# Patient Record
Sex: FEMALE | Race: BLACK OR AFRICAN AMERICAN | NOT HISPANIC OR LATINO | Employment: OTHER | URBAN - METROPOLITAN AREA
[De-identification: names, ages, dates, MRNs, and addresses within clinical notes are randomized per-mention and may not be internally consistent; named-entity substitution may affect disease eponyms.]

---

## 2017-04-26 ENCOUNTER — HOSPITAL ENCOUNTER (EMERGENCY)
Facility: HOSPITAL | Age: 67
Discharge: HOME | End: 2017-04-27
Attending: EMERGENCY MEDICINE
Payer: MEDICARE

## 2017-04-26 DIAGNOSIS — R41.82 ALTERED MENTAL STATUS, UNSPECIFIED ALTERED MENTAL STATUS TYPE: ICD-10-CM

## 2017-04-26 DIAGNOSIS — N39.0 URINARY TRACT INFECTION WITHOUT HEMATURIA, SITE UNSPECIFIED: Primary | ICD-10-CM

## 2017-04-26 LAB
ALBUMIN SERPL BCP-MCNC: 3.6 G/DL
ALP SERPL-CCNC: 36 U/L
ALT SERPL W/O P-5'-P-CCNC: 9 U/L
ANION GAP SERPL CALC-SCNC: 7 MMOL/L
AST SERPL-CCNC: 20 U/L
BACTERIA #/AREA URNS HPF: ABNORMAL /HPF
BASOPHILS # BLD AUTO: 0.01 K/UL
BASOPHILS NFR BLD: 0.3 %
BILIRUB SERPL-MCNC: 0.2 MG/DL
BILIRUB UR QL STRIP: NEGATIVE
BUN SERPL-MCNC: 19 MG/DL
CALCIUM SERPL-MCNC: 9.8 MG/DL
CHLORIDE SERPL-SCNC: 105 MMOL/L
CLARITY UR: CLEAR
CO2 SERPL-SCNC: 25 MMOL/L
COLOR UR: YELLOW
CREAT SERPL-MCNC: 1.2 MG/DL
DIFFERENTIAL METHOD: ABNORMAL
EOSINOPHIL # BLD AUTO: 0 K/UL
EOSINOPHIL NFR BLD: 0.9 %
ERYTHROCYTE [DISTWIDTH] IN BLOOD BY AUTOMATED COUNT: 14.5 %
EST. GFR  (AFRICAN AMERICAN): 55 ML/MIN/1.73 M^2
EST. GFR  (NON AFRICAN AMERICAN): 48 ML/MIN/1.73 M^2
GLUCOSE SERPL-MCNC: 112 MG/DL
GLUCOSE UR QL STRIP: NEGATIVE
HCT VFR BLD AUTO: 30.4 %
HGB BLD-MCNC: 9.7 G/DL
HGB UR QL STRIP: NEGATIVE
HYALINE CASTS #/AREA URNS LPF: 0 /LPF
KETONES UR QL STRIP: NEGATIVE
LEUKOCYTE ESTERASE UR QL STRIP: ABNORMAL
LYMPHOCYTES # BLD AUTO: 0.9 K/UL
LYMPHOCYTES NFR BLD: 26.1 %
MCH RBC QN AUTO: 26.5 PG
MCHC RBC AUTO-ENTMCNC: 31.9 %
MCV RBC AUTO: 83 FL
MICROSCOPIC COMMENT: ABNORMAL
MONOCYTES # BLD AUTO: 0.3 K/UL
MONOCYTES NFR BLD: 8.9 %
NEUTROPHILS # BLD AUTO: 2.2 K/UL
NEUTROPHILS NFR BLD: 63.8 %
NITRITE UR QL STRIP: NEGATIVE
PH UR STRIP: 6 [PH] (ref 5–8)
PLATELET # BLD AUTO: 248 K/UL
PMV BLD AUTO: 10.8 FL
POCT GLUCOSE: 132 MG/DL (ref 70–110)
POTASSIUM SERPL-SCNC: 4.1 MMOL/L
PROT SERPL-MCNC: 7.8 G/DL
PROT UR QL STRIP: ABNORMAL
RBC # BLD AUTO: 3.66 M/UL
RBC #/AREA URNS HPF: 5 /HPF (ref 0–4)
SODIUM SERPL-SCNC: 137 MMOL/L
SP GR UR STRIP: 1.02 (ref 1–1.03)
TROPONIN I SERPL DL<=0.01 NG/ML-MCNC: <0.006 NG/ML
URN SPEC COLLECT METH UR: ABNORMAL
UROBILINOGEN UR STRIP-ACNC: NEGATIVE EU/DL
WBC # BLD AUTO: 3.48 K/UL
WBC #/AREA URNS HPF: 50 /HPF (ref 0–5)

## 2017-04-26 PROCEDURE — 81000 URINALYSIS NONAUTO W/SCOPE: CPT

## 2017-04-26 PROCEDURE — 80053 COMPREHEN METABOLIC PANEL: CPT

## 2017-04-26 PROCEDURE — 85025 COMPLETE CBC W/AUTO DIFF WBC: CPT

## 2017-04-26 PROCEDURE — 84484 ASSAY OF TROPONIN QUANT: CPT

## 2017-04-26 PROCEDURE — 99284 EMERGENCY DEPT VISIT MOD MDM: CPT | Mod: 25

## 2017-04-26 PROCEDURE — 82962 GLUCOSE BLOOD TEST: CPT

## 2017-04-26 RX ORDER — CEPHALEXIN 500 MG/1
500 CAPSULE ORAL EVERY 8 HOURS
Qty: 15 CAPSULE | Refills: 0 | Status: SHIPPED | OUTPATIENT
Start: 2017-04-26 | End: 2017-05-01

## 2017-04-26 RX ORDER — TRAMADOL HYDROCHLORIDE 50 MG/1
50 TABLET ORAL EVERY 6 HOURS PRN
Status: ON HOLD | COMMUNITY
End: 2020-08-10 | Stop reason: SDUPTHER

## 2017-04-26 RX ORDER — CEPHALEXIN 250 MG/1
500 CAPSULE ORAL
Status: COMPLETED | OUTPATIENT
Start: 2017-04-27 | End: 2017-04-27

## 2017-04-26 NOTE — ED AVS SNAPSHOT
[facility]  [address] [place] [address]               Lucina Villalpando   2017  7:30 PM   ED    Description:  Female : 1950   Department:  [facility]           Your Care was Coordinated By:     Provider Role From To    Santiago Cardenas III, MD Attending Provider [phone] --      Reason for Visit     Altered Mental Status           Diagnoses this Visit        Comments    Urinary tract infection without hematuria, site unspecified    -  Primary     Altered mental status, unspecified altered mental status type           ED Disposition     ED Disposition Condition Comment    Discharge             To Do List           Follow-up Information     Follow up with Johan Harrison MD In 1 week.    Specialty:  Internal Medicine    Contact information:    [facility] [address] [phone]          Follow up with Your Neurologist.    Why:  If your symptoms continue       These Medications        Disp Refills Start End    cephALEXin (KEFLEX) 500 MG capsule 15 capsule 0 2017    Take 1 capsule (500 mg total) by mouth every 8 (eight) hours. - Oral    Pharmacy: [facility] [address] [place] [address] [place] AT [facility] Ph #: [phone]         [facility]     [facility] Care [facility] - 24 Assistance  Unless otherwise directed by your provider, please contact [facility], our nurse care line that is available for 24/ assistance.     Registered nurses in the [facility] provide: appointment scheduling, clinical advisement, health education, and other advisory services.  Call: [phone] (toll free)               Medications           Message regarding Medications     Verify the changes and/or additions to your medication regime listed below are the same as discussed with your clinician today.  If any of these changes or additions are  incorrect, please notify your healthcare provider.        START taking these NEW medications        Refills    cephALEXin (KEFLEX) 500 MG capsule 0    Sig: Take 1 capsule (500 mg total) by mouth every 8 (eight) hours.    Class: Print    Route: Oral      These medications were administered today        Dose Freq    cephALEXin capsule 500 mg 500 mg ED 1 Time    Starting on: 4/27/2017    Sig: Take 2 capsules (500 mg total) by mouth ED 1 Time.    Class: Normal    Route: Oral           Verify that the below list of medications is an accurate representation of the medications you are currently taking.  If none reported, the list may be blank. If incorrect, please contact your healthcare provider. Carry this list with you in case of emergency.           Current Medications     amitriptyline (ELAVIL) 50 MG tablet Take 50 mg by mouth every evening.    metformin (GLUCOPHAGE) 1000 MG tablet Take 1,000 mg by mouth 2 (two) times daily with meals.    naproxen (NAPROSYN) 500 MG tablet Take 500 mg by mouth 2 (two) times daily.    tramadol (ULTRAM) 50 mg tablet Take 50 mg by mouth every 6 (six) hours as needed for Pain.    cephALEXin (KEFLEX) 500 MG capsule Take 1 capsule (500 mg total) by mouth every 8 (eight) hours.    cephALEXin capsule 500 mg Starting on Apr 27, 2017. Take 2 capsules (500 mg total) by mouth ED 1 Time.    cyanocobalamin (VITAMIN B-12) 1000 MCG tablet Take 100 mcg by mouth once daily.    ondansetron (ZOFRAN-ODT) 8 MG TbDL Take 1 tablet (8 mg total) by mouth every 8 (eight) hours as needed (Nausea).    oxycodone (ROXICODONE) 5 MG immediate release tablet Take 1 tablet (5 mg total) by mouth every 6 (six) hours as needed for Pain.    potassium & sodium phosphates (PHOS-NAK) 280-160-250 mg PwPk Take 1 packet by mouth 4 (four) times daily with meals and nightly.    senna-docusate 8.6-50 mg (PERICOLACE) 8.6-50 mg per tablet Take 1 tablet by mouth 2 (two) times daily as needed for Constipation.           Clinical  "Reference Information           Your Vitals Were     BP Pulse Temp Resp Height Weight    176/83 (BP Location: Right arm, Patient Position: Sitting) 77 97.9 °F (36.6 °C) (Oral) 18 5' 9" (1.753 m) 51.7 kg (114 lb)    SpO2 BMI             96% 16.83 kg/m2         Allergies as of 4/26/2017     No Known Allergies      Immunizations Administered on Date of Encounter - 4/26/2017     None      ED Micro, Lab, POCT     Start Ordered       Status Ordering Provider    04/26/17 2030 04/26/17 2030  POCT glucose  Once      Final result     04/26/17 1937 04/26/17 1936  CBC auto differential  STAT      Final result     04/26/17 1937 04/26/17 1936  Comprehensive metabolic panel  STAT      Final result     04/26/17 1937 04/26/17 1936  Troponin I  STAT      Final result     04/26/17 1937 04/26/17 1936  Urinalysis  STAT      Final result     04/26/17 1937 04/26/17 1936  POCT glucose  Once      Acknowledged     04/26/17 1936 04/26/17 1936  Urinalysis Microscopic  Once      Final result       ED Imaging Orders     Start Ordered       Status Ordering Provider    04/26/17 1937 04/26/17 1936  X-Ray Chest 1 View  1 time imaging      Final result     04/26/17 1935 04/26/17 1934  CT Head Without Contrast  1 time imaging      Final result         Discharge Instructions       Return to the emergency department if you develop worsening headache, persistent vomiting, fever higher than 100.4°, vomiting, or for any worsening medical concerns.    [facility] Sign-Up     Activating your [facility] account is as easy as 1-2-3!     1) Visit [email or web address], select Sign Up Now, enter this activation code and your date of birth, then select Next.  [email or web address]  Expires: 6/10/2017 11:55 PM      2) Create a username and password to use when you visit [facility] in the future and select a security question in case you lose your password and select Next.    3) Enter your e-mail address and click Sign Up!    Additional Information  If you have questions, please " e-mail [email or web address] or call [phone] to talk to our [employer] staff. Remember, [facility] is NOT to be used for urgent needs. For medical emergencies, dial 911.         Smoking Cessation     If you would like to quit smoking:   You may be eligible for free services if you are a [place] resident and started smoking cigarettes before September 1, 1988.  Call the Smoking Cessation Trust ([facility]) toll free at (562) 742-2215 or ([phone] 073-8122.   Call 1-800-QUIT-NOW if you do not meet the above criteria.   Contact us via email: [email or web address]   View our website for more information: [email or web address]         [facility] complies with applicable Federal civil rights laws and does not discriminate on the basis of race, color, national origin, age, disability, or sex.        Language Assistance Services     ATTENTION: Language assistance services are available, free of charge. Please call [phone].      ATENCIÓN: Si habla español, tiene a sun disposición servicios gratuitos de asistencia lingüística. Llame al [phone].     CHÚ Ý: N?u b?n nói Ti?ng Vi?t, có các d?ch v? h? tr? ngôn ng? mi?n phí dành cho b?n. G?i s? [phone].

## 2017-04-27 VITALS
HEIGHT: 69 IN | RESPIRATION RATE: 18 BRPM | DIASTOLIC BLOOD PRESSURE: 74 MMHG | WEIGHT: 114 LBS | SYSTOLIC BLOOD PRESSURE: 138 MMHG | OXYGEN SATURATION: 100 % | HEART RATE: 68 BPM | TEMPERATURE: 98 F | BODY MASS INDEX: 16.88 KG/M2

## 2017-04-27 PROCEDURE — 25000003 PHARM REV CODE 250: Performed by: EMERGENCY MEDICINE

## 2017-04-27 PROCEDURE — 93005 ELECTROCARDIOGRAM TRACING: CPT

## 2017-04-27 RX ADMIN — CEPHALEXIN 500 MG: 250 CAPSULE ORAL at 12:04

## 2017-04-27 NOTE — ED PROVIDER NOTES
Encounter Date: 4/26/2017    SCRIBE #1 NOTE: I, Nitin Lopez, am scribing for, and in the presence of,  Santiago Cardenas MD. I have scribed the following portions of the note - Other sections scribed: ROS, HPI.       History     Chief Complaint   Patient presents with    Altered Mental Status     family reports confusion since yesterday and headache not relieved with her normal meds, fall two days ago and hit her head on the wall     Review of patient's allergies indicates:  No Known Allergies  HPI Comments: CC: AMS    HPI: This 66 y.o. F, who has a past medical history of Arthritis; Depression; Diabetes mellitus; Fibromyalgia; Hypertension; and Stroke, presents to the ED for evaluation of confusion, frontal headache and left knee soreness since falling forward off the toilet 2 nights ago and hitting her head on a wall. Episodes of confusion started this afternoon.  Patient's caregiver reports that she does seem to be staring more than normal.  Patient describes her confusion has been unable to complete thoughts.  She also describes that earlier today she was attempting to pay a bill over the phone but could not finish doing so.  No prior episodes of similar confusion. She denies dizziness, facial asymmetry, speech difficulty, numbness, nausea, vomiting, chest pain, shortness of breath, weakness, fatigue, back pain, neck pain, dysuria and fever. She notes she has chronic diarrhea; approximately 3 episodes a day.     The history is provided by the patient and a caregiver.     Past Medical History:   Diagnosis Date    Arthritis     Depression     Diabetes mellitus     Fibromyalgia     Hypertension     Stroke      Past Surgical History:   Procedure Laterality Date    BACK SURGERY      2 x for ruptured disc    KNEE SURGERY      right knee-meniscus     Family History   Problem Relation Age of Onset    Diabetes Mother     Diabetes Sister      Social History   Substance Use Topics    Smoking status: Current  Every Day Smoker     Packs/day: 1.00     Years: 46.00     Types: Cigarettes    Smokeless tobacco: Never Used    Alcohol use Yes      Comment: socially     Review of Systems   Constitutional: Negative for fever.   HENT: Negative for sore throat.    Eyes: Negative for visual disturbance.   Respiratory: Negative for shortness of breath.    Cardiovascular: Negative for chest pain.   Gastrointestinal: Positive for diarrhea (chronic). Negative for abdominal pain, nausea and vomiting.   Genitourinary: Negative for dysuria.   Musculoskeletal: Negative for back pain.        (+) Left knee soreness   Skin: Negative for rash.   Neurological: Positive for headaches (frontal). Negative for facial asymmetry, weakness and numbness.   Psychiatric/Behavioral: Positive for confusion.       Physical Exam   Initial Vitals   BP Pulse Resp Temp SpO2   04/26/17 1855 04/26/17 1855 04/26/17 1855 04/26/17 1855 04/26/17 1855   176/83 77 18 97.9 °F (36.6 °C) 96 %     Physical Exam    Nursing note and vitals reviewed.  Constitutional: She appears well-developed and well-nourished. She is not diaphoretic. No distress.   HENT:   Head: Normocephalic and atraumatic.   Nose: Nose normal.   Mouth/Throat: Oropharynx is clear and moist. No oropharyngeal exudate.   Eyes: Conjunctivae and EOM are normal. Pupils are equal, round, and reactive to light. No scleral icterus.   Neck: Normal range of motion. Neck supple. No thyromegaly present. No tracheal deviation present.   Cardiovascular: Normal rate, regular rhythm and normal heart sounds. Exam reveals no gallop and no friction rub.    No murmur heard.  Pulmonary/Chest: Breath sounds normal. No respiratory distress. She has no wheezes. She has no rhonchi. She has no rales.   Abdominal: Soft. Bowel sounds are normal. She exhibits no distension and no mass. There is no tenderness. There is no rebound and no guarding.   Musculoskeletal: Normal range of motion. She exhibits no edema or tenderness.    Lymphadenopathy:     She has no cervical adenopathy.   Neurological: She is alert and oriented to person, place, and time. She has normal strength. No cranial nerve deficit or sensory deficit.   Skin: Skin is warm and dry. No rash noted. No erythema. No pallor.   Psychiatric: She has a normal mood and affect. Her behavior is normal. Thought content normal.         ED Course   Procedures  Labs Reviewed   CBC W/ AUTO DIFFERENTIAL - Abnormal; Notable for the following:        Result Value    WBC 3.48 (*)     RBC 3.66 (*)     Hemoglobin 9.7 (*)     Hematocrit 30.4 (*)     MCH 26.5 (*)     MCHC 31.9 (*)     Lymph # 0.9 (*)     All other components within normal limits   POCT GLUCOSE - Abnormal; Notable for the following:     POCT Glucose 132 (*)     All other components within normal limits   COMPREHENSIVE METABOLIC PANEL   TROPONIN I   URINALYSIS   POCT GLUCOSE MONITORING CONTINUOUS             Medical Decision Making:   Initial Assessment:   66-year-old female presents complaining of confusion in the setting of a fall 2 days ago and headache.  She does report that in the past she had severe migraines, treated with amitriptyline, but that those headaches seem to have completely resolved until recently.  On exam she is well-appearing, moderately hypertensive, with a normal neurologic exam, normal mental status, answering all questions appropriately.  Differential Diagnosis:   Will screen for intracranial hemorrhage.  Will also screen for causes of altered mental status, including uremia, electrolyte abnormalities, glucose abnormalities, infection, dehydration.  Independently Interpreted Test(s):   I have ordered and independently interpreted X-rays - see summary below.       <> Summary of X-Ray Reading(s): CT head: No acute abnormalities, old infarct noted  I have ordered and independently interpreted EKG Reading(s) - see summary below       <> Summary of EKG Reading(s):  NSR, nl axis, nl intervals, no definite acute  ischemic changes  ED Management:  Patient's workup reveals urinary tract infection and no other significant abnormality.  Her exam is stable, still with a completely normal neurologic exam.  It is likely that her urinary tract infection as the cause of her subjective sense of confusion, which seems more appropriately characterized as concentration difficulty.  No evidence of sepsis or pyelonephritis.  We'll treat with Keflex and recommend follow-up with primary physician.            Scribe Attestation:   Scribe #1: I performed the above scribed service and the documentation accurately describes the services I performed. I attest to the accuracy of the note.    Attending Attestation:           Physician Attestation for Scribe:  Physician Attestation Statement for Scribe #1: I, Santiago Cardenas MD, reviewed documentation, as scribed by Nitin Lopez in my presence, and it is both accurate and complete.                 ED Course     Clinical Impression:   The primary encounter diagnosis was Urinary tract infection without hematuria, site unspecified. A diagnosis of Altered mental status, unspecified altered mental status type was also pertinent to this visit.          Santiago Cardenas III, MD  04/27/17 0209

## 2017-04-27 NOTE — ED TRIAGE NOTES
Pt's daughter reports pt fell a couple days ago and might have hit her head. Pt states that she has had a headache and has taken 2 tramadol. Pt's daughter states that the pt has been confused and unable to hold a conversation for the past day.

## 2017-04-27 NOTE — DISCHARGE INSTRUCTIONS
Return to the emergency department if you develop worsening headache, persistent vomiting, fever higher than 100.4°, vomiting, or for any worsening medical concerns.

## 2017-05-08 ENCOUNTER — HOSPITAL ENCOUNTER (EMERGENCY)
Facility: HOSPITAL | Age: 67
Discharge: HOME | End: 2017-05-08
Attending: EMERGENCY MEDICINE
Payer: MEDICARE

## 2017-05-08 VITALS
WEIGHT: 114 LBS | SYSTOLIC BLOOD PRESSURE: 154 MMHG | HEART RATE: 66 BPM | DIASTOLIC BLOOD PRESSURE: 75 MMHG | RESPIRATION RATE: 16 BRPM | OXYGEN SATURATION: 100 % | BODY MASS INDEX: 16.88 KG/M2 | TEMPERATURE: 98 F | HEIGHT: 69 IN

## 2017-05-08 DIAGNOSIS — N39.0 URINARY TRACT INFECTION WITHOUT HEMATURIA, SITE UNSPECIFIED: Primary | ICD-10-CM
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
## 2017-05-08 LAB
ALBUMIN SERPL BCP-MCNC: 3.4 G/DL
ALP SERPL-CCNC: 38 U/L
ALT SERPL W/O P-5'-P-CCNC: 7 U/L
ANION GAP SERPL CALC-SCNC: 8 MMOL/L
AST SERPL-CCNC: 15 U/L
BACTERIA #/AREA URNS HPF: ABNORMAL /HPF
BASOPHILS # BLD AUTO: 0.01 K/UL
BASOPHILS NFR BLD: 0.2 %
BILIRUB SERPL-MCNC: 0.3 MG/DL
BILIRUB UR QL STRIP: NEGATIVE
BUN SERPL-MCNC: 23 MG/DL
CALCIUM SERPL-MCNC: 9.5 MG/DL
CHLORIDE SERPL-SCNC: 107 MMOL/L
CLARITY UR: ABNORMAL
CO2 SERPL-SCNC: 26 MMOL/L
COLOR UR: YELLOW
CREAT SERPL-MCNC: 0.9 MG/DL
DIFFERENTIAL METHOD: ABNORMAL
EOSINOPHIL # BLD AUTO: 0 K/UL
EOSINOPHIL NFR BLD: 0.2 %
ERYTHROCYTE [DISTWIDTH] IN BLOOD BY AUTOMATED COUNT: 14.3 %
EST. GFR  (AFRICAN AMERICAN): >60 ML/MIN/1.73 M^2
EST. GFR  (NON AFRICAN AMERICAN): >60 ML/MIN/1.73 M^2
GLUCOSE SERPL-MCNC: 96 MG/DL
GLUCOSE UR QL STRIP: NEGATIVE
HCT VFR BLD AUTO: 29.5 %
HGB BLD-MCNC: 9.7 G/DL
HGB UR QL STRIP: ABNORMAL
HYALINE CASTS #/AREA URNS LPF: 0 /LPF
KETONES UR QL STRIP: NEGATIVE
LEUKOCYTE ESTERASE UR QL STRIP: ABNORMAL
LYMPHOCYTES # BLD AUTO: 0.8 K/UL
LYMPHOCYTES NFR BLD: 17 %
MCH RBC QN AUTO: 27.4 PG
MCHC RBC AUTO-ENTMCNC: 32.9 %
MCV RBC AUTO: 83 FL
MICROSCOPIC COMMENT: ABNORMAL
MONOCYTES # BLD AUTO: 0.2 K/UL
MONOCYTES NFR BLD: 5.1 %
NEUTROPHILS # BLD AUTO: 3.6 K/UL
NEUTROPHILS NFR BLD: 77.5 %
NITRITE UR QL STRIP: POSITIVE
PH UR STRIP: 6 [PH] (ref 5–8)
PLATELET # BLD AUTO: 239 K/UL
PMV BLD AUTO: 10.8 FL
POTASSIUM SERPL-SCNC: 3.7 MMOL/L
PROT SERPL-MCNC: 7.7 G/DL
PROT UR QL STRIP: ABNORMAL
RBC # BLD AUTO: 3.54 M/UL
RBC #/AREA URNS HPF: 0 /HPF (ref 0–4)
SODIUM SERPL-SCNC: 141 MMOL/L
SP GR UR STRIP: 1.01 (ref 1–1.03)
TROPONIN I SERPL DL<=0.01 NG/ML-MCNC: <0.006 NG/ML
URN SPEC COLLECT METH UR: ABNORMAL
UROBILINOGEN UR STRIP-ACNC: NEGATIVE EU/DL
WBC # BLD AUTO: 4.7 K/UL
WBC #/AREA URNS HPF: >100 /HPF (ref 0–5)
WBC CLUMPS URNS QL MICRO: ABNORMAL

## 2017-05-08 PROCEDURE — 81000 URINALYSIS NONAUTO W/SCOPE: CPT

## 2017-05-08 PROCEDURE — 96365 THER/PROPH/DIAG IV INF INIT: CPT

## 2017-05-08 PROCEDURE — 84484 ASSAY OF TROPONIN QUANT: CPT

## 2017-05-08 PROCEDURE — 85025 COMPLETE CBC W/AUTO DIFF WBC: CPT

## 2017-05-08 PROCEDURE — 93005 ELECTROCARDIOGRAM TRACING: CPT

## 2017-05-08 PROCEDURE — P9612 CATHETERIZE FOR URINE SPEC: HCPCS

## 2017-05-08 PROCEDURE — 63600175 PHARM REV CODE 636 W HCPCS: Performed by: EMERGENCY MEDICINE

## 2017-05-08 PROCEDURE — 25000003 PHARM REV CODE 250: Performed by: EMERGENCY MEDICINE

## 2017-05-08 PROCEDURE — 80053 COMPREHEN METABOLIC PANEL: CPT

## 2017-05-08 PROCEDURE — 99284 EMERGENCY DEPT VISIT MOD MDM: CPT | Mod: 25

## 2017-05-08 RX ORDER — ACETAMINOPHEN 325 MG/1
650 TABLET ORAL
Status: COMPLETED | OUTPATIENT
Start: 2017-05-08 | End: 2017-05-08

## 2017-05-08 RX ORDER — CIPROFLOXACIN 500 MG/1
500 TABLET ORAL 2 TIMES DAILY
Qty: 20 TABLET | Refills: 0 | Status: SHIPPED | OUTPATIENT
Start: 2017-05-08 | End: 2017-05-18

## 2017-05-08 RX ORDER — CIPROFLOXACIN 2 MG/ML
400 INJECTION, SOLUTION INTRAVENOUS
Status: COMPLETED | OUTPATIENT
Start: 2017-05-08 | End: 2017-05-08

## 2017-05-08 RX ADMIN — CIPROFLOXACIN 400 MG: 2 INJECTION, SOLUTION INTRAVENOUS at 01:05

## 2017-05-08 RX ADMIN — ACETAMINOPHEN 650 MG: 325 TABLET, FILM COATED ORAL at 11:05

## 2017-05-08 NOTE — ED AVS SNAPSHOT
[facility]  [address] [place] [address]               Lucina Villalpando   2017  9:07 AM   ED    Description:  Female : 1950   Department:  [facility]           Your Care was Coordinated By:     Provider Role From To    Ericka Garcia MD Attending Provider 17 0908 --      Reason for Visit     Fall           Diagnoses this Visit        Comments    Urinary tract infection without hematuria, site unspecified    -  Primary     Fall           ED Disposition     ED Disposition Condition Comment    Discharge             To Do List           Follow-up Information     Follow up with Johan Harrison MD.    Specialty:  Internal Medicine    Contact information:    [address] [facility] [id number] [phone]          Schedule an appointment as soon as possible for a visit to follow up.       These Medications        Disp Refills Start End    ciprofloxacin HCl (CIPRO) 500 MG tablet 20 tablet 0 2017    Take 1 tablet (500 mg total) by mouth 2 (two) times daily. - Oral    Pharmacy: [facility] [address] [place] [address] [place] [address] [place] AT [facility] Ph #: [phone]         [facility]     [facility] Care [facility] -  Assistance  Unless otherwise directed by your provider, please contact [facility], our nurse care line that is available for  assistance.     Registered nurses in the [facility] provide: appointment scheduling, clinical advisement, health education, and other advisory services.  Call: [phone] (toll free)               Medications           Message regarding Medications     Verify the changes and/or additions to your medication regime listed below are the same as discussed with your clinician today.  If any of these changes or additions are incorrect, please notify your healthcare provider.        START taking these NEW  medications        Refills    ciprofloxacin HCl (CIPRO) 500 MG tablet 0    Sig: Take 1 tablet (500 mg total) by mouth 2 (two) times daily.    Class: Normal    Route: Oral      These medications were administered today        Dose Freq    acetaminophen tablet 650 mg 650 mg ED 1 Time    Sig: Take 2 tablets (650 mg total) by mouth ED 1 Time.    Class: Normal    Route: Oral    ciprofloxacin (CIPRO)400mg/200ml D5W IVPB 400 mg 400 mg ED 1 Time    Sig: Inject 200 mLs (400 mg total) into the vein ED 1 Time.    Class: Normal    Route: Intravenous           Verify that the below list of medications is an accurate representation of the medications you are currently taking.  If none reported, the list may be blank. If incorrect, please contact your healthcare provider. Carry this list with you in case of emergency.           Current Medications     amitriptyline (ELAVIL) 50 MG tablet Take 50 mg by mouth every evening.    cyanocobalamin (VITAMIN B-12) 1000 MCG tablet Take 100 mcg by mouth once daily.    metformin (GLUCOPHAGE) 1000 MG tablet Take 1,000 mg by mouth 2 (two) times daily with meals.    naproxen (NAPROSYN) 500 MG tablet Take 500 mg by mouth 2 (two) times daily.    ondansetron (ZOFRAN-ODT) 8 MG TbDL Take 1 tablet (8 mg total) by mouth every 8 (eight) hours as needed (Nausea).    oxycodone (ROXICODONE) 5 MG immediate release tablet Take 1 tablet (5 mg total) by mouth every 6 (six) hours as needed for Pain.    potassium & sodium phosphates (PHOS-NAK) 280-160-250 mg PwPk Take 1 packet by mouth 4 (four) times daily with meals and nightly.    senna-docusate 8.6-50 mg (PERICOLACE) 8.6-50 mg per tablet Take 1 tablet by mouth 2 (two) times daily as needed for Constipation.    tramadol (ULTRAM) 50 mg tablet Take 50 mg by mouth every 6 (six) hours as needed for Pain.    ciprofloxacin HCl (CIPRO) 500 MG tablet Take 1 tablet (500 mg total) by mouth 2 (two) times daily.           Clinical Reference Information           Your Vitals  "Were     BP Pulse Temp Resp Height Weight    154/75 66 98.3 °F (36.8 °C) (Oral) 18 5' 9" (1.753 m) 51.7 kg (114 lb)    SpO2 BMI             100% 16.83 kg/m2         Allergies as of 5/8/2017     No Known Allergies      Immunizations Administered on Date of Encounter - 5/8/2017     None      ED Micro, Lab, POCT     Start Ordered       Status Ordering Provider    05/08/17 1405 05/08/17 1404  Urine culture **CANNOT BE ORDERED STAT**  Once      Acknowledged     05/08/17 1030 05/08/17 1029  Urinalysis  STAT      Final result     05/08/17 1029 05/08/17 1029  Urinalysis Microscopic  Once      Final result     05/08/17 0913 05/08/17 0913  CBC auto differential  STAT      Final result     05/08/17 0913 05/08/17 0913  Comprehensive metabolic panel  STAT      Final result     05/08/17 0913 05/08/17 0913  Troponin I  STAT      Final result       ED Imaging Orders     Start Ordered       Status Ordering Provider    05/08/17 1030 05/08/17 1029  X-Ray Hip 2 View Right  1 time imaging      Final result     05/08/17 1029 05/08/17 1029  X-Ray Knee 3 View Left  1 time imaging      Final result     05/08/17 0912 05/08/17 0912  CT Head Without Contrast  1 time imaging      Final result     05/08/17 0912 05/08/17 0912  CT Maxillofacial Without Contrast  1 time imaging      Final result         Discharge Instructions         Bladder Infection, Female (Adult)    Urine is normally doesn't have any bacteria in it. But bacteria can get into the urinary tract from the skin around the rectum. Or they can travel in the blood from elsewhere in the body. Once they are in your urinary tract, they can cause infection in the urethra (urethritis), the bladder (cystitis), or the kidneys (pyelonephritis).  The most common place for an infection is in the bladder. This is called a bladder infection. This is one of the most common infections in women. Most bladder infections are easily treated. They are not serious unless the infection spreads to the " "kidney.  The phrases "bladder infection," "UTI," and "cystitis" are often used to describe the same thing. But they are not always the same. Cystitis is an inflammation of the bladder. The most common cause of cystitis is an infection.  Symptoms  The infection causes inflammation in the urethra and bladder. This causes many of the symptoms. The most common symptoms of a bladder infection are:  · Pain or burning when urinating  · Having to urinate more often than usual  · Urgent need to urinate  · Only a small amount of urine comes out  · Blood in urine  · Abdominal discomfort. This is usually in the lower abdomen above the pubic bone.  · Cloudy urine  · Strong- or bad-smelling urine  · Unable to urinate (urinary retention)  · Unable to hold urine in (urinary incontinence)  · Fever  · Loss of appetite  · Confusion (in older adults)  Causes  Bladder infections are not contagious. You can't get one from someone else, from a toilet seat, or from sharing a bath.  The most common cause of bladder infections is bacteria from the bowels. The bacteria get onto the skin around the opening of the urethra. From there, they can get into the urine and travel up to the bladder, causing inflammation and infection. This usually happens because of:  · Wiping improperly after urinating. Always wipe from front to back.  · Bowel incontinence  · Pregnancy  · Procedures such as having a catheter inserted  · Older age  · Not emptying your bladder. This can allow bacteria a chance to grow in your urine.  · Dehydration  · Constipation  · Sex  · Use of a diaphragm for birth control   Treatment  Bladder infections are diagnosed by a urine test. They are treated with antibiotics and usually clear up quickly without complications. Treatment helps prevent a more serious kidney infection.  Medicines  Medicines can help in the treatment of a bladder infection:  · Take antibiotics until they are used up, even if you feel better. It is important to " finish them to make sure the infection has cleared.  · You can use acetaminophen or ibuprofen for pain, fever, or discomfort, unless another medicine was prescribed. If you have chronic liver or kidney disease, talk with your healthcare provider before using these medicines. Also talk with your provider if you've ever had a stomach ulcer or gastrointestinal bleeding, or are taking blood-thinner medicines.  · If you are given phenazopydridine to reduce burning with urination, it will cause your urine to become a bright orange color. This can stain clothing.  Care and prevention  These self-care steps can help prevent future infections:  · Drink plenty of fluids to prevent dehydration and flush out your bladder. Do this unless you must restrict fluids for other health reasons, or your doctor told you not to.  · Proper cleaning after going to the bathroom is important. Wipe from front to back after using the toilet to prevent the spread of bacteria.  · Urinate more often. Don't try to hold urine in for a long time.  · Wear loose-fitting clothes and cotton underwear. Avoid tight-fitting pants.  · Improve your diet and prevent constipation. Eat more fresh fruit and vegetables, and fiber, and less junk and fatty foods.  · Avoid sex until your symptoms are gone.  · Avoid caffeine, alcohol, and spicy foods. These can irritate your bladder.  · Urinate right after intercourse to flush out your bladder.  · If you use birth control pills and have frequent bladder infections, discuss it with your doctor.  Follow-up care  Call your healthcare provider if all symptoms are not gone after 3 days of treatment. This is especially important if you have repeat infections.  If a culture was done, you will be told if your treatment needs to be changed. If directed, you can call to find out the results.  If X-rays were done, you will be told if the results will affect your treatment.  Call 911  Call 911 if any of the following  occur:  · Trouble breathing  · Hard to wake up or confusion  · Fainting or loss of consciousness  · Rapid heart rate  When to seek medical advice  Call your healthcare provider right away if any of these occur:  · Fever of 100.4ºF (38.0ºC) or higher, or as directed by your healthcare provider  · Symptoms are not better by the third day of treatment  · Back or belly (abdominal) pain that gets worse  · Repeated vomiting, or unable to keep medicine down  · Weakness or dizziness  · Vaginal discharge  · Pain, redness, or swelling in the outer vaginal area (labia)  Date Last Reviewed: 10/1/2016  © 7247-5886 [employer]. [address] [place] [address], [place] [address]. All rights reserved. This information is not intended as a substitute for professional medical care. Always follow your healthcare professional's instructions.          Fall Due to Dizziness, Weakness, or Loss of Balance  The symptoms that led to your fall have been evaluated. Your doctor feels it is safe for you to return home.  Many things can cause you to become dizzy. Everyone means a little something different by the word dizzy. People may describe their symptoms using these words:  · It doesnt feel right in my head  · It feels like spinning in my head  · It seems like the room is spinning  · My balance feels off  · I feel lightheaded, like I am going to pass out  All these descriptions can have real causes.     Your balance mechanism is in your inner ear. Anything disturbing it can make you feel dizzy, whether it is from a cold, an injury, or many other things. Anything that causes your blood pressure to drop suddenly can make you feel lightheaded, or like you are going to faint. This is because at that moment there might not be enough blood flowing to your brain. Causes include:  · Medicines  · Dehydration  · Standing up or bending over too quickly  · Becoming overheated  · Taking a hot shower or bath  · Straining hard while lifting something  or using the toilet  · Strokes, heart attack, heart valve disease, very slow or very fast heart rate  · Low blood sugar  · Ear infection  · Hyperventilation  · Anemia  · Trauma  · Infection  · Panic attack  · Pregnancy  You may be at risk of repeat falls. Take precautions described below to prevent another fall.  Home care  · If you become lightheaded or dizzy, lie down immediately or sit and lean forward with your head down. It is better to do this than fall and seriously hurt or injure yourself.  · Rest today. When changing position, take a moment to be sure any dizziness goes away before standing and walking.  · If you have been prescribed a walker, be sure to use it whenever you walk, even if it is a short distance.  · If you were injured during the fall, follow the advice from your doctor regarding care of your injury.  · Be sure your doctor knows all the medicines, herbs, and supplements you take. Some medicines can cause dizziness.  Follow-up care  Unless given other advice, call your doctor on the next office day to advise of your fall and to schedule an appointment. You may require further treatment for the underlying condition that caused todays fall.  If X-ray or a CT scan were done, you will be notified of the results, especially if it affects treatment.  Call 911  Call 911 if any of these occur:  · Trouble breathing  · Confused or difficulty arousing  · Fainting or loss of consciousness  · Rapid or very slow heart rate  · Seizure  · Difficulty with speech or vision, weakness of an arm or leg  · Difficulty walking or talking, loss of balance  · Numbness or weakness in one side of your body, facial droop  When to seek medical advice  Call your healthcare provider right away if any of the following occur:  · Another fall  · Continued dizzy spells  · Severe headache  · Blood in vomit or stools (black or red color)  Date Last Reviewed: 11/5/2015  © 4333-6163 [employer]. [address] [place] [address]  Road, [place] [address]. All rights reserved. This information is not intended as a substitute for professional medical care. Always follow your healthcare professional's instructions.          [facility] Sign-Up     Activating your [facility] account is as easy as 1-2-3!     1) Visit [email or web address], select Sign Up Now, enter this activation code and your date of birth, then select Next.  [email or web address]  Expires: 6/10/2017 11:55 PM      2) Create a username and password to use when you visit [facility] in the future and select a security question in case you lose your password and select Next.    3) Enter your e-mail address and click Sign Up!    Additional Information  If you have questions, please e-mail [email or web address] or call [phone] to talk to our [facility] staff. Remember, [facility] is NOT to be used for urgent needs. For medical emergencies, dial 911.         Smoking Cessation     If you would like to quit smoking:   You may be eligible for free services if you are a [place] resident and started smoking cigarettes before September 1, 1988.  Call the Smoking Cessation Trust ([facility]) toll free at (312) 220-2005 or (637) 153-1330.   Call 1-800-QUIT-NOW if you do not meet the above criteria.   Contact us via email: [email or web address]   View our website for more information: [email or web address]         [facility] complies with applicable Federal civil rights laws and does not discriminate on the basis of race, color, national origin, age, disability, or sex.        Language Assistance Services     ATTENTION: Language assistance services are available, free of charge. Please call [phone].      ATENCIÓN: Si habla sandor, tiene a sun disposición servicios gratuitos de asistencia lingüística. Llame al [phone].     CHÚ Ý: N?u b?n nói Ti?ng Vi?t, có các d?ch v? h? tr? ngôn ng? mi?n phí dành cho b?n. G?i s? [phone].

## 2017-05-08 NOTE — ED TRIAGE NOTES
Pt arrived to ED this morning from home with c/o fall this AM in her driveway.  Pt usually uses a walker to ambulate but states she was not using her walker at the time of the fall.  Pt has a small abrasion to her left eye brow, and her left calf.  Pt states she is compliant with her home medications and took her tramadol last night.  Pt VS are stable and she is AAO x4 with no other complaints.

## 2017-05-08 NOTE — ED NOTES
Hourly rounding performed.  Family at bedside. Pt resting comfortably in bed, side rails up, call light within reach. Cardiac, BP, and pulse ox monitoring in place, VS stable.  Discharge instructions discussed

## 2017-05-08 NOTE — DISCHARGE INSTRUCTIONS
"  Bladder Infection, Female (Adult)    Urine is normally doesn't have any bacteria in it. But bacteria can get into the urinary tract from the skin around the rectum. Or they can travel in the blood from elsewhere in the body. Once they are in your urinary tract, they can cause infection in the urethra (urethritis), the bladder (cystitis), or the kidneys (pyelonephritis).  The most common place for an infection is in the bladder. This is called a bladder infection. This is one of the most common infections in women. Most bladder infections are easily treated. They are not serious unless the infection spreads to the kidney.  The phrases "bladder infection," "UTI," and "cystitis" are often used to describe the same thing. But they are not always the same. Cystitis is an inflammation of the bladder. The most common cause of cystitis is an infection.  Symptoms  The infection causes inflammation in the urethra and bladder. This causes many of the symptoms. The most common symptoms of a bladder infection are:  · Pain or burning when urinating  · Having to urinate more often than usual  · Urgent need to urinate  · Only a small amount of urine comes out  · Blood in urine  · Abdominal discomfort. This is usually in the lower abdomen above the pubic bone.  · Cloudy urine  · Strong- or bad-smelling urine  · Unable to urinate (urinary retention)  · Unable to hold urine in (urinary incontinence)  · Fever  · Loss of appetite  · Confusion (in older adults)  Causes  Bladder infections are not contagious. You can't get one from someone else, from a toilet seat, or from sharing a bath.  The most common cause of bladder infections is bacteria from the bowels. The bacteria get onto the skin around the opening of the urethra. From there, they can get into the urine and travel up to the bladder, causing inflammation and infection. This usually happens because of:  · Wiping improperly after urinating. Always wipe from front to " back.  · Bowel incontinence  · Pregnancy  · Procedures such as having a catheter inserted  · Older age  · Not emptying your bladder. This can allow bacteria a chance to grow in your urine.  · Dehydration  · Constipation  · Sex  · Use of a diaphragm for birth control   Treatment  Bladder infections are diagnosed by a urine test. They are treated with antibiotics and usually clear up quickly without complications. Treatment helps prevent a more serious kidney infection.  Medicines  Medicines can help in the treatment of a bladder infection:  · Take antibiotics until they are used up, even if you feel better. It is important to finish them to make sure the infection has cleared.  · You can use acetaminophen or ibuprofen for pain, fever, or discomfort, unless another medicine was prescribed. If you have chronic liver or kidney disease, talk with your healthcare provider before using these medicines. Also talk with your provider if you've ever had a stomach ulcer or gastrointestinal bleeding, or are taking blood-thinner medicines.  · If you are given phenazopydridine to reduce burning with urination, it will cause your urine to become a bright orange color. This can stain clothing.  Care and prevention  These self-care steps can help prevent future infections:  · Drink plenty of fluids to prevent dehydration and flush out your bladder. Do this unless you must restrict fluids for other health reasons, or your doctor told you not to.  · Proper cleaning after going to the bathroom is important. Wipe from front to back after using the toilet to prevent the spread of bacteria.  · Urinate more often. Don't try to hold urine in for a long time.  · Wear loose-fitting clothes and cotton underwear. Avoid tight-fitting pants.  · Improve your diet and prevent constipation. Eat more fresh fruit and vegetables, and fiber, and less junk and fatty foods.  · Avoid sex until your symptoms are gone.  · Avoid caffeine, alcohol, and spicy  foods. These can irritate your bladder.  · Urinate right after intercourse to flush out your bladder.  · If you use birth control pills and have frequent bladder infections, discuss it with your doctor.  Follow-up care  Call your healthcare provider if all symptoms are not gone after 3 days of treatment. This is especially important if you have repeat infections.  If a culture was done, you will be told if your treatment needs to be changed. If directed, you can call to find out the results.  If X-rays were done, you will be told if the results will affect your treatment.  Call 911  Call 911 if any of the following occur:  · Trouble breathing  · Hard to wake up or confusion  · Fainting or loss of consciousness  · Rapid heart rate  When to seek medical advice  Call your healthcare provider right away if any of these occur:  · Fever of 100.4ºF (38.0ºC) or higher, or as directed by your healthcare provider  · Symptoms are not better by the third day of treatment  · Back or belly (abdominal) pain that gets worse  · Repeated vomiting, or unable to keep medicine down  · Weakness or dizziness  · Vaginal discharge  · Pain, redness, or swelling in the outer vaginal area (labia)  Date Last Reviewed: 10/1/2016  © 8399-4098 [employer]. [address] [place] [address], [place] [id number]. All rights reserved. This information is not intended as a substitute for professional medical care. Always follow your healthcare professional's instructions.          Fall Due to Dizziness, Weakness, or Loss of Balance  The symptoms that led to your fall have been evaluated. Your doctor feels it is safe for you to return home.  Many things can cause you to become dizzy. Everyone means a little something different by the word dizzy. People may describe their symptoms using these words:  · It doesnt feel right in my head  · It feels like spinning in my head  · It seems like the room is spinning  · My balance feels off  · I feel  lightheaded, like I am going to pass out  All these descriptions can have real causes.     Your balance mechanism is in your inner ear. Anything disturbing it can make you feel dizzy, whether it is from a cold, an injury, or many other things. Anything that causes your blood pressure to drop suddenly can make you feel lightheaded, or like you are going to faint. This is because at that moment there might not be enough blood flowing to your brain. Causes include:  · Medicines  · Dehydration  · Standing up or bending over too quickly  · Becoming overheated  · Taking a hot shower or bath  · Straining hard while lifting something or using the toilet  · Strokes, heart attack, heart valve disease, very slow or very fast heart rate  · Low blood sugar  · Ear infection  · Hyperventilation  · Anemia  · Trauma  · Infection  · Panic attack  · Pregnancy  You may be at risk of repeat falls. Take precautions described below to prevent another fall.  Home care  · If you become lightheaded or dizzy, lie down immediately or sit and lean forward with your head down. It is better to do this than fall and seriously hurt or injure yourself.  · Rest today. When changing position, take a moment to be sure any dizziness goes away before standing and walking.  · If you have been prescribed a walker, be sure to use it whenever you walk, even if it is a short distance.  · If you were injured during the fall, follow the advice from your doctor regarding care of your injury.  · Be sure your doctor knows all the medicines, herbs, and supplements you take. Some medicines can cause dizziness.  Follow-up care  Unless given other advice, call your doctor on the next office day to advise of your fall and to schedule an appointment. You may require further treatment for the underlying condition that caused todays fall.  If X-ray or a CT scan were done, you will be notified of the results, especially if it affects treatment.  Call 911  Call 911 if any  of these occur:  · Trouble breathing  · Confused or difficulty arousing  · Fainting or loss of consciousness  · Rapid or very slow heart rate  · Seizure  · Difficulty with speech or vision, weakness of an arm or leg  · Difficulty walking or talking, loss of balance  · Numbness or weakness in one side of your body, facial droop  When to seek medical advice  Call your healthcare provider right away if any of the following occur:  · Another fall  · Continued dizzy spells  · Severe headache  · Blood in vomit or stools (black or red color)  Date Last Reviewed: 11/5/2015  © 3469-6465 [employer]. [address] [place] [address], [place] [address]. All rights reserved. This information is not intended as a substitute for professional medical care. Always follow your healthcare professional's instructions.

## 2017-05-08 NOTE — ED NOTES
Hourly rounding performed.  Family at bedside. Pt resting comfortably in bed, side rails up, call light within reach. Cardiac, BP, and pulse ox monitoring in place, VS stable.  Will continue to monitor.

## 2017-05-08 NOTE — ED PROVIDER NOTES
"Encounter Date: 5/8/2017    SCRIBE #1 NOTE: I, Nitin Lopez, am scribing for, and in the presence of,  Ericka Garcia MD. I have scribed the following portions of the note - Other sections scribed: ROS, HPI.       History     Chief Complaint   Patient presents with    Fall     pt states " I was putting the garbage out this morning and fell. I'm not sure what happened" abrasion noted to left eye brow     Review of patient's allergies indicates:  No Known Allergies  HPI Comments: CC: Fall    HPI: This 66 y.o. F, who has a past medical history of Arthritis; Depression; Diabetes mellitus; Fibromyalgia; Hypertension; and Stroke, presents to the ED for evaluation secondary to a mechanical fall this morning. The patient bent over to pickup the paper off the lawn when she fell forward hitting her head on the ground. She denies losing consciousness. She notes she usually ambulates with walker but did not use it this morning. She is c/o a headache, a small laceration above the left eye, right hip soreness, left knee soreness and an abrasion to lateral left knee. Per daughter she appears slightly altered and slow to respond. She has been evaluated multiple times here in the past for falls secondary to weakness. Each time it was found that she had a UTI. The patient currently lives alone with occasional assisted living but is strongly considering moving in with her daughter. She denies nausea, vomiting, chest pain, shortness of breath, back pain and neck pain.    The history is provided by the patient.     Past Medical History:   Diagnosis Date    Arthritis     Depression     Diabetes mellitus     Fibromyalgia     Hypertension     Stroke      Past Surgical History:   Procedure Laterality Date    BACK SURGERY      2 x for ruptured disc    KNEE SURGERY      right knee-meniscus     Family History   Problem Relation Age of Onset    Diabetes Mother     Diabetes Sister      Social History   Substance Use Topics    " Smoking status: Current Every Day Smoker     Packs/day: 1.00     Years: 46.00     Types: Cigarettes    Smokeless tobacco: Never Used    Alcohol use Yes      Comment: socially     Review of Systems   Constitutional: Negative for fever.   HENT: Negative for sore throat.    Eyes: Negative for visual disturbance.   Respiratory: Negative for shortness of breath.    Cardiovascular: Negative for chest pain.   Gastrointestinal: Negative for nausea and vomiting.   Genitourinary: Negative for dysuria.   Musculoskeletal: Negative for back pain and neck pain.        (+) Right hip and left knee soreness   Skin: Positive for wound (small laceration above the left eye; small abrasion to lateral left knee).   Neurological: Positive for headaches. Negative for syncope, weakness and numbness.   Psychiatric/Behavioral: Positive for confusion.       Physical Exam   Initial Vitals   BP Pulse Resp Temp SpO2   05/08/17 0905 05/08/17 0905 05/08/17 0905 05/08/17 0905 05/08/17 0905   135/70 80 18 98.3 °F (36.8 °C) 99 %     Physical Exam  Constitutional: Well-developed, Well-nourished, No acute distressed, Alert  HENT: Normocephalic, Moist mucous membranes, abrasion above eyebrow with associated swelling and tenderness  Eyes: Conjunctiva normal, PERRL, EOMI  Neck: Supple, ROM normal, no JVD, no cervical tenderness  Cardiac: RRR, no murmurs  Pulmonary/Chest wall: No respiratory distress, CTAB, no chest wall tenderness  Abdomen: Soft, nontender, nondistended, no rebound, no guarding  Musc: tenderness to L knee with abrasion, No obvious joint swelling, painful ROM to R hip  Lymph: No lower extremity edema  Neuro: oriented x 3, no focal neurologic deficit  Skin: Pink, warm, dry.  No rashes  Psych: Behavior normal, Mood and affect normal    Previous medical record and nursing documentation reviewed where available.            ED Course   Procedures  Labs Reviewed   CBC W/ AUTO DIFFERENTIAL - Abnormal; Notable for the following:        Result  Value    RBC 3.54 (*)     Hemoglobin 9.7 (*)     Hematocrit 29.5 (*)     Lymph # 0.8 (*)     Mono # 0.2 (*)     Gran% 77.5 (*)     Lymph% 17.0 (*)     All other components within normal limits   COMPREHENSIVE METABOLIC PANEL - Abnormal; Notable for the following:     Albumin 3.4 (*)     Alkaline Phosphatase 38 (*)     ALT 7 (*)     All other components within normal limits   TROPONIN I   URINALYSIS     EKG Readings: (Independently Interpreted)   Initial Reading: No STEMI. Rhythm: Normal Sinus Rhythm. Heart Rate: 74. Ectopy: No Ectopy. Conduction: Normal. ST Segments: Normal ST Segments. T Waves: Normal. Clinical Impression: Normal Sinus Rhythm          Medical Decision Making:   Independently Interpreted Test(s):   I have ordered and independently interpreted EKG Reading(s) - see prior notes  Clinical Tests:   Lab Tests: Ordered and Reviewed  Radiological Study: Ordered and Reviewed  Medical Tests: Ordered and Reviewed  ED Management:  66 year old female presents to the ED after mechanical fall.  Patient states that she just lost her balance and fell but family is concerned that this usually indicates medical illness such as UTI.  No need at this time to close wound but patient cleaned and bandaged, wound care counseling given.  No fracture noted on CT face.  No intracranial bleed noted on CT head.  Xrays of hip and knee negative for fracture or dislocation.  Tetanus is up to date.  No signs of ischemia on EKG or troponin.  Labs remarkable only for signs of UTI.  Patient will be empirically started on antibiotics for UTI.  Urine culture ordered.  Antibiotics chosen based upon last sensitivities if available.  Will discharge patient home.  Patient and family counseled on fall safety.  Will discharge home with instructions to return at any time should symptoms worsen.             Scribe Attestation:   Scribe #1: I performed the above scribed service and the documentation accurately describes the services I performed.  I attest to the accuracy of the note.    Attending Attestation:           Physician Attestation for Scribe:  Physician Attestation Statement for Scribe #1: I, Ericka Garcia MD, reviewed documentation, as scribed by Nitin Lopez in my presence, and it is both accurate and complete.                 ED Course     Clinical Impression:   Diagnoses of Fall and Fall were pertinent to this visit.          Ericka Garcia MD  06/07/17 0106

## 2017-05-08 NOTE — ED NOTES
Pt ambulated to bathroom but dropped urine sample in the toilet and is unable to produce a new one at this time

## 2017-09-02 ENCOUNTER — HOSPITAL ENCOUNTER (EMERGENCY)
Facility: HOSPITAL | Age: 67
Discharge: HOME | End: 2017-09-03
Attending: EMERGENCY MEDICINE
Payer: MEDICARE

## 2017-09-02 DIAGNOSIS — R10.9 ABDOMINAL CRAMPING: Primary | ICD-10-CM

## 2017-09-02 DIAGNOSIS — K92.1 BLACK STOOL: ICD-10-CM

## 2017-09-02 DIAGNOSIS — R19.7 DIARRHEA, UNSPECIFIED TYPE: ICD-10-CM

## 2017-09-02 LAB
ALBUMIN SERPL BCP-MCNC: 3.2 G/DL
ALP SERPL-CCNC: 61 U/L
ALT SERPL W/O P-5'-P-CCNC: 11 U/L
ANION GAP SERPL CALC-SCNC: 12 MMOL/L
AST SERPL-CCNC: 18 U/L
BACTERIA #/AREA URNS HPF: NORMAL /HPF
BASOPHILS # BLD AUTO: 0.02 K/UL
BASOPHILS NFR BLD: 0.4 %
BILIRUB SERPL-MCNC: 0.3 MG/DL
BILIRUB UR QL STRIP: NEGATIVE
BUN SERPL-MCNC: 19 MG/DL
CALCIUM SERPL-MCNC: 9.7 MG/DL
CHLORIDE SERPL-SCNC: 105 MMOL/L
CLARITY UR: ABNORMAL
CO2 SERPL-SCNC: 24 MMOL/L
COLOR UR: YELLOW
CREAT SERPL-MCNC: 1 MG/DL
CTP QC/QA: YES
DIFFERENTIAL METHOD: ABNORMAL
EOSINOPHIL # BLD AUTO: 0 K/UL
EOSINOPHIL NFR BLD: 0 %
ERYTHROCYTE [DISTWIDTH] IN BLOOD BY AUTOMATED COUNT: 15.5 %
EST. GFR  (AFRICAN AMERICAN): >60 ML/MIN/1.73 M^2
EST. GFR  (NON AFRICAN AMERICAN): 59 ML/MIN/1.73 M^2
FECAL OCCULT BLOOD, POC: NEGATIVE
GLUCOSE SERPL-MCNC: 124 MG/DL
GLUCOSE UR QL STRIP: NEGATIVE
HCT VFR BLD AUTO: 33.2 %
HGB BLD-MCNC: 10.7 G/DL
HGB UR QL STRIP: ABNORMAL
HYALINE CASTS #/AREA URNS LPF: 0 /LPF
KETONES UR QL STRIP: NEGATIVE
LEUKOCYTE ESTERASE UR QL STRIP: ABNORMAL
LIPASE SERPL-CCNC: 24 U/L
LYMPHOCYTES # BLD AUTO: 1.2 K/UL
LYMPHOCYTES NFR BLD: 25.1 %
MCH RBC QN AUTO: 25.1 PG
MCHC RBC AUTO-ENTMCNC: 32.2 G/DL
MCV RBC AUTO: 78 FL
MICROSCOPIC COMMENT: NORMAL
MONOCYTES # BLD AUTO: 0.4 K/UL
MONOCYTES NFR BLD: 7.5 %
NEUTROPHILS # BLD AUTO: 3.1 K/UL
NEUTROPHILS NFR BLD: 67 %
NITRITE UR QL STRIP: NEGATIVE
PH UR STRIP: 6 [PH] (ref 5–8)
PLATELET # BLD AUTO: 276 K/UL
PMV BLD AUTO: 10.6 FL
POTASSIUM SERPL-SCNC: 4 MMOL/L
PROT SERPL-MCNC: 8.4 G/DL
PROT UR QL STRIP: ABNORMAL
RBC # BLD AUTO: 4.26 M/UL
RBC #/AREA URNS HPF: 2 /HPF (ref 0–4)
SODIUM SERPL-SCNC: 141 MMOL/L
SP GR UR STRIP: 1.01 (ref 1–1.03)
SQUAMOUS #/AREA URNS HPF: 10 /HPF
TROPONIN I SERPL DL<=0.01 NG/ML-MCNC: <0.006 NG/ML
URN SPEC COLLECT METH UR: ABNORMAL
UROBILINOGEN UR STRIP-ACNC: NEGATIVE EU/DL
WBC # BLD AUTO: 4.66 K/UL
WBC #/AREA URNS HPF: 3 /HPF (ref 0–5)

## 2017-09-02 PROCEDURE — 85025 COMPLETE CBC W/AUTO DIFF WBC: CPT

## 2017-09-02 PROCEDURE — 25000003 PHARM REV CODE 250: Performed by: EMERGENCY MEDICINE

## 2017-09-02 PROCEDURE — 25500020 PHARM REV CODE 255: Performed by: EMERGENCY MEDICINE

## 2017-09-02 PROCEDURE — 81000 URINALYSIS NONAUTO W/SCOPE: CPT

## 2017-09-02 PROCEDURE — 96374 THER/PROPH/DIAG INJ IV PUSH: CPT

## 2017-09-02 PROCEDURE — 80053 COMPREHEN METABOLIC PANEL: CPT

## 2017-09-02 PROCEDURE — 84484 ASSAY OF TROPONIN QUANT: CPT

## 2017-09-02 PROCEDURE — 99284 EMERGENCY DEPT VISIT MOD MDM: CPT | Mod: 25

## 2017-09-02 PROCEDURE — 82272 OCCULT BLD FECES 1-3 TESTS: CPT

## 2017-09-02 PROCEDURE — 83690 ASSAY OF LIPASE: CPT

## 2017-09-02 PROCEDURE — 63600175 PHARM REV CODE 636 W HCPCS: Performed by: EMERGENCY MEDICINE

## 2017-09-02 PROCEDURE — 96361 HYDRATE IV INFUSION ADD-ON: CPT

## 2017-09-02 PROCEDURE — 87086 URINE CULTURE/COLONY COUNT: CPT

## 2017-09-02 RX ORDER — LOPERAMIDE HYDROCHLORIDE 2 MG/1
4 CAPSULE ORAL
Status: COMPLETED | OUTPATIENT
Start: 2017-09-02 | End: 2017-09-02

## 2017-09-02 RX ORDER — MORPHINE SULFATE 10 MG/ML
5 INJECTION INTRAMUSCULAR; INTRAVENOUS; SUBCUTANEOUS ONCE
Status: COMPLETED | OUTPATIENT
Start: 2017-09-02 | End: 2017-09-02

## 2017-09-02 RX ADMIN — LOPERAMIDE HYDROCHLORIDE 4 MG: 2 CAPSULE ORAL at 11:09

## 2017-09-02 RX ADMIN — SODIUM CHLORIDE 1000 ML: 0.9 INJECTION, SOLUTION INTRAVENOUS at 08:09

## 2017-09-02 RX ADMIN — IOHEXOL 15 ML: 300 INJECTION, SOLUTION INTRAVENOUS at 10:09

## 2017-09-02 RX ADMIN — IOHEXOL 75 ML: 350 INJECTION, SOLUTION INTRAVENOUS at 10:09

## 2017-09-02 RX ADMIN — MORPHINE SULFATE 5 MG: 10 INJECTION INTRAVENOUS at 09:09

## 2017-09-03 VITALS
BODY MASS INDEX: 16.88 KG/M2 | SYSTOLIC BLOOD PRESSURE: 133 MMHG | OXYGEN SATURATION: 100 % | HEIGHT: 69 IN | WEIGHT: 114 LBS | TEMPERATURE: 98 F | HEART RATE: 74 BPM | RESPIRATION RATE: 18 BRPM | DIASTOLIC BLOOD PRESSURE: 85 MMHG

## 2017-09-03 NOTE — DISCHARGE INSTRUCTIONS
"Return to the Emergency Department of any acute worsening of your symptoms or for any other concern.     You should return to the ED for fever/chills, shortness of breath, chest pain, weakness or "passing out".     Pt should take all medications as prescribed.    Pt should follow up with PCP as soon as possible.    The risks associated with not taking your medications as prescribed and not following up with your Primary Care doctor or sub specialist includes worsening of your condition, pain, disability, loss of function or livelihood, and death      REINIER Hassan M.D. 11:16 PM 9/2/2017      Our goal in the emergency department is to always give you outstanding care and exceptional service. You may receive a survey by mail or e-mail in the next week regarding your experience in our ED. We would greatly appreciate your completing and returning the survey. Your feedback provides us with a way to recognize our staff who give very good care and it helps us learn how to improve when your experience was below our aspiration of excellence.     "

## 2017-09-03 NOTE — ED PROVIDER NOTES
Encounter Date: 9/2/2017    SCRIBE #1 NOTE: I, Gilberto Curtis, am scribing for, and in the presence of,  Paul Hassan MD. I have scribed the following portions of the note - Other sections scribed: HPI, ROS.       History     Chief Complaint   Patient presents with    Abdominal Pain     Abdominal pain, dark liquid stools X 5 days. Denies vomiting.      CC: Abdominal Pain    HPI: This 67 y.o. Female with PMHx HTN, DM, stroke, arthritis, fibromyalgia, depression, and PSHx back surgery and knee surgery presents to the ED c/o diarrhea with black stool for the past x5 days. Pt says she usually takes immodium but her daughter gave her Pepto Bismol instead today, just prior to diarrhea onset. Pt reports associated abd pain and fatigue. Pt denies vomiting and hematemesis. Pt denies having black stool in the past and reports past colonoscopy with no problems. Pt denies taking ibuprofen or Goody's recently.          Review of patient's allergies indicates:  No Known Allergies  Past Medical History:   Diagnosis Date    Arthritis     Depression     Diabetes mellitus     Fibromyalgia     Hypertension     Stroke      Past Surgical History:   Procedure Laterality Date    BACK SURGERY      2 x for ruptured disc    KNEE SURGERY      right knee-meniscus     Family History   Problem Relation Age of Onset    Diabetes Mother     Diabetes Sister      Social History   Substance Use Topics    Smoking status: Current Every Day Smoker     Packs/day: 1.00     Years: 46.00     Types: Cigarettes    Smokeless tobacco: Never Used    Alcohol use Yes      Comment: socially     Review of Systems   Constitutional: Positive for fatigue. Negative for fever.   HENT: Negative for sore throat.    Respiratory: Negative for shortness of breath.    Cardiovascular: Negative for chest pain.   Gastrointestinal: Positive for abdominal pain and diarrhea (x5 days, black stool). Negative for nausea and vomiting (hematemesis).   Genitourinary:  Negative for dysuria.   Musculoskeletal: Negative for back pain.   Skin: Negative for rash.   Neurological: Negative for weakness.   Hematological: Does not bruise/bleed easily.       Physical Exam     Initial Vitals [09/02/17 1754]   BP Pulse Resp Temp SpO2   (!) 148/80 83 18 97.7 °F (36.5 °C) 99 %      MAP       102.67         Physical Exam    Vitals reviewed.  Constitutional: She appears well-developed and well-nourished.   HENT:   Head: Normocephalic and atraumatic.   Nose: Nose normal.   Mouth/Throat: No oropharyngeal exudate.   Eyes: EOM are normal. Pupils are equal, round, and reactive to light.   Neck: Normal range of motion. Neck supple. No JVD present.   Cardiovascular: Regular rhythm and normal heart sounds. Exam reveals no gallop and no friction rub.    No murmur heard.  Pulmonary/Chest: Breath sounds normal. No stridor. No respiratory distress. She has no wheezes. She has no rhonchi. She has no rales. She exhibits no tenderness.   Abdominal: Soft. Bowel sounds are normal. She exhibits no distension and no mass. There is no tenderness. There is no rebound and no guarding.   Genitourinary: Rectal exam shows guaiac negative stool. Guaiac negative stool. : Acceptable.  Genitourinary Comments: Scant black stool. NO blood   Musculoskeletal: Normal range of motion. She exhibits no edema or tenderness.   Neurological: She is alert and oriented to person, place, and time. She has normal strength. No sensory deficit.   Skin: Skin is warm and dry.   Psychiatric: She has a normal mood and affect. Thought content normal.         ED Course   Procedures  Labs Reviewed   COMPREHENSIVE METABOLIC PANEL - Abnormal; Notable for the following:        Result Value    Glucose 124 (*)     Albumin 3.2 (*)     eGFR if non  59 (*)     All other components within normal limits   CBC W/ AUTO DIFFERENTIAL - Abnormal; Notable for the following:     Hemoglobin 10.7 (*)     Hematocrit 33.2 (*)     MCV  78 (*)     MCH 25.1 (*)     RDW 15.5 (*)     All other components within normal limits   URINALYSIS - Abnormal; Notable for the following:     Appearance, UA Hazy (*)     Protein, UA 2+ (*)     Occult Blood UA 1+ (*)     Leukocytes, UA 1+ (*)     All other components within normal limits   CULTURE, URINE   LIPASE   TROPONIN I   URINALYSIS MICROSCOPIC   POCT OCCULT BLOOD STOOL             Medical Decision Making:   ED Management:  This is an emergent evaluation of the patient complaining of diarrhea and black stool and abdominal pain.  My differential diagnosis includes mesenteric ischemia, obstruction, appendicitis, pancreatitis, cholecystitis, peptic ulcer disease,diverticulitis, colitis, volvulus, hernia, UTI, gastritis and gastroenteritis.    I decided to obtain and review the old medical record.    The patient does clinically have gastroenteritis.  A CT scan was performed and was negative for mesenteric ischemia, obstruction, appendicitis, cholecystitis, diverticulitis, colitis, and hernia.  The patient's labs are otherwise normal.  Occult blood test of stool was negative. Black stool likely due to new use of pepto.       The patient reports that symptomatically, symptoms have improved.    The patient will be discharged home with the following medications:  Patient will follow up with primary care physician.    The results and physical exam findings were reviewed with the patient. Pt agrees with assessment, disposition and treatment plan and has no further questions or complaints at this time.      REINIER Hassan M.D. 11:44 PM 9/2/2017                Scribe Attestation:   Scribe #1: I performed the above scribed service and the documentation accurately describes the services I performed. I attest to the accuracy of the note.    Attending Attestation:           Physician Attestation for Scribe:  Physician Attestation Statement for Scribe #1: I, Paul Hassan MD, reviewed documentation, as scribed by Gilberto  Ever in my presence, and it is both accurate and complete.                 ED Course      Clinical Impression:   The primary encounter diagnosis was Abdominal cramping. Diagnoses of Diarrhea, unspecified type and Black stool- medication induced were also pertinent to this visit.                           Paul Hassan MD  09/02/17 1838

## 2017-09-03 NOTE — ED TRIAGE NOTES
Patient presented to the ED stating that she has been diarrhea and pain in her abdomen for about 5 days. Patient stated that her stool was black. Patient stating being nauseous and dizzy. NAD noted at this time. Patient AAOx3.

## 2017-09-04 LAB — BACTERIA UR CULT: NORMAL

## 2018-04-23 ENCOUNTER — HOSPITAL ENCOUNTER (EMERGENCY)
Facility: HOSPITAL | Age: 68
Discharge: HOME | End: 2018-04-23
Attending: EMERGENCY MEDICINE
Payer: MEDICARE

## 2018-04-23 VITALS
DIASTOLIC BLOOD PRESSURE: 85 MMHG | HEART RATE: 110 BPM | TEMPERATURE: 99 F | RESPIRATION RATE: 18 BRPM | OXYGEN SATURATION: 100 % | BODY MASS INDEX: 17.77 KG/M2 | SYSTOLIC BLOOD PRESSURE: 169 MMHG | HEIGHT: 69 IN | WEIGHT: 120 LBS

## 2018-04-23 DIAGNOSIS — S80.811A ABRASION OF RIGHT LOWER EXTREMITY, INITIAL ENCOUNTER: ICD-10-CM

## 2018-04-23 DIAGNOSIS — S80.812A ABRASION OF LEFT LOWER EXTREMITY, INITIAL ENCOUNTER: ICD-10-CM

## 2018-04-23 DIAGNOSIS — E16.2 HYPOGLYCEMIA: Primary | ICD-10-CM

## 2018-04-23 DIAGNOSIS — S70.211A: ICD-10-CM

## 2018-04-23 DIAGNOSIS — N30.01 ACUTE CYSTITIS WITH HEMATURIA: ICD-10-CM
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
## 2018-04-23 LAB
ALBUMIN SERPL BCP-MCNC: 2.9 G/DL
ALP SERPL-CCNC: 65 U/L
ALT SERPL W/O P-5'-P-CCNC: 23 U/L
ANION GAP SERPL CALC-SCNC: 8 MMOL/L
AST SERPL-CCNC: 59 U/L
BACTERIA #/AREA URNS HPF: ABNORMAL /HPF
BASOPHILS # BLD AUTO: 0.02 K/UL
BASOPHILS NFR BLD: 0.5 %
BILIRUB SERPL-MCNC: 0.5 MG/DL
BILIRUB UR QL STRIP: NEGATIVE
BUN SERPL-MCNC: 21 MG/DL
CALCIUM SERPL-MCNC: 9.7 MG/DL
CHLORIDE SERPL-SCNC: 105 MMOL/L
CLARITY UR: CLEAR
CO2 SERPL-SCNC: 21 MMOL/L
COLOR UR: YELLOW
CREAT SERPL-MCNC: 1.1 MG/DL
DIFFERENTIAL METHOD: ABNORMAL
EOSINOPHIL # BLD AUTO: 0 K/UL
EOSINOPHIL NFR BLD: 0 %
ERYTHROCYTE [DISTWIDTH] IN BLOOD BY AUTOMATED COUNT: 14.6 %
EST. GFR  (AFRICAN AMERICAN): >60 ML/MIN/1.73 M^2
EST. GFR  (NON AFRICAN AMERICAN): 52 ML/MIN/1.73 M^2
GLUCOSE SERPL-MCNC: 53 MG/DL
GLUCOSE UR QL STRIP: ABNORMAL
HCT VFR BLD AUTO: 31.5 %
HGB BLD-MCNC: 10.6 G/DL
HGB UR QL STRIP: ABNORMAL
HYALINE CASTS #/AREA URNS LPF: 0 /LPF
KETONES UR QL STRIP: NEGATIVE
LEUKOCYTE ESTERASE UR QL STRIP: ABNORMAL
LYMPHOCYTES # BLD AUTO: 0.4 K/UL
LYMPHOCYTES NFR BLD: 10.2 %
MCH RBC QN AUTO: 26.2 PG
MCHC RBC AUTO-ENTMCNC: 33.7 G/DL
MCV RBC AUTO: 78 FL
MICROSCOPIC COMMENT: ABNORMAL
MONOCYTES # BLD AUTO: 0.2 K/UL
MONOCYTES NFR BLD: 5.1 %
NEUTROPHILS # BLD AUTO: 3.6 K/UL
NEUTROPHILS NFR BLD: 84 %
NITRITE UR QL STRIP: NEGATIVE
PH UR STRIP: 5 [PH] (ref 5–8)
PLATELET # BLD AUTO: 313 K/UL
PMV BLD AUTO: 10.3 FL
POCT GLUCOSE: 90 MG/DL (ref 70–110)
POCT GLUCOSE: 99 MG/DL (ref 70–110)
POTASSIUM SERPL-SCNC: 4 MMOL/L
PROT SERPL-MCNC: 8 G/DL
PROT UR QL STRIP: ABNORMAL
RBC # BLD AUTO: 4.04 M/UL
RBC #/AREA URNS HPF: 2 /HPF (ref 0–4)
SODIUM SERPL-SCNC: 134 MMOL/L
SP GR UR STRIP: 1.01 (ref 1–1.03)
SQUAMOUS #/AREA URNS HPF: 3 /HPF
URN SPEC COLLECT METH UR: ABNORMAL
UROBILINOGEN UR STRIP-ACNC: NEGATIVE EU/DL
WBC # BLD AUTO: 4.3 K/UL
WBC #/AREA URNS HPF: 15 /HPF (ref 0–5)

## 2018-04-23 PROCEDURE — 85025 COMPLETE CBC W/AUTO DIFF WBC: CPT

## 2018-04-23 PROCEDURE — 87086 URINE CULTURE/COLONY COUNT: CPT

## 2018-04-23 PROCEDURE — 87088 URINE BACTERIA CULTURE: CPT

## 2018-04-23 PROCEDURE — 25000003 PHARM REV CODE 250: Performed by: EMERGENCY MEDICINE

## 2018-04-23 PROCEDURE — 80053 COMPREHEN METABOLIC PANEL: CPT

## 2018-04-23 PROCEDURE — 96374 THER/PROPH/DIAG INJ IV PUSH: CPT

## 2018-04-23 PROCEDURE — 87077 CULTURE AEROBIC IDENTIFY: CPT

## 2018-04-23 PROCEDURE — 63600175 PHARM REV CODE 636 W HCPCS: Performed by: EMERGENCY MEDICINE

## 2018-04-23 PROCEDURE — 96361 HYDRATE IV INFUSION ADD-ON: CPT

## 2018-04-23 PROCEDURE — 99285 EMERGENCY DEPT VISIT HI MDM: CPT | Mod: 25

## 2018-04-23 PROCEDURE — 87186 SC STD MICRODIL/AGAR DIL: CPT

## 2018-04-23 PROCEDURE — 81000 URINALYSIS NONAUTO W/SCOPE: CPT

## 2018-04-23 PROCEDURE — 82962 GLUCOSE BLOOD TEST: CPT

## 2018-04-23 RX ORDER — CEFTRIAXONE 1 G/1
1 INJECTION, POWDER, FOR SOLUTION INTRAMUSCULAR; INTRAVENOUS
Status: COMPLETED | OUTPATIENT
Start: 2018-04-23 | End: 2018-04-23

## 2018-04-23 RX ORDER — NITROFURANTOIN 25; 75 MG/1; MG/1
100 CAPSULE ORAL 2 TIMES DAILY
Qty: 14 CAPSULE | Refills: 0 | Status: SHIPPED | OUTPATIENT
Start: 2018-04-23 | End: 2018-04-30

## 2018-04-23 RX ADMIN — SODIUM CHLORIDE 500 ML: 0.9 INJECTION, SOLUTION INTRAVENOUS at 01:04

## 2018-04-23 RX ADMIN — SODIUM CHLORIDE 500 ML: 0.9 INJECTION, SOLUTION INTRAVENOUS at 05:04

## 2018-04-23 RX ADMIN — CEFTRIAXONE SODIUM 1 G: 1 INJECTION, POWDER, FOR SOLUTION INTRAMUSCULAR; INTRAVENOUS at 05:04

## 2018-04-23 NOTE — ED NOTES
Patient stated she needed to use the restroom. Provided bedpan but patient used undergarment first. Informed patient I needed a urine sample . She v/u. Will inform nurse when she needs to void.

## 2018-04-23 NOTE — ED NOTES
Patient HR on monitor showed 155 to 160. Assessed patient and she was eating with finger probe moving. On assessment HR went back to normal. Patient asymptomatic. No complaints at this time. Family at side. Continue to monitor.

## 2018-04-23 NOTE — ED TRIAGE NOTES
Patient stated that she hasn't felt well for several days. Diarrhea on yesterday but none today. Loss of appetite x2 days. When care provider showed up today for visit she found patient on floor and her blood sugar was 16. Patient brought to ED by EMS. No fever or chills noted.

## 2018-04-23 NOTE — ED PROVIDER NOTES
Encounter Date: 4/23/2018    SCRIBE #1 NOTE: I, Suzan De Jesus, am scribing for, and in the presence of,  Rhys Patel MD. I have scribed the following portions of the note - Other sections scribed: ROS and HPI.       History     Chief Complaint   Patient presents with    Hypoglycemia     pt found with AMS. Initial CBG on scene was 16, EMS gave amp of D50 CBG paulette to 55, another 1/2 amp of D50 was given by EMS and CBG on arrival was 87    Diarrhea     x 2 days     CC: Hypoglycemia; Diarrhea    HPI: This 67 y.o. female with a past medical history of Arthritis; Depression; Diabetes mellitus; Fibromyalgia; Hypertension; and Stroke, presents to the ED via EMS c/o for hypoglycemia. Initial CBG of 16 was noted at the scene. EMS gave the patient amp of D50 with improvement in CBG to 55, another 1/2 amp was given by EMS and patient had a CBG of 87 upon arrival to ED. Per daughter, the patient has watery non bloody diarrhea all day yesterday and had fallen twice due to weakness yesterday. Patient kept down Gatorade, but did not finish red beans and toast yesterday. She notes patient usually eats small portions frequently throughout the day. Patient was refusing to go with her daughter to her home. Daughter left portable toilet next to the patient's bed last night before she left. Daughter notes caregiver found the patient unresponsive  on a floor next to the bathroom and activated the EMS. Daughter notes patient started on daily insulin on 04/04/2018. Daughter gives her 15 units of insulin  everyday between 1192-3349. She also notes patient has hx of chronic UTI. Her last UTI was over 3 months ago. Denies any other complaints at this time.      The history is provided by the patient, the EMS personnel and a relative (daughter). No  was used.     Review of patient's allergies indicates:  No Known Allergies  Past Medical History:   Diagnosis Date    Arthritis     Depression     Diabetes mellitus      Fibromyalgia     Hypertension     Stroke      Past Surgical History:   Procedure Laterality Date    BACK SURGERY      2 x for ruptured disc    KNEE SURGERY      right knee-meniscus     Family History   Problem Relation Age of Onset    Diabetes Mother     Diabetes Sister      Social History   Substance Use Topics    Smoking status: Current Every Day Smoker     Packs/day: 1.00     Years: 46.00     Types: Cigarettes    Smokeless tobacco: Never Used    Alcohol use Yes      Comment: socially     Review of Systems   Constitutional: Negative for chills and fever.   HENT: Negative for ear pain and sore throat.    Eyes: Negative for pain.   Respiratory: Negative for cough and shortness of breath.    Cardiovascular: Negative for chest pain.   Gastrointestinal: Positive for diarrhea. Negative for abdominal pain, blood in stool, nausea and vomiting.   Genitourinary: Negative for dysuria.   Musculoskeletal: Negative for back pain.        (-) arm or leg problems   Skin: Negative for rash.   Neurological: Positive for weakness (generalized). Negative for headaches.   All other systems reviewed and are negative.      Physical Exam     Initial Vitals [04/23/18 1257]   BP Pulse Resp Temp SpO2   (!) 149/68 106 18 97.4 °F (36.3 °C) 100 %      MAP       95         Physical Exam    Nursing note and vitals reviewed.  Constitutional: She appears well-developed and well-nourished. She is not diaphoretic. No distress.   The patient is AAOx3. She appears frail. Mucus membranes are dry. Skin is dry. Very few scattered trace superficial abrasions to bilateral anterior shin.   HENT:   Head: Normocephalic and atraumatic.   Eyes: EOM are normal.   Neck: Neck supple.   Cardiovascular: Normal rate and regular rhythm.   Pulmonary/Chest: Breath sounds normal. No respiratory distress. She has no wheezes. She has no rhonchi. She has no rales. She exhibits no tenderness.   Abdominal: Soft. Normal appearance and bowel sounds are normal. She  exhibits no distension. There is no tenderness. There is no rebound and no guarding.   Musculoskeletal: Normal range of motion. She exhibits no edema.   Neurological: She is alert and oriented to person, place, and time.   Skin: Skin is warm and dry.   Psychiatric: She has a normal mood and affect.         ED Course   Procedures  Labs Reviewed   CBC W/ AUTO DIFFERENTIAL   COMPREHENSIVE METABOLIC PANEL   URINALYSIS   POCT GLUCOSE MONITORING CONTINUOUS             Medical Decision Making:   Clinical Tests:   Lab Tests: Ordered and Reviewed  ED Management:  Ms Villalpando has had mild diarrhea for 2 days and has been eating a little less. Family states she has fallen twice yesterday, and that happens b/c she doesn't want to use her walker. Fell again this morning and EMS called and noted low glucose.     Ms Villalpando has done well in ED, has eaten a full meal. Labs, CT, imaging noted and discussed w family member. Small abrasions to shins and one to R hip noted.   Mild suprapubic discomfort mentioned just prior to discharge, felt to be related to uti, discussed home care and worrisome signs that should prompt need to return to er should they occur.     Advised to hold insulin until f/u w pcp and check sugars daily. Pt has been on metformin for many years then stopped DM meds b/c not needed for ~6months then started on insulin 2 weeks ago. Having problems getting glucometer and strips covered by insurance, I will call in rx for starter kit.           Imaging Results          X-Ray Hip 2 View Right (Final result)  Result time 04/23/18 18:02:51    Final result by Quang Avina MD (04/23/18 18:02:51)                 Impression:      1. Marked degenerative changes of the hips limit evaluation, no definite acute displaced fracture or dislocation of the right hip.      Electronically signed by: Quang Avina MD  Date:    04/23/2018  Time:    18:02             Narrative:    EXAMINATION:  XR HIP 2 VIEW RIGHT    CLINICAL  HISTORY:  Unspecified fall, initial encounter    TECHNIQUE:  AP view of the pelvis and frog leg lateral view of the right hip were performed.    COMPARISON:  05/08/2017    FINDINGS:  Three views.    There is osteopenia.  Exposure and positioning limits evaluation.  The sacroiliac joints are grossly intact.  Severe degenerative changes are noted of the bilateral femoral acetabular joints with deformity of the bilateral femoral heads, the appearance is grossly similar to the previous examination.  Prominent bilateral femoral acetabular osteophytosis noted.  No convincing acute displaced fracture or dislocation of the pelvis or right hip.                               X-Ray Knee 1 or 2 View Right (Final result)  Result time 04/23/18 18:01:37    Final result by Quang Avina MD (04/23/18 18:01:37)                 Impression:      1. Significant degenerative change, limits evaluation for acute displaced fracture although no definite fracture seen.      Electronically signed by: Quang Avina MD  Date:    04/23/2018  Time:    18:01             Narrative:    EXAMINATION:  XR KNEE 1 OR 2 VIEW RIGHT    CLINICAL HISTORY:  Unspecified fall, initial encounter    TECHNIQUE:  AP and lateral views of the right knee were performed.    COMPARISON:  None    FINDINGS:  Two views.    There is tricompartmental degenerative change of the knee, with significant right compartment on narrowing.  Prominent osteophytes are noted throughout.  No convincing acute displaced fracture or dislocation of the knee.  No large knee joint effusion.                               CT Head Without Contrast (Final result)  Result time 04/23/18 17:42:12    Final result by Cosme Perkins MD (04/23/18 17:42:12)                 Impression:      No acute intracranial process.    Stable appearance of old infarct in the left PCA territory.    Stable parenchymal calcifications.      Electronically signed by: Cosme Perkins MD  Date:    04/23/2018  Time:    17:42              Narrative:    EXAMINATION:  CT HEAD WITHOUT CONTRAST    CLINICAL HISTORY:  frequent falls, diarrhea, ams, low glucose;    TECHNIQUE:  Low dose axial images were obtained through the head.  Coronal and sagittal reformations were also performed. Contrast was not administered.    COMPARISON:  CT scan of the head dated 05/08/2017    FINDINGS:  The subcutaneous tissues are within normal limits.  The bony calvarium is intact.  The paranasal sinuses and mastoid air cells are clear.  There are postoperative changes in the left orbit.  The right orbit is unremarkable.    The craniocervical junction is within normal limits.  There are stable calcifications in the basal ganglia and left thalamus.  There is also a stable area of calcification in the right inferior frontal lobe.  There is a 4 mm area of increased attenuation the extra-axial space in the left frontal region.  This most likely represent a prominent cortical vein as it was seen on the prior examination dated 09/16/2015.   There is stable encephalomalacia in the left occipital and parietal lobes.  The gray-white differentiation a is maintained.  There is no evidence of mass effect.                                     Scribe Attestation:   Scribe #1: I performed the above scribed service and the documentation accurately describes the services I performed. I attest to the accuracy of the note.    Attending Attestation:           Physician Attestation for Scribe:  Physician Attestation Statement for Scribe #1: I, Rhys Patel MD, reviewed documentation, as scribed by Suzan De Jesus in my presence, and it is both accurate and complete.                    Clinical Impression:   There were no encounter diagnoses.     1. Fall  2. Hypoglycemia  3. Right hip abrasion  4. R lower extremity abrasion  5. L lower extremity abrasion  6. Acute cystitis w hematuria                      Rhys Patel MD  04/24/18 0927

## 2018-04-24 NOTE — DISCHARGE INSTRUCTIONS
Rest. Drink plenty of fluids and eat regularly. Return for any new or acute problems or concerns.

## 2018-04-25 LAB — BACTERIA UR CULT: NORMAL

## 2019-10-24 ENCOUNTER — HOSPITAL ENCOUNTER (EMERGENCY)
Facility: HOSPITAL | Age: 69
Discharge: HOME | End: 2019-10-25
Attending: EMERGENCY MEDICINE
Payer: MEDICARE

## 2019-10-24 DIAGNOSIS — S09.90XA CLOSED HEAD INJURY, INITIAL ENCOUNTER: ICD-10-CM

## 2019-10-24 DIAGNOSIS — R51.9 NONINTRACTABLE EPISODIC HEADACHE, UNSPECIFIED HEADACHE TYPE: Primary | ICD-10-CM

## 2019-10-24 LAB — POCT GLUCOSE: 190 MG/DL (ref 70–110)

## 2019-10-24 PROCEDURE — 99284 EMERGENCY DEPT VISIT MOD MDM: CPT | Mod: 25

## 2019-10-24 PROCEDURE — 82962 GLUCOSE BLOOD TEST: CPT

## 2019-10-24 RX ORDER — ACETAMINOPHEN 500 MG
1000 TABLET ORAL
Status: COMPLETED | OUTPATIENT
Start: 2019-10-25 | End: 2019-10-25

## 2019-10-25 VITALS
TEMPERATURE: 98 F | WEIGHT: 135 LBS | HEIGHT: 71 IN | DIASTOLIC BLOOD PRESSURE: 93 MMHG | HEART RATE: 86 BPM | OXYGEN SATURATION: 99 % | RESPIRATION RATE: 16 BRPM | BODY MASS INDEX: 18.9 KG/M2 | SYSTOLIC BLOOD PRESSURE: 132 MMHG

## 2019-10-25 LAB
ALBUMIN SERPL BCP-MCNC: 3.8 G/DL (ref 3.5–5.2)
ALP SERPL-CCNC: 92 U/L (ref 55–135)
ALT SERPL W/O P-5'-P-CCNC: 10 U/L (ref 10–44)
ANION GAP SERPL CALC-SCNC: 7 MMOL/L (ref 8–16)
AST SERPL-CCNC: 17 U/L (ref 10–40)
BASOPHILS # BLD AUTO: 0.03 K/UL (ref 0–0.2)
BASOPHILS NFR BLD: 0.9 % (ref 0–1.9)
BILIRUB SERPL-MCNC: 0.4 MG/DL (ref 0.1–1)
BUN SERPL-MCNC: 22 MG/DL (ref 8–23)
CALCIUM SERPL-MCNC: 9.9 MG/DL (ref 8.7–10.5)
CHLORIDE SERPL-SCNC: 107 MMOL/L (ref 95–110)
CO2 SERPL-SCNC: 22 MMOL/L (ref 23–29)
CREAT SERPL-MCNC: 1.3 MG/DL (ref 0.5–1.4)
DIFFERENTIAL METHOD: ABNORMAL
EOSINOPHIL # BLD AUTO: 0.1 K/UL (ref 0–0.5)
EOSINOPHIL NFR BLD: 2.1 % (ref 0–8)
ERYTHROCYTE [DISTWIDTH] IN BLOOD BY AUTOMATED COUNT: 13.2 % (ref 11.5–14.5)
EST. GFR  (AFRICAN AMERICAN): 49 ML/MIN/1.73 M^2
EST. GFR  (NON AFRICAN AMERICAN): 42 ML/MIN/1.73 M^2
GLUCOSE SERPL-MCNC: 151 MG/DL (ref 70–110)
HCT VFR BLD AUTO: 33.8 % (ref 37–48.5)
HGB BLD-MCNC: 11 G/DL (ref 12–16)
IMM GRANULOCYTES # BLD AUTO: 0.02 K/UL (ref 0–0.04)
IMM GRANULOCYTES NFR BLD AUTO: 0.6 % (ref 0–0.5)
LYMPHOCYTES # BLD AUTO: 1.4 K/UL (ref 1–4.8)
LYMPHOCYTES NFR BLD: 41.7 % (ref 18–48)
MCH RBC QN AUTO: 27.8 PG (ref 27–31)
MCHC RBC AUTO-ENTMCNC: 32.5 G/DL (ref 32–36)
MCV RBC AUTO: 85 FL (ref 82–98)
MONOCYTES # BLD AUTO: 0.3 K/UL (ref 0.3–1)
MONOCYTES NFR BLD: 9.7 % (ref 4–15)
NEUTROPHILS # BLD AUTO: 1.5 K/UL (ref 1.8–7.7)
NEUTROPHILS NFR BLD: 45 % (ref 38–73)
NRBC BLD-RTO: 0 /100 WBC
PLATELET # BLD AUTO: 173 K/UL (ref 150–350)
PMV BLD AUTO: 10.2 FL (ref 9.2–12.9)
POTASSIUM SERPL-SCNC: 4.6 MMOL/L (ref 3.5–5.1)
PROT SERPL-MCNC: 8.7 G/DL (ref 6–8.4)
RBC # BLD AUTO: 3.96 M/UL (ref 4–5.4)
SODIUM SERPL-SCNC: 136 MMOL/L (ref 136–145)
WBC # BLD AUTO: 3.31 K/UL (ref 3.9–12.7)

## 2019-10-25 PROCEDURE — 85025 COMPLETE CBC W/AUTO DIFF WBC: CPT

## 2019-10-25 PROCEDURE — 25000003 PHARM REV CODE 250: Performed by: EMERGENCY MEDICINE

## 2019-10-25 PROCEDURE — 80053 COMPREHEN METABOLIC PANEL: CPT

## 2019-10-25 RX ADMIN — ACETAMINOPHEN 1000 MG: 500 TABLET ORAL at 12:10
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
## 2019-10-28 ENCOUNTER — HOSPITAL ENCOUNTER (INPATIENT)
Facility: HOSPITAL | Age: 69
LOS: 3 days | Discharge: HOME HEALTH | DRG: 065 | End: 2019-10-31
Attending: EMERGENCY MEDICINE | Admitting: HOSPITALIST
Payer: MEDICARE

## 2019-10-28 DIAGNOSIS — I63.9 CEREBROVASCULAR ACCIDENT (CVA), UNSPECIFIED MECHANISM: ICD-10-CM

## 2019-10-28 DIAGNOSIS — I63.9 STROKE: Primary | ICD-10-CM

## 2019-10-28 DIAGNOSIS — I63.9 ACUTE CEREBRAL INFARCTION: ICD-10-CM

## 2019-10-28 LAB
ALBUMIN SERPL BCP-MCNC: 4 G/DL (ref 3.5–5.2)
ALP SERPL-CCNC: 94 U/L (ref 55–135)
ALT SERPL W/O P-5'-P-CCNC: 13 U/L (ref 10–44)
ANION GAP SERPL CALC-SCNC: 9 MMOL/L (ref 8–16)
APTT BLDCRRT: 27.2 SEC (ref 21–32)
AST SERPL-CCNC: 21 U/L (ref 10–40)
BACTERIA #/AREA URNS HPF: ABNORMAL /HPF
BASOPHILS # BLD AUTO: 0.03 K/UL (ref 0–0.2)
BASOPHILS NFR BLD: 0.8 % (ref 0–1.9)
BILIRUB SERPL-MCNC: 0.4 MG/DL (ref 0.1–1)
BILIRUB UR QL STRIP: NEGATIVE
BUN SERPL-MCNC: 25 MG/DL (ref 8–23)
CALCIUM SERPL-MCNC: 10.5 MG/DL (ref 8.7–10.5)
CHLORIDE SERPL-SCNC: 109 MMOL/L (ref 95–110)
CHOLEST SERPL-MCNC: 244 MG/DL (ref 120–199)
CHOLEST/HDLC SERPL: 6.8 {RATIO} (ref 2–5)
CLARITY UR: CLEAR
CO2 SERPL-SCNC: 20 MMOL/L (ref 23–29)
COLOR UR: YELLOW
CREAT SERPL-MCNC: 1.3 MG/DL (ref 0.5–1.4)
DIFFERENTIAL METHOD: ABNORMAL
EOSINOPHIL # BLD AUTO: 0 K/UL (ref 0–0.5)
EOSINOPHIL NFR BLD: 0.5 % (ref 0–8)
ERYTHROCYTE [DISTWIDTH] IN BLOOD BY AUTOMATED COUNT: 13.2 % (ref 11.5–14.5)
EST. GFR  (AFRICAN AMERICAN): 49 ML/MIN/1.73 M^2
EST. GFR  (NON AFRICAN AMERICAN): 42 ML/MIN/1.73 M^2
GLUCOSE SERPL-MCNC: 210 MG/DL (ref 70–110)
GLUCOSE UR QL STRIP: NEGATIVE
HCT VFR BLD AUTO: 36.3 % (ref 37–48.5)
HDLC SERPL-MCNC: 36 MG/DL (ref 40–75)
HDLC SERPL: 14.8 % (ref 20–50)
HGB BLD-MCNC: 11.7 G/DL (ref 12–16)
HGB UR QL STRIP: NEGATIVE
HYALINE CASTS #/AREA URNS LPF: 0 /LPF
IMM GRANULOCYTES # BLD AUTO: 0.02 K/UL (ref 0–0.04)
IMM GRANULOCYTES NFR BLD AUTO: 0.5 % (ref 0–0.5)
INR PPP: 1 (ref 0.8–1.2)
KETONES UR QL STRIP: NEGATIVE
LDLC SERPL CALC-MCNC: 165.8 MG/DL (ref 63–159)
LEUKOCYTE ESTERASE UR QL STRIP: ABNORMAL
LYMPHOCYTES # BLD AUTO: 1.5 K/UL (ref 1–4.8)
LYMPHOCYTES NFR BLD: 40.7 % (ref 18–48)
MCH RBC QN AUTO: 28.1 PG (ref 27–31)
MCHC RBC AUTO-ENTMCNC: 32.2 G/DL (ref 32–36)
MCV RBC AUTO: 87 FL (ref 82–98)
MICROSCOPIC COMMENT: ABNORMAL
MONOCYTES # BLD AUTO: 0.4 K/UL (ref 0.3–1)
MONOCYTES NFR BLD: 9.9 % (ref 4–15)
NEUTROPHILS # BLD AUTO: 1.7 K/UL (ref 1.8–7.7)
NEUTROPHILS NFR BLD: 47.6 % (ref 38–73)
NITRITE UR QL STRIP: NEGATIVE
NONHDLC SERPL-MCNC: 208 MG/DL
NRBC BLD-RTO: 0 /100 WBC
PH UR STRIP: 5 [PH] (ref 5–8)
PLATELET # BLD AUTO: 199 K/UL (ref 150–350)
PMV BLD AUTO: 10.8 FL (ref 9.2–12.9)
POCT GLUCOSE: 203 MG/DL (ref 70–110)
POTASSIUM SERPL-SCNC: 5 MMOL/L (ref 3.5–5.1)
PROT SERPL-MCNC: 9.6 G/DL (ref 6–8.4)
PROT UR QL STRIP: ABNORMAL
PROTHROMBIN TIME: 10.7 SEC (ref 9–12.5)
RBC # BLD AUTO: 4.17 M/UL (ref 4–5.4)
RBC #/AREA URNS HPF: 3 /HPF (ref 0–4)
SODIUM SERPL-SCNC: 138 MMOL/L (ref 136–145)
SP GR UR STRIP: 1.02 (ref 1–1.03)
SQUAMOUS #/AREA URNS HPF: 5 /HPF
TRIGL SERPL-MCNC: 211 MG/DL (ref 30–150)
TROPONIN I SERPL DL<=0.01 NG/ML-MCNC: 0.01 NG/ML (ref 0–0.03)
TSH SERPL DL<=0.005 MIU/L-ACNC: 0.64 UIU/ML (ref 0.4–4)
URN SPEC COLLECT METH UR: ABNORMAL
UROBILINOGEN UR STRIP-ACNC: NEGATIVE EU/DL
WBC # BLD AUTO: 3.64 K/UL (ref 3.9–12.7)
WBC #/AREA URNS HPF: 10 /HPF (ref 0–5)

## 2019-10-28 PROCEDURE — 21400001 HC TELEMETRY ROOM

## 2019-10-28 PROCEDURE — 84484 ASSAY OF TROPONIN QUANT: CPT

## 2019-10-28 PROCEDURE — 93010 EKG 12-LEAD: ICD-10-PCS | Mod: ,,, | Performed by: INTERNAL MEDICINE

## 2019-10-28 PROCEDURE — 93005 ELECTROCARDIOGRAM TRACING: CPT

## 2019-10-28 PROCEDURE — 12000002 HC ACUTE/MED SURGE SEMI-PRIVATE ROOM

## 2019-10-28 PROCEDURE — 81000 URINALYSIS NONAUTO W/SCOPE: CPT

## 2019-10-28 PROCEDURE — 93010 ELECTROCARDIOGRAM REPORT: CPT | Mod: ,,, | Performed by: INTERNAL MEDICINE

## 2019-10-28 PROCEDURE — 80061 LIPID PANEL: CPT

## 2019-10-28 PROCEDURE — 80053 COMPREHEN METABOLIC PANEL: CPT

## 2019-10-28 PROCEDURE — 85610 PROTHROMBIN TIME: CPT

## 2019-10-28 PROCEDURE — 85025 COMPLETE CBC W/AUTO DIFF WBC: CPT

## 2019-10-28 PROCEDURE — 84481 FREE ASSAY (FT-3): CPT

## 2019-10-28 PROCEDURE — 99285 EMERGENCY DEPT VISIT HI MDM: CPT | Mod: 25

## 2019-10-28 PROCEDURE — 82962 GLUCOSE BLOOD TEST: CPT

## 2019-10-28 PROCEDURE — 84439 ASSAY OF FREE THYROXINE: CPT

## 2019-10-28 PROCEDURE — 84443 ASSAY THYROID STIM HORMONE: CPT

## 2019-10-28 PROCEDURE — 85730 THROMBOPLASTIN TIME PARTIAL: CPT

## 2019-10-28 RX ORDER — GLIPIZIDE 5 MG/1
5 TABLET ORAL
Status: ON HOLD | COMMUNITY
End: 2020-06-29 | Stop reason: HOSPADM

## 2019-10-28 RX ORDER — LISINOPRIL AND HYDROCHLOROTHIAZIDE 20; 25 MG/1; MG/1
1 TABLET ORAL DAILY
Status: ON HOLD | COMMUNITY
End: 2020-06-29 | Stop reason: HOSPADM

## 2019-10-28 NOTE — ED PROVIDER NOTES
"Encounter Date: 10/28/2019    SCRIBE #1 NOTE: I, Ave [place], am scribing for, and in the presence of, Alan Ramirez MD.       History     Chief Complaint   Patient presents with    Aphasia     " Her speech is slurred and she seems different, she has had strokes in the past".  Last normal 10/27/19 1230am.     69-year-old female with PMHx of Sjogren's disease, stroke, depression, DM, and fibromyalgia presents to ED with complaints of aphasia that began Saturday at 12:30AM status post fall with head trauma. Associated symptoms include slurred speech, trouble swallowing, and mild drooling. Daughter state's patient's behavior is abnormal compared to baseline. Patient was seen in ED on Sunday and was discharged with no remarkable findings. Patient denies Hx of CABG or stent placement. No other symptoms or findings noted.       The history is provided by the patient and a relative. No  was used.     Review of patient's allergies indicates:  No Known Allergies  Past Medical History:   Diagnosis Date    Arthritis     Depression     Diabetes mellitus     Fibromyalgia     Hypertension     Stroke      Past Surgical History:   Procedure Laterality Date    BACK SURGERY      2 x for ruptured disc    KNEE SURGERY      right knee-meniscus     Family History   Problem Relation Age of Onset    Diabetes Mother     Diabetes Sister      Social History     Tobacco Use    Smoking status: Current Every Day Smoker     Packs/day: 1.00     Years: 46.00     Pack years: 46.00     Types: Cigarettes    Smokeless tobacco: Never Used   Substance Use Topics    Alcohol use: Yes     Comment: socially    Drug use: Yes     Types: Marijuana     Comment: weekly     Review of Systems   Constitutional: Negative for chills and fever.   HENT: Positive for trouble swallowing. Negative for congestion.         (+) drooling   Eyes: Negative for discharge.   Respiratory: Negative for shortness of breath.  "   Cardiovascular: Negative for chest pain.   Gastrointestinal: Negative for abdominal pain.   Genitourinary: Negative for difficulty urinating.   Musculoskeletal: Negative for back pain.   Skin: Negative for wound.   Neurological: Positive for speech difficulty. Negative for headaches.   Psychiatric/Behavioral: Positive for behavioral problems. Negative for confusion.       Physical Exam     Initial Vitals   BP Pulse Resp Temp SpO2   10/28/19 1748 10/28/19 1748 10/28/19 1748 10/28/19 1748 10/28/19 1901   128/79 78 18 98.4 °F (36.9 °C) 100 %      MAP       --                Physical Exam    Nursing note and vitals reviewed.  Constitutional: She appears well-developed and well-nourished. She is not diaphoretic. No distress.   HENT:   Head: Normocephalic and atraumatic.   Nose: Nose normal.   Mouth/Throat: Oropharynx is clear and moist.   Eyes: Conjunctivae and EOM are normal. Pupils are equal, round, and reactive to light. Right eye exhibits no discharge. Left eye exhibits no discharge.   Neck: Normal range of motion. Neck supple. No thyromegaly present.   Cardiovascular: Normal rate, regular rhythm and normal heart sounds.   No murmur heard.  Pulmonary/Chest: Breath sounds normal. No stridor. No respiratory distress. She has no wheezes. She has no rhonchi. She has no rales. She exhibits no tenderness.   Abdominal: Soft. She exhibits no distension and no mass. There is no tenderness. There is no rebound and no guarding.   Musculoskeletal: Normal range of motion. She exhibits no edema or tenderness.   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    Neurological: She is alert and oriented to person, place, and time. She has normal strength. No cranial nerve deficit.   Skin: Skin is warm and dry. No rash noted. No erythema.   Psychiatric: She has a normal mood and affect. Her behavior is normal. Judgment and thought content normal.         ED Course   Procedures  Labs Reviewed   CBC W/ AUTO DIFFERENTIAL - Abnormal;  Notable for the following components:       Result Value    WBC 3.64 (*)     Hemoglobin 11.7 (*)     Hematocrit 36.3 (*)     Gran # (ANC) 1.7 (*)     All other components within normal limits   COMPREHENSIVE METABOLIC PANEL - Abnormal; Notable for the following components:    CO2 20 (*)     Glucose 210 (*)     BUN, Bld 25 (*)     Total Protein 9.6 (*)     eGFR if  49 (*)     eGFR if non  42 (*)     All other components within normal limits   LIPID PANEL - Abnormal; Notable for the following components:    Cholesterol 244 (*)     Triglycerides 211 (*)     HDL 36 (*)     LDL Cholesterol 165.8 (*)     Hdl/Cholesterol Ratio 14.8 (*)     Total Cholesterol/HDL Ratio 6.8 (*)     All other components within normal limits   URINALYSIS, REFLEX TO URINE CULTURE - Abnormal; Notable for the following components:    Protein, UA 2+ (*)     Leukocytes, UA Trace (*)     All other components within normal limits    Narrative:     Preferred Collection Type->Urine, Catheterized   URINALYSIS MICROSCOPIC - Abnormal; Notable for the following components:    WBC, UA 10 (*)     Bacteria Few (*)     All other components within normal limits    Narrative:     Preferred Collection Type->Urine, Catheterized   POCT GLUCOSE - Abnormal; Notable for the following components:    POCT Glucose 203 (*)     All other components within normal limits   PROTIME-INR   TSH   TROPONIN I   APTT   POCT GLUCOSE, HAND-HELD DEVICE     EKG Readings: (Independently Interpreted)   Initial Reading: No STEMI. Rhythm: Sinus Arrhythmia. Heart Rate: 72 bpm.       Imaging Results           MRI Brain Without Contrast (Final result)  Result time 10/28/19 22:05:59    Final result by Cosme Perkins MD (10/28/19 22:05:59)                 Impression:      Acute infarction in the right ruth ann-kyree.    Changes of chronic small vessel ischemic disease.    Old infarction in the left parietal and occipital lobes.    This report was flagged in Epic as  abnormal.      Electronically signed by: Cosme Perkins MD  Date:    10/28/2019  Time:    22:05             Narrative:    EXAMINATION:  MRI BRAIN WITHOUT CONTRAST    CLINICAL HISTORY:  Stroke;Pt's daughter reports slurred speech and drooling with CT raising concern for a possible pontine stroke;    TECHNIQUE:  Multiplanar multisequence MR imaging of the brain was performed without contrast.    COMPARISON:  CT scan of the head dated 10/28/2019.    FINDINGS:  The craniocervical junction is within normal limits.  The intracranial flow voids are within normal limits.  There is an acute infarction in the right ruth ann kyree.  There are extensive T2/FLAIR signal hyperintense signal within the periventricular and subcortical white matter.  There is an old infarction in the left parietal and occipital lobes.  There are no extra-axial fluid collections.  There is no evidence of intracranial hemorrhage.  There is stable appearance of susceptibility weighted artifact within the left posterior thalamus.  There is no evidence of mass effect.    The right orbit is unremarkable.  There are postoperative changes in the left orbit.  The paranasal sinuses and mastoid air cells are clear.                               X-Ray Chest AP Portable (Final result)  Result time 10/28/19 19:56:30    Final result by Shantell Brasher MD (10/28/19 19:56:30)                 Impression:      No acute cardiopulmonary process identified.      Electronically signed by: Shantell Brasher MD  Date:    10/28/2019  Time:    19:56             Narrative:    EXAMINATION:  XR CHEST AP PORTABLE    CLINICAL HISTORY:  Stroke;    TECHNIQUE:  Single frontal view of the chest was performed.    COMPARISON:  April 2017.    FINDINGS:  Cardiac silhouette is normal in size.  Lungs are symmetrically expanded.  Mild coarse interstitial lung changes are seen.  No evidence of focal consolidative process, pneumothorax, or significant effusion.  No acute osseous abnormality  identified.                               CT Head Without Contrast (Final result)  Result time 10/28/19 19:03:51    Final result by Cosme Perkins MD (10/28/19 19:03:51)                 Impression:      Age-indeterminate lacunar type infarctions involving the kyree.  MRI of the brain may be obtained for further evaluation.    Old infarction in the left parietal and occipital lobes.    Stable parenchymal calcifications.      Electronically signed by: Cosme Perkins MD  Date:    10/28/2019  Time:    19:03             Narrative:    EXAMINATION:  CT HEAD WITHOUT CONTRAST    CLINICAL HISTORY:  slurred speech;    TECHNIQUE:  Low dose axial images were obtained through the head.  Coronal and sagittal reformations were also performed. Contrast was not administered.    COMPARISON:  CT scan of the head dated 10/25/2019.    FINDINGS:  The subcutaneous tissues are unremarkable.  The bony calvarium is intact.  The paranasal sinuses are unremarkable.  The mastoid air cells are clear.  The right orbit is unremarkable.  There are postoperative changes in the left orbit.    The craniocervical junction is within normal limits.  The ventricles and sulci are prominent, consistent with cerebral volume loss.  There is unchanged appearance of calcification involving the posterolateral aspect of the left thalamus.  There also calcifications within the globus pallidus.  There is an old infarction in the left posterior parietal and occipital lobe.  There are two areas of age indeterminate lacunar type infarctions in the kyree.  The gray-white differentiation appears grossly intact.                                 Medical Decision Making:   Clinical Tests:   Lab Tests: Ordered and Reviewed  Radiological Study: Ordered and Reviewed  Medical Tests: Ordered and Reviewed            Scribe Attestation:   Scribe #1: I performed the above scribed service and the documentation accurately describes the services I performed. I attest to the accuracy of the  note.      Pt arrived alert, afebrile, non-toxic in appearance, in no acute respiratory distress with VSS.  CN's appeared intact on exam with F2N intact as well.  However, Pt's family endorsed the patient's speech was occasionally slurring and the patient had also been having more difficulty ambulating with her walker.  Thus, CT was performed followed by MRI confirming an acute infarct.  Labs returned unremarkable.  Pt discussed with Dr. Myrick and admitted for further evaluation and management.    Alan Ramirez MD                   Clinical Impression:       ICD-10-CM ICD-9-CM   1. Stroke I63.9 434.91   2. Cerebrovascular accident (CVA), unspecified mechanism I63.9 434.91         Disposition:   Disposition: Admitted  Condition: Stable                  I, Alan Ramirez, personally performed the services described in this documentation. All medical record entries made by the scribe were at my direction and in my presence.  I have reviewed the chart and agree that the record reflects my personal performance and is accurate and complete.         Alan Ramirez MD  10/29/19 0041

## 2019-10-28 NOTE — ED TRIAGE NOTES
Saturday daughter found her on the floor and she was unable to speak. Daughter called EMS but wstates she was back to baseline by the time they arrived. She refused to come to ED. Daughter states she doesn't feel that her speech is completely back to normal. States she has coughed while eating for a while but it's getting worse. Placed on monitor.

## 2019-10-28 NOTE — ED NOTES
Was able to draw purple & green top but no more blood. Notified lab of need to send  to obtain a blue top

## 2019-10-29 PROBLEM — Z72.0 TOBACCO ABUSE: Status: ACTIVE | Noted: 2019-10-29

## 2019-10-29 PROBLEM — I63.9 ACUTE CEREBRAL INFARCTION: Status: ACTIVE | Noted: 2019-10-29

## 2019-10-29 LAB
AORTIC ROOT ANNULUS: 2.73 CM
AORTIC VALVE CUSP SEPERATION: 2.07 CM
ASCENDING AORTA: 2.96 CM
AV INDEX (PROSTH): 0.71
AV MEAN GRADIENT: 3 MMHG
AV PEAK GRADIENT: 5 MMHG
AV VALVE AREA: 1.77 CM2
AV VELOCITY RATIO: 0.76
BSA FOR ECHO PROCEDURE: 1.75 M2
CV ECHO LV RWT: 0.92 CM
DOP CALC AO PEAK VEL: 1.07 M/S
DOP CALC AO VTI: 23.27 CM
DOP CALC LVOT AREA: 2.5 CM2
DOP CALC LVOT DIAMETER: 1.78 CM
DOP CALC LVOT PEAK VEL: 0.81 M/S
DOP CALC LVOT STROKE VOLUME: 41.29 CM3
DOP CALCLVOT PEAK VEL VTI: 16.6 CM
E WAVE DECELERATION TIME: 334.83 MSEC
E/A RATIO: 1.29
E/E' RATIO: 11.69 M/S
ECHO LV POSTERIOR WALL: 1.23 CM (ref 0.6–1.1)
ESTIMATED AVG GLUCOSE: 174 MG/DL (ref 68–131)
FOLATE SERPL-MCNC: 12.2 NG/ML (ref 4–24)
FRACTIONAL SHORTENING: 39 % (ref 28–44)
HBA1C MFR BLD HPLC: 7.7 % (ref 4–5.6)
INTERVENTRICULAR SEPTUM: 1.19 CM (ref 0.6–1.1)
IVRT: 0.17 MSEC
LA MAJOR: 4.88 CM
LA MINOR: 4.74 CM
LA WIDTH: 3.41 CM
LEFT ATRIUM SIZE: 2.85 CM
LEFT ATRIUM VOLUME INDEX: 22.2 ML/M2
LEFT ATRIUM VOLUME: 39.73 CM3
LEFT INTERNAL DIMENSION IN SYSTOLE: 1.64 CM (ref 2.1–4)
LEFT VENTRICLE DIASTOLIC VOLUME INDEX: 14.67 ML/M2
LEFT VENTRICLE DIASTOLIC VOLUME: 26.27 ML
LEFT VENTRICLE MASS INDEX: 53 G/M2
LEFT VENTRICLE SYSTOLIC VOLUME INDEX: 4.2 ML/M2
LEFT VENTRICLE SYSTOLIC VOLUME: 7.6 ML
LEFT VENTRICULAR INTERNAL DIMENSION IN DIASTOLE: 2.67 CM (ref 3.5–6)
LEFT VENTRICULAR MASS: 94.48 G
LV LATERAL E/E' RATIO: 10.86 M/S
LV SEPTAL E/E' RATIO: 12.67 M/S
MV PEAK A VEL: 0.59 M/S
MV PEAK E VEL: 0.76 M/S
PISA TR MAX VEL: 2.76 M/S
POCT GLUCOSE: 136 MG/DL (ref 70–110)
POCT GLUCOSE: 155 MG/DL (ref 70–110)
POCT GLUCOSE: 271 MG/DL (ref 70–110)
PREALB SERPL-MCNC: 24 MG/DL (ref 20–43)
PULM VEIN S/D RATIO: 1.55
PV PEAK D VEL: 0.38 M/S
PV PEAK S VEL: 0.59 M/S
PV PEAK VELOCITY: 1.01 CM/S
RA MAJOR: 5.21 CM
RA PRESSURE: 8 MMHG
RA WIDTH: 2.3 CM
RIGHT VENTRICULAR END-DIASTOLIC DIMENSION: 3.03 CM
RV TISSUE DOPPLER FREE WALL SYSTOLIC VELOCITY 1 (APICAL 4 CHAMBER VIEW): 10.56 CM/S
SINUS: 3.05 CM
STJ: 2.2 CM
T3FREE SERPL-MCNC: 2.4 PG/ML (ref 2.3–4.2)
T4 FREE SERPL-MCNC: 0.97 NG/DL (ref 0.71–1.51)
TDI LATERAL: 0.07 M/S
TDI SEPTAL: 0.06 M/S
TDI: 0.07 M/S
TR MAX PG: 30 MMHG
TRICUSPID ANNULAR PLANE SYSTOLIC EXCURSION: 1.81 CM
TV REST PULMONARY ARTERY PRESSURE: 38 MMHG
VIT B12 SERPL-MCNC: 362 PG/ML (ref 210–950)

## 2019-10-29 PROCEDURE — 92610 EVALUATE SWALLOWING FUNCTION: CPT

## 2019-10-29 PROCEDURE — 82607 VITAMIN B-12: CPT

## 2019-10-29 PROCEDURE — 63600175 PHARM REV CODE 636 W HCPCS: Performed by: HOSPITALIST

## 2019-10-29 PROCEDURE — 99222 1ST HOSP IP/OBS MODERATE 55: CPT | Mod: ,,, | Performed by: PSYCHIATRY & NEUROLOGY

## 2019-10-29 PROCEDURE — 94799 UNLISTED PULMONARY SVC/PX: CPT

## 2019-10-29 PROCEDURE — 82746 ASSAY OF FOLIC ACID SERUM: CPT

## 2019-10-29 PROCEDURE — 94761 N-INVAS EAR/PLS OXIMETRY MLT: CPT

## 2019-10-29 PROCEDURE — 97165 OT EVAL LOW COMPLEX 30 MIN: CPT

## 2019-10-29 PROCEDURE — 25000003 PHARM REV CODE 250: Performed by: HOSPITALIST

## 2019-10-29 PROCEDURE — 99900035 HC TECH TIME PER 15 MIN (STAT)

## 2019-10-29 PROCEDURE — 84425 ASSAY OF VITAMIN B-1: CPT

## 2019-10-29 PROCEDURE — 36415 COLL VENOUS BLD VENIPUNCTURE: CPT

## 2019-10-29 PROCEDURE — 97535 SELF CARE MNGMENT TRAINING: CPT

## 2019-10-29 PROCEDURE — 99222 PR INITIAL HOSPITAL CARE,LEVL II: ICD-10-PCS | Mod: ,,, | Performed by: PSYCHIATRY & NEUROLOGY

## 2019-10-29 PROCEDURE — 97161 PT EVAL LOW COMPLEX 20 MIN: CPT

## 2019-10-29 PROCEDURE — 21400001 HC TELEMETRY ROOM

## 2019-10-29 PROCEDURE — 84134 ASSAY OF PREALBUMIN: CPT

## 2019-10-29 PROCEDURE — 83036 HEMOGLOBIN GLYCOSYLATED A1C: CPT

## 2019-10-29 RX ORDER — TRAMADOL HYDROCHLORIDE 50 MG/1
50 TABLET ORAL EVERY 6 HOURS PRN
Status: DISCONTINUED | OUTPATIENT
Start: 2019-10-29 | End: 2019-10-31 | Stop reason: HOSPADM

## 2019-10-29 RX ORDER — AMOXICILLIN 250 MG
1 CAPSULE ORAL 2 TIMES DAILY PRN
Status: DISCONTINUED | OUTPATIENT
Start: 2019-10-29 | End: 2019-10-31 | Stop reason: HOSPADM

## 2019-10-29 RX ORDER — IBUPROFEN 200 MG
24 TABLET ORAL
Status: DISCONTINUED | OUTPATIENT
Start: 2019-10-29 | End: 2019-10-31 | Stop reason: HOSPADM

## 2019-10-29 RX ORDER — ASPIRIN 325 MG
325 TABLET, DELAYED RELEASE (ENTERIC COATED) ORAL DAILY
Status: DISCONTINUED | OUTPATIENT
Start: 2019-10-29 | End: 2019-10-31 | Stop reason: HOSPADM

## 2019-10-29 RX ORDER — GLUCAGON 1 MG
1 KIT INJECTION
Status: DISCONTINUED | OUTPATIENT
Start: 2019-10-29 | End: 2019-10-31 | Stop reason: HOSPADM

## 2019-10-29 RX ORDER — IBUPROFEN 200 MG
16 TABLET ORAL
Status: DISCONTINUED | OUTPATIENT
Start: 2019-10-29 | End: 2019-10-31 | Stop reason: HOSPADM

## 2019-10-29 RX ORDER — ATORVASTATIN CALCIUM 40 MG/1
40 TABLET, FILM COATED ORAL NIGHTLY
Status: DISCONTINUED | OUTPATIENT
Start: 2019-10-29 | End: 2019-10-31 | Stop reason: HOSPADM

## 2019-10-29 RX ORDER — QUETIAPINE FUMARATE 25 MG/1
25 TABLET, FILM COATED ORAL NIGHTLY PRN
Status: ON HOLD | COMMUNITY
End: 2020-06-29 | Stop reason: HOSPADM

## 2019-10-29 RX ORDER — SODIUM CHLORIDE 0.9 % (FLUSH) 0.9 %
10 SYRINGE (ML) INJECTION
Status: DISCONTINUED | OUTPATIENT
Start: 2019-10-29 | End: 2019-10-31 | Stop reason: HOSPADM

## 2019-10-29 RX ORDER — ENOXAPARIN SODIUM 100 MG/ML
40 INJECTION SUBCUTANEOUS EVERY 24 HOURS
Status: DISCONTINUED | OUTPATIENT
Start: 2019-10-29 | End: 2019-10-31 | Stop reason: HOSPADM

## 2019-10-29 RX ORDER — ACETAMINOPHEN 325 MG/1
650 TABLET ORAL EVERY 8 HOURS PRN
Status: DISCONTINUED | OUTPATIENT
Start: 2019-10-29 | End: 2019-10-31 | Stop reason: HOSPADM

## 2019-10-29 RX ORDER — INSULIN ASPART 100 [IU]/ML
0-5 INJECTION, SOLUTION INTRAVENOUS; SUBCUTANEOUS
Status: DISCONTINUED | OUTPATIENT
Start: 2019-10-29 | End: 2019-10-31 | Stop reason: HOSPADM

## 2019-10-29 RX ORDER — ONDANSETRON 2 MG/ML
8 INJECTION INTRAMUSCULAR; INTRAVENOUS EVERY 8 HOURS PRN
Status: DISCONTINUED | OUTPATIENT
Start: 2019-10-29 | End: 2019-10-31 | Stop reason: HOSPADM

## 2019-10-29 RX ORDER — QUETIAPINE FUMARATE 25 MG/1
25 TABLET, FILM COATED ORAL NIGHTLY PRN
Status: DISCONTINUED | OUTPATIENT
Start: 2019-10-29 | End: 2019-10-31 | Stop reason: HOSPADM

## 2019-10-29 RX ORDER — METOPROLOL TARTRATE 1 MG/ML
5 INJECTION, SOLUTION INTRAVENOUS EVERY 5 MIN PRN
Status: DISCONTINUED | OUTPATIENT
Start: 2019-10-29 | End: 2019-10-31 | Stop reason: HOSPADM

## 2019-10-29 RX ADMIN — ATORVASTATIN CALCIUM 40 MG: 40 TABLET, FILM COATED ORAL at 01:10

## 2019-10-29 RX ADMIN — ATORVASTATIN CALCIUM 40 MG: 40 TABLET, FILM COATED ORAL at 09:10

## 2019-10-29 RX ADMIN — ENOXAPARIN SODIUM 40 MG: 100 INJECTION SUBCUTANEOUS at 05:10

## 2019-10-29 RX ADMIN — QUETIAPINE FUMARATE 25 MG: 25 TABLET ORAL at 09:10

## 2019-10-29 RX ADMIN — ASPIRIN 325 MG: 325 TABLET, DELAYED RELEASE ORAL at 01:10

## 2019-10-29 NOTE — ASSESSMENT & PLAN NOTE
In the emergency department routine laboratory studies and head CT was performed.  There is questionable at evidence of an age-indeterminate infarction.  MRI was performed which did confirm an acute infarction of the right ruth ann-kyree.  Patient well outside the window for tPA.  Stroke protocol and management initiated.  Neurology consulted.PT,OT,ST is consulted.  On ASA,statin,  Will have MRA brain and carotid doppler.

## 2019-10-29 NOTE — PLAN OF CARE
Problem: Occupational Therapy Goal  Goal: Occupational Therapy Goal  Outcome: Met     Patient tolerated evaluation well, patient with no need for skilled OT services at this time. Patient has a PCA 7 days a week for 5 hours per day. GARRY Chavez, MS

## 2019-10-29 NOTE — HOSPITAL COURSE
Lucina Villalpando is a 69 y.o. female that (in part)  has a past medical history of Arthritis, Depression, Diabetes mellitus, Fibromyalgia, Hypertension, and Stroke.  has a past surgical history that includes Knee surgery and Back surgery. Presents to [facility] complaining of slurred speech associated with altered mental status.  Last seen normal at approximately 12:30 a.m. on 10/27.  From Saturday the patient daughter found her on the floor and she was unable to speak.  EMS was activated.  When ambulance arrived the patient was asymptomatic and she refused further evaluation in the emergency department.  However over  the weekend the base daughter about she was still experiencing slurred  speech and was having what sounds like aspiration while eating.she come in for further evaluation.  She was experiencing drooling as well.  Remainder of the history is otherwise was limited due to the current condition of the patient as well as her level of cooperation,  In the emergency department routine laboratory studies and head CT was performed.  There is questionable at evidence of an age-indeterminate infarction.  MRI was performed which did confirm an acute infarction of the right ruth ann-kyree.  Patient well outside the window for tPA.  Stroke protocol and management initiated.  Neurology consulted.PT,OT,ST is consulted.  Was started on on Plavix,ASA,statin,  MRA brain and carotid doppler are unremarkable,  initially was on permissive HTN,Started gradually on BP med's.her symptoms improved,was started on diet,ddi well with PT,OT,patient has samaria discharged home with HH,ASA,plavix,statin,instructed quit smoking,will follow up with PCP in next few days.

## 2019-10-29 NOTE — H&P
"[facility] Medicine  History & Physical    Patient Name: Lucina Villalpando  MRN: [id number]  Admission Date: 10/29/2019  Attending Physician: Nitin Myrick MD, MPH      PCP:     Johan Harrison MD    CC:     Chief Complaint   Patient presents with    Aphasia     " Her speech is slurred and she seems different, she has had strokes in the past".  Last normal 10/27/19 1230am.       HISTORY OF PRESENT ILLNESS:     Lucina Villalpando is a 69 y.o. female that (in part)  has a past medical history of Arthritis, Depression, Diabetes mellitus, Fibromyalgia, Hypertension, and Stroke.  has a past surgical history that includes Knee surgery and Back surgery. Presents to [facility] Emergency Department complaining of slurred speech associated with altered mental status.  Last Ativan normal at approximately 12:30 a.m. on 10/27.  From Saturday the patient daughter found her on the floor and she was unable to speak.  EMS was activated.  When ambulance arrived the patient was asymptomatic and she refused further evaluation in the emergency department.  However over rated the weekend the base daughter about she was still experiencing insert speech and was having what sounds like aspiration while eating.  She is cyst and she come in for further evaluation.  She was experiencing drooling as well.  Remainder of the history is otherwise limited due to the current condition of the patient as well as her level of cooperation at this time.    In the emergency department routine laboratory studies and head CT was performed.  There is questionable at evidence of an age-indeterminate infarction.  MRI was performed which did confirm an acute infarction of the right ruth ann-kyree.  Patient well outside the window for tPA.  Stroke protocol and management initiated.  Neurology consulted.    Hospital medicine has been asked to admit for further evaluation and treatment.       REVIEW OF SYSTEMS: "     Unreliable review of systems other than what is  available above in the HPI    PAST MEDICAL / SURGICAL HISTORY:     Past Medical History:   Diagnosis Date    Arthritis     Depression     Diabetes mellitus     Fibromyalgia     Hypertension     Stroke      Past Surgical History:   Procedure Laterality Date    BACK SURGERY      2 x for ruptured disc    KNEE SURGERY      right knee-meniscus         FAMILY HISTORY:     Family History   Problem Relation Age of Onset    Diabetes Mother     Diabetes Sister          SOCIAL HISTORY:     Social History     Socioeconomic History    Marital status: Single     Spouse name: Not on file    Number of children: Not on file    Years of education: Not on file    Highest education level: Not on file   Occupational History    Not on file   Social Needs    Financial resource strain: Not on file    Food insecurity:     Worry: Not on file     Inability: Not on file    Transportation needs:     Medical: Not on file     Non-medical: Not on file   Tobacco Use    Smoking status: Current Every Day Smoker     Packs/day: 1.00     Years: 46.00     Pack years: 46.00     Types: Cigarettes    Smokeless tobacco: Never Used   Substance and Sexual Activity    Alcohol use: Yes     Comment: socially    Drug use: Yes     Types: Marijuana     Comment: weekly    Sexual activity: Not on file   Lifestyle    Physical activity:     Days per week: Not on file     Minutes per session: Not on file    Stress: Not on file   Relationships    Social connections:     Talks on phone: Not on file     Gets together: Not on file     Attends Taoist service: Not on file     Active member of club or organization: Not on file     Attends meetings of clubs or organizations: Not on file     Relationship status: Not on file   Other Topics Concern    Not on file   Social History Narrative    Not on file         ALLERGIES:       Review of patient's allergies indicates:  No Known  Allergies      HEALTH SCREENING:     Influenza vaccine not up-to-date for this season.  Prevnar 13 pneumonia vaccine = no evidence of previous vaccination found in the medical record      HOME MEDICATIONS:     Prior to Admission medications    Medication Sig Start Date End Date Taking? Authorizing Provider   glipiZIDE (GLUCOTROL) 5 MG tablet Take 5 mg by mouth 2 (two) times daily before meals.   Yes Historical Provider, MD   lisinopril-hydrochlorothiazide (PRINZIDE,ZESTORETIC) 20-25 mg Tab Take 1 tablet by mouth once daily.   Yes Historical Provider, MD   amitriptyline (ELAVIL) 50 MG tablet Take 50 mg by mouth every evening.  10/28/19 Yes Historical Provider, MD   cyanocobalamin (VITAMIN B-12) 1000 MCG tablet Take 100 mcg by mouth once daily.    Historical Provider, MD   naproxen (NAPROSYN) 500 MG tablet Take 500 mg by mouth 2 (two) times daily.    Historical Provider, MD   ondansetron (ZOFRAN-ODT) 8 MG TbDL Take 1 tablet (8 mg total) by mouth every 8 (eight) hours as needed (Nausea). 10/6/15   Slim Harris MD   oxycodone (ROXICODONE) 5 MG immediate release tablet Take 1 tablet (5 mg total) by mouth every 6 (six) hours as needed for Pain. 10/6/15   Slim Harris MD   potassium & sodium phosphates (PHOS-NAK) 280-160-250 mg PwPk Take 1 packet by mouth 4 (four) times daily with meals and nightly. 9/24/15   ROXANNE Reis   senna-docusate 8.6-50 mg (PERICOLACE) 8.6-50 mg per tablet Take 1 tablet by mouth 2 (two) times daily as needed for Constipation. 9/18/15   Rae Riley MD   tramadol (ULTRAM) 50 mg tablet Take 50 mg by mouth every 6 (six) hours as needed for Pain.    Historical Provider, MD   metformin (GLUCOPHAGE) 1000 MG tablet Take 1,000 mg by mouth 2 (two) times daily with meals.  10/28/19  Historical Provider, MD          HOSPITAL MEDICATIONS:     Scheduled Meds:   Continuous Infusions:   PRN Meds:       PHYSICAL EXAM:     Wt Readings from Last 1 Encounters:   10/29/19  0017 61.7 kg (136 lb 0.4 oz)   10/28/19 1748 61.2 kg (135 lb)     Body mass index is 18.97 kg/m².  Vitals:    10/28/19 2232 10/28/19 2302 10/28/19 2330 10/29/19 0017   BP: (!) 191/94 (!) 147/67  (!) 169/75   BP Location:    Left arm   Patient Position:    Lying   Pulse: 110 83 81 74   Resp: (!) 22 20 (!) 22 18   Temp:    98.8 °F (37.1 °C)   TempSrc:    Oral   SpO2: 99% 100% 100% 99%   Weight:    61.7 kg (136 lb 0.4 oz)   Height:              -- General appearance: well developed. appears stated age   -- Head: normocephalic, atraumatic   -- Eyes: conjunctivae clear. Extraocular muscles intact  -- Nose: Nares normal. Septum midline.   -- Mouth/Throat: lips, mucosa, and tongue normal. no throat erythema.   -- Neck: supple, symmetrical, trachea midline, no JVD and thyroid not grossly enlarged, appears symmetric  -- Lungs: clear to auscultation bilaterally. normal respiratory effort. No use of accessory muscles.   -- Chest wall: no tenderness. equal bilateral chest rise   -- Heart: regular rate and regular rhythm. S1, S2 normal.  no click, rub or gallop   -- Abdomen: soft, non-tender, non-distended, non-tympanic; bowel sounds normal; no masses  -- Extremities: no cyanosis, clubbing or edema.   -- Pulses: 2+ and symmetric   -- Skin:  Lipoma on upper extremity.  Turgor normal. Color normal. Texture normal. No rashes .  -- Neurologic:  Slurred speech.  Globally decreased muscle strength and tone.  Mild wound.  No focal numbness or weakness. CNII-XII intact. Ruben coma scale: eyes open spontaneously-4, oriented & converses-5, obeys commands-6.      LABORATORY STUDIES:     Recent Results (from the past 36 hour(s))   POCT glucose    Collection Time: 10/28/19  5:52 PM   Result Value Ref Range    POCT Glucose 203 (H) 70 - 110 mg/dL   CBC W/ AUTO DIFFERENTIAL    Collection Time: 10/28/19  6:41 PM   Result Value Ref Range    WBC 3.64 (L) 3.90 - 12.70 K/uL    RBC 4.17 4.00 - 5.40 M/uL    Hemoglobin 11.7 (L) 12.0 - 16.0 g/dL     Hematocrit 36.3 (L) 37.0 - 48.5 %    Mean Corpuscular Volume 87 82 - 98 fL    Mean Corpuscular Hemoglobin 28.1 27.0 - 31.0 pg    Mean Corpuscular Hemoglobin Conc 32.2 32.0 - 36.0 g/dL    RDW 13.2 11.5 - 14.5 %    Platelets 199 150 - 350 K/uL    MPV 10.8 9.2 - 12.9 fL    Immature Granulocytes 0.5 0.0 - 0.5 %    Gran # (ANC) 1.7 (L) 1.8 - 7.7 K/uL    Immature Grans (Abs) 0.02 0.00 - 0.04 K/uL    Lymph # 1.5 1.0 - 4.8 K/uL    Mono # 0.4 0.3 - 1.0 K/uL    Eos # 0.0 0.0 - 0.5 K/uL    Baso # 0.03 0.00 - 0.20 K/uL    nRBC 0 0 /100 WBC    Gran% 47.6 38.0 - 73.0 %    Lymph% 40.7 18.0 - 48.0 %    Mono% 9.9 4.0 - 15.0 %    Eosinophil% 0.5 0.0 - 8.0 %    Basophil% 0.8 0.0 - 1.9 %    Differential Method Automated    Comprehensive metabolic panel    Collection Time: 10/28/19  6:41 PM   Result Value Ref Range    Sodium 138 136 - 145 mmol/L    Potassium 5.0 3.5 - 5.1 mmol/L    Chloride 109 95 - 110 mmol/L    CO2 20 (L) 23 - 29 mmol/L    Glucose 210 (H) 70 - 110 mg/dL    BUN, Bld 25 (H) 8 - 23 mg/dL    Creatinine 1.3 0.5 - 1.4 mg/dL    Calcium 10.5 8.7 - 10.5 mg/dL    Total Protein 9.6 (H) 6.0 - 8.4 g/dL    Albumin 4.0 3.5 - 5.2 g/dL    Total Bilirubin 0.4 0.1 - 1.0 mg/dL    Alkaline Phosphatase 94 55 - 135 U/L    AST 21 10 - 40 U/L    ALT 13 10 - 44 U/L    Anion Gap 9 8 - 16 mmol/L    eGFR if African American 49 (A) >60 mL/min/1.73 m^2    eGFR if non African American 42 (A) >60 mL/min/1.73 m^2   TSH    Collection Time: 10/28/19  6:41 PM   Result Value Ref Range    TSH 0.642 0.400 - 4.000 uIU/mL   LDL - Lipid Panel    Collection Time: 10/28/19  6:41 PM   Result Value Ref Range    Cholesterol 244 (H) 120 - 199 mg/dL    Triglycerides 211 (H) 30 - 150 mg/dL    HDL 36 (L) 40 - 75 mg/dL    LDL Cholesterol 165.8 (H) 63.0 - 159.0 mg/dL    Hdl/Cholesterol Ratio 14.8 (L) 20.0 - 50.0 %    Total Cholesterol/HDL Ratio 6.8 (H) 2.0 - 5.0    Non-HDL Cholesterol 208 mg/dL   Troponin I    Collection Time: 10/28/19  6:41 PM   Result Value Ref Range     Troponin I 0.009 0.000 - 0.026 ng/mL   Urinalysis, Reflex to Urine Culture Urine, Catheterized    Collection Time: 10/28/19  7:19 PM   Result Value Ref Range    Specimen UA Urine, Clean Catch     Color, UA Yellow Yellow, Straw, Michelle    Appearance, UA Clear Clear    pH, UA 5.0 5.0 - 8.0    Specific Gravity, UA 1.025 1.005 - 1.030    Protein, UA 2+ (A) Negative    Glucose, UA Negative Negative    Ketones, UA Negative Negative    Bilirubin (UA) Negative Negative    Occult Blood UA Negative Negative    Nitrite, UA Negative Negative    Urobilinogen, UA Negative <2.0 EU/dL    Leukocytes, UA Trace (A) Negative   Urinalysis Microscopic    Collection Time: 10/28/19  7:19 PM   Result Value Ref Range    RBC, UA 3 0 - 4 /hpf    WBC, UA 10 (H) 0 - 5 /hpf    Bacteria Few (A) None-Occ /hpf    Squam Epithel, UA 5 /hpf    Hyaline Casts, UA 0 0-1/lpf /lpf    Microscopic Comment SEE COMMENT    Protime-INR    Collection Time: 10/28/19  7:27 PM   Result Value Ref Range    Prothrombin Time 10.7 9.0 - 12.5 sec    INR 1.0 0.8 - 1.2   APTT    Collection Time: 10/28/19  7:27 PM   Result Value Ref Range    aPTT 27.2 21.0 - 32.0 sec       Lab Results   Component Value Date    INR 1.0 10/28/2019     Lab Results   Component Value Date    HGBA1C 5.9 09/17/2015     Recent Labs     10/28/19  1752   POCTGLUCOSE 203*           MICROBIOLOGY DATA:     Urine Culture, Routine   Date Value Ref Range Status   04/23/2018 ESCHERICHIA COLI  10,000 - 49,999 cfu/ml    Final   09/02/2017 No significant growth  Final   10/06/2015 ESCHERICHIA COLI  > 100,000 cfu/ml    Final   09/16/2015 ESCHERICHIA COLI  > 100,000 cfu/ml    Final       Microbiology x 7d:   Microbiology Results (last 7 days)     ** No results found for the last 168 hours. **            IMAGING:     Imaging Results           MRI Brain Without Contrast (Final result)  Result time 10/28/19 22:05:59    Final result by Cosme Perkins MD (10/28/19 22:05:59)                 Impression:      Acute  infarction in the right ruth ann-kyree.    Changes of chronic small vessel ischemic disease.    Old infarction in the left parietal and occipital lobes.    This report was flagged in Epic as abnormal.      Electronically signed by: Cosme Perkins MD  Date:    10/28/2019  Time:    22:05             Narrative:    EXAMINATION:  MRI BRAIN WITHOUT CONTRAST    CLINICAL HISTORY:  Stroke;Pt's daughter reports slurred speech and drooling with CT raising concern for a possible pontine stroke;    TECHNIQUE:  Multiplanar multisequence MR imaging of the brain was performed without contrast.    COMPARISON:  CT scan of the head dated 10/28/2019.    FINDINGS:  The craniocervical junction is within normal limits.  The intracranial flow voids are within normal limits.  There is an acute infarction in the right ruth ann kyree.  There are extensive T2/FLAIR signal hyperintense signal within the periventricular and subcortical white matter.  There is an old infarction in the left parietal and occipital lobes.  There are no extra-axial fluid collections.  There is no evidence of intracranial hemorrhage.  There is stable appearance of susceptibility weighted artifact within the left posterior thalamus.  There is no evidence of mass effect.    The right orbit is unremarkable.  There are postoperative changes in the left orbit.  The paranasal sinuses and mastoid air cells are clear.                               X-Ray Chest AP Portable (Final result)  Result time 10/28/19 19:56:30    Final result by Shantell Brasher MD (10/28/19 19:56:30)                 Impression:      No acute cardiopulmonary process identified.      Electronically signed by: Shantell Brasher MD  Date:    10/28/2019  Time:    19:56             Narrative:    EXAMINATION:  XR CHEST AP PORTABLE    CLINICAL HISTORY:  Stroke;    TECHNIQUE:  Single frontal view of the chest was performed.    COMPARISON:  April 2017.    FINDINGS:  Cardiac silhouette is normal in size.  Lungs are  symmetrically expanded.  Mild coarse interstitial lung changes are seen.  No evidence of focal consolidative process, pneumothorax, or significant effusion.  No acute osseous abnormality identified.                               CT Head Without Contrast (Final result)  Result time 10/28/19 19:03:51    Final result by Cosme Perkins MD (10/28/19 19:03:51)                 Impression:      Age-indeterminate lacunar type infarctions involving the kyree.  MRI of the brain may be obtained for further evaluation.    Old infarction in the left parietal and occipital lobes.    Stable parenchymal calcifications.      Electronically signed by: Cosme Perkins MD  Date:    10/28/2019  Time:    19:03             Narrative:    EXAMINATION:  CT HEAD WITHOUT CONTRAST    CLINICAL HISTORY:  slurred speech;    TECHNIQUE:  Low dose axial images were obtained through the head.  Coronal and sagittal reformations were also performed. Contrast was not administered.    COMPARISON:  CT scan of the head dated 10/25/2019.    FINDINGS:  The subcutaneous tissues are unremarkable.  The bony calvarium is intact.  The paranasal sinuses are unremarkable.  The mastoid air cells are clear.  The right orbit is unremarkable.  There are postoperative changes in the left orbit.    The craniocervical junction is within normal limits.  The ventricles and sulci are prominent, consistent with cerebral volume loss.  There is unchanged appearance of calcification involving the posterolateral aspect of the left thalamus.  There also calcifications within the globus pallidus.  There is an old infarction in the left posterior parietal and occipital lobe.  There are two areas of age indeterminate lacunar type infarctions in the kyree.  The gray-white differentiation appears grossly intact.                                  CONSULTS:     IP CONSULT TO NEUROLOGY       ASSESSMENT & PLAN:     Primary Diagnosis:  Acute cerebral infarction    Active Hospital Problems     Diagnosis  POA    *Acute cerebral infarction [I63.9]  Yes     Priority: 1 - High     1. Acute infarction in the right ruth ann-kyree.  Changes of chronic small vessel ischemic disease.  Old infarction in the left parietal and occipital lobes.      Tobacco abuse [Z72.0]  Yes    Type 2 diabetes mellitus with neurologic complication [E11.49]  Yes    H/O: CVA (cerebrovascular accident) [Z86.73]  Not Applicable    Essential hypertension [I10]  Yes      Resolved Hospital Problems   No resolved problems to display.         Acute Cerebral vascular accident of right hemipons   Right brain etiology as evidenced by physical exam and history  · Stroke risks include hypertension, tobacco abuse, diabetes, known CVD with previous stroke  · Symptoms of slurred speech and drooling; status post fall  · Initial Differential diagnosis included CVA, TIA, subarachnoid hemorrhage, complex migraine, seizure, encephalopathy, and/or tumor  · Initiate stroke protocol  · Trend NIH stroke scale  · STAT CT of head performed with equivocal acute findings  · MRI confirmed acute infarction of right hemipons  · Bilateral carotid ultrasound  · 2-D echo with bubble study  · Obtain fasting lipids  · Statin therapy: Atorvastatin- 80 mg oral daily  · TSH, FT3, FT4  · Thiamine (B1)  · RPR  · HgA1c  · B12  · Folate  · Hemoccult  · Give Lovenox, ASA  · Seizures precaution  · Ativan 1 mg PRN Seizures / call neurologist after first dose  · Hold ACEi, beta-blocker, etc while allowing permissive hypertension per stroke protocol  · Stroke education given and patient educated about both stroke prevention and symptoms to be mindful of. The patient was instructed to dial 911 for any signs or symptoms of stroke such as sudden weakness, numbness, dizziness, speech, gait, or visual changes  · Consult neurology for further evaluation and recommendations        Hypertension (Permissive due to CVA)  · Allow for permissive HTN in first 24-48 hours  · Thereafter goal while  inpatient is a systolic blood pressure less than 140mmHg  · BP in acceptable range at this time  · Continue current home regimen with hold parameters  · PRN antihypertensives available      Hyperlipidemia  · Lipid panel - repeat as fasting lipid panel  · Cardiac diet  · Continue statin    Tobacco abuse  · Chronic tobacco use  · Tobacco cessation counseling  · Nicotine patch offered; consider Wellbutrin or Chantix through PCP as an outpatient (will require closer monitoring)  · I discussed with the patient regarding the hazardous effects of smoking on increasing risk of heart attack and stroke, worsening lung functions, and increasing cancer risk.   Patient was urged to stop smoking now.  I also offered nicotine taper (such as nicotine patch and gum) to help ease the craving to smoke.    Diabetes Mellitus Type 2  · Blood glucose is slightly above acceptable range at this time; insulin given  · Maintain w/ subcutaneous insulin management order set  · Hold oral diabetic meds  · ADA 1800 kcal diet  · BG goal while in patient is <180mg/dL  · HgA1c = Pending          VTE Risk Mitigation (From admission, onward)         Ordered     IP VTE LOW RISK PATIENT  Once      10/29/19 0015     Place YULI hose  Until discontinued      10/29/19 0015     Place sequential compression device  Until discontinued      10/29/19 0015                  Adult PRN medications available   DVT prophylaxis given       DISPOSITION:     Will admit to the Hospital Medicine service for further evaluation and treatment.    Chart reviewed and updated where applicable.    High Risk Conditions:  Patient has an abrupt change in neurologic status: CVA      ===============================================================    Nitin Myrick MD, MPH  Department of Hospital Medicine   [facility]  [phone]  (7pm - 6am)          This note is dictated using [employer] voice recognition software.  There are word recognition mistakes that are  occasionally missed on review.  \

## 2019-10-29 NOTE — ED NOTES
Patient ate chicken and rice beans, noticed coughing on eating solid foods.  Instructed not to be NPO until further doctors order. Family aware.

## 2019-10-29 NOTE — PT/OT/SLP EVAL
Physical Therapy Evaluation    Patient Name:  Lucina Villalpando   MRN:  [id number]    Recommendations:     Discharge Recommendations:  home health PT(with family assistance)   Discharge Equipment Recommendations: none   Barriers to discharge: None    Assessment:     Lucina Villalpando is a 69 y.o. female admitted with a medical diagnosis of Acute cerebral infarction.  She presents with the following impairments/functional limitations:  weakness, impaired endurance, impaired functional mobilty, gait instability, impaired balance, decreased lower extremity function, decreased safety awareness.    Rehab Prognosis: Fair+; patient would benefit from acute skilled PT services to address these deficits and reach maximum level of function.    Recent Surgery: * No surgery found *      Plan:     During this hospitalization, patient to be seen 5 x/week to address the identified rehab impairments via gait training, therapeutic exercises, therapeutic activities and progress toward the following goals:    · Plan of Care Expires:  11/12/19    Subjective     Chief Complaint: weakness and lack of sleep  Patient/Family Comments/goals: Pt agreeable to therapy.  Pain/Comfort:  · Pain Rating 1: 0/10    Living Environment:  Pt lives alone on the 1st floor apartment.   Prior to admission, patients level of function was mod I with household ambulation using rollator.  Pt recently used scooter x 2 times with falls per daughter.  Per daughter, pt has a scooter with 3 wheels and should have a scooter with 4 wheels for better stability.  Pt does not drive a vehicle.  Equipment used at home: rollator, power chair, bedside commode, bath bench.  Upon discharge, patient will have assistance from PCA services 5 hr/day 7 day/wk.  Daughter provides assistance PRN, mostly on the weekend.      Objective:     Patient found seated EOB with ST [facility] and daughter present in the room with peripheral IV, telemetry  upon PT entry to room.    General  Precautions: Standard, fall   Orthopedic Precautions:N/A   Braces: N/A     Exams:  · Cognitive Exam:  Patient is oriented to Person, Place and Time.  Pt able to follow 2 step commands.  Per daughter, pt with no memory lost concerns however pt does take a little more time to process things.    · Gross Motor Coordination:  WFL  · Postural Exam:  Patient presented with the following abnormalities:    · -       Trunk flexion  · Sensation:    · -       Intact  light/touch BLE  · Skin Integrity/Edema:      · -       Skin integrity: Visible skin intact  · -       Edema: None noted BLE  · BLE ROM: WNL  · BLE Strength: 4/5    Functional Mobility:  Pt pleasant and cooperative with therapy, just tired from lack of sleep.    · Transfers:     · Sit to Stand:  contact guard assistance with 4 wheeled walker  · Bed to Chair: contact guard assistance with  4 wheeled walker  using  Step Transfer  · Gait: Pt ambulated 80 ft with CGA using rollator.  Pt with slight forward flexed posture, decreased step length, and decreased raj.    · Balance: Pt with fair dynamic standing balance.       Therapeutic Activities and Exercises:  Pt/daughter educated on being OOB>chair for meals and to ambulate to the bathroom with nursing assistance while in the hospital.  Pt re-oriented to call light and encouraged to call for assistance with OOB activities.  Pt/daughter verbalized good understanding.     AM-PAC 6 CLICK MOBILITY  Total Score:21     Patient left up in chair reclined with all lines intact, call button in reach, nurse Kaia notified and daughter present.    GOALS:   Multidisciplinary Problems     Physical Therapy Goals        Problem: Physical Therapy Goal    Goal Priority Disciplines Outcome Goal Variances Interventions   Physical Therapy Goal     PT, PT/OT Ongoing, Progressing     Description:  Goals to be met by: 19     Patient will increase functional independence with mobility by performin. Supine to sit with Modified  [place]  2. Rolling to Left and Right with Modified [place]  3. Sit to stand transfer with Modified [place]  4. Bed to chair transfer with Modified [place]   5. Gait 200 feet with Modified [place] using Rollator   6. Upper/Lower extremity exercise program 3 sets x10 reps per handout, with independence                      History:     Past Medical History:   Diagnosis Date    Arthritis     Depression     Diabetes mellitus     Fibromyalgia     Hypertension     Stroke        Past Surgical History:   Procedure Laterality Date    BACK SURGERY      2 x for ruptured disc    KNEE SURGERY      right knee-meniscus       Time Tracking:     PT Received On: 10/29/19  PT Start Time: 1427     PT Stop Time: 1449  PT Total Time (min): 22 min     Billable Minutes: Evaluation 22 min       Arlen Dozier, PT  10/29/2019

## 2019-10-29 NOTE — PLAN OF CARE
Problem: Physical Therapy Goal  Goal: Physical Therapy Goal  Description  Goals to be met by: 19     Patient will increase functional independence with mobility by performin. Supine to sit with Modified [place]  2. Rolling to Left and Right with Modified [place]  3. Sit to stand transfer with Modified [place]  4. Bed to chair transfer with Modified [place]   5. Gait 200 feet with Modified [place] using Rollator   6. Upper/Lower extremity exercise program 3 sets x10 reps per handout, with independence     Outcome: Ongoing, Progressing    Pt ambulated 80 ft with CGA using rollator.

## 2019-10-29 NOTE — NURSING
Patient going to scheduled cardiology procedure as ordered. Patient is Awake and alert without any discomfort. Via wheelchair

## 2019-10-29 NOTE — PROGRESS NOTES
[facility] Hospital Medicine  Progress Note    Patient Name: Lucina Villalpando  MRN: [id number]  Patient Class: IP- Inpatient   Admission Date: 10/28/2019  Length of Stay: 1 days  Attending Physician: Emily Page MD  Primary Care Provider: Johan Harrison MD        Subjective:     Principal Problem:Acute cerebral infarction        HPI:    Lucina Villalpando is a 69 y.o. female that (in part)  has a past medical history of Arthritis, Depression, Diabetes mellitus, Fibromyalgia, Hypertension, and Stroke.  has a past surgical history that includes Knee surgery and Back surgery. Presents to [facility] Emergency Department complaining of slurred speech associated with altered mental status.  Last Ativan normal at approximately 12:30 a.m. on 10/27.  From Saturday the patient daughter found her on the floor and she was unable to speak.  EMS was activated.  When ambulance arrived the patient was asymptomatic and she refused further evaluation in the emergency department.  However over rated the weekend the base daughter about she was still experiencing insert speech and was having what sounds like aspiration while eating.  She is cyst and she come in for further evaluation.  She was experiencing drooling as well.  Remainder of the history is otherwise limited due to the current condition of the patient as well as her level of cooperation at this time.    In the emergency department routine laboratory studies and head CT was performed.  There is questionable at evidence of an age-indeterminate infarction.  MRI was performed which did confirm an acute infarction of the right ruth ann-kyree.  Patient well outside the window for tPA.  Stroke protocol and management initiated.  Neurology consulted.    Hospital medicine has been asked to admit for further evaluation and treatment.     Overview/Hospital Course:    Lucina Villalpando is a 69 y.o. female that (in part)  has a past medical  history of Arthritis, Depression, Diabetes mellitus, Fibromyalgia, Hypertension, and Stroke.  has a past surgical history that includes Knee surgery and Back surgery. Presents to [facility] Emergency Department complaining of slurred speech associated with altered mental status.  Last Ativan normal at approximately 12:30 a.m. on 10/27.  From Saturday the patient daughter found her on the floor and she was unable to speak.  EMS was activated.  When ambulance arrived the patient was asymptomatic and she refused further evaluation in the emergency department.  However over rated the weekend the base daughter about she was still experiencing slurred  speech and was having what sounds like aspiration while eating.  She is cyst and she come in for further evaluation.  She was experiencing drooling as well.  Remainder of the history is otherwise limited due to the current condition of the patient as well as her level of cooperation at this time.  In the emergency department routine laboratory studies and head CT was performed.  There is questionable at evidence of an age-indeterminate infarction.  MRI was performed which did confirm an acute infarction of the right ruth ann-kyree.  Patient well outside the window for tPA.  Stroke protocol and management initiated.  Neurology consulted.PT,OT,ST is consulted.  On ASA,statin,  Will have MRA brain and carotid doppler.        Interval History: patient is alert.    Review of Systems   Constitutional: Positive for activity change, appetite change and fatigue.   HENT: Negative for congestion.    Eyes: Negative for discharge and itching.   Respiratory: Negative for apnea and chest tightness.    Cardiovascular: Negative for chest pain and leg swelling.   Gastrointestinal: Negative for abdominal distention.   Endocrine: Negative for heat intolerance.   Neurological: Positive for speech difficulty and weakness.     Objective:     Vital Signs (Most Recent):  Temp: 98  °F (36.7 °C) (10/29/19 0745)  Pulse: 64 (10/29/19 0745)  Resp: 18 (10/29/19 0745)  BP: (!) 151/72 (10/29/19 0745)  SpO2: 99 % (10/29/19 0745) Vital Signs (24h Range):  Temp:  [98 °F (36.7 °C)-99 °F (37.2 °C)] 98 °F (36.7 °C)  Pulse:  [] 64  Resp:  [16-24] 18  SpO2:  [98 %-100 %] 99 %  BP: (128-191)/(65-98) 151/72     Weight: 61.7 kg (136 lb 0.4 oz)  Body mass index is 18.97 kg/m².  No intake or output data in the 24 hours ending 10/29/19 1112   Physical Exam   Constitutional: She is oriented to person, place, and time. No distress.   HENT:   Head: Atraumatic.   Eyes: EOM are normal.   Neck: Neck supple.   Cardiovascular: Normal rate and regular rhythm.   Pulmonary/Chest: Effort normal and breath sounds normal.   Abdominal: Soft.   Neurological: She is oriented to person, place, and time.   Skin: Skin is warm and dry.       Significant Labs:   BMP:   Recent Labs   Lab 10/28/19  1841   *      K 5.0      CO2 20*   BUN 25*   CREATININE 1.3   CALCIUM 10.5     CBC:   Recent Labs   Lab 10/28/19  1841   WBC 3.64*   HGB 11.7*   HCT 36.3*        CMP:   Recent Labs   Lab 10/28/19  1841      K 5.0      CO2 20*   *   BUN 25*   CREATININE 1.3   CALCIUM 10.5   PROT 9.6*   ALBUMIN 4.0   BILITOT 0.4   ALKPHOS 94   AST 21   ALT 13   ANIONGAP 9   EGFRNONAA 42*       Significant Imaging:reviewed.      Assessment/Plan:      * Acute cerebral infarction  In the emergency department routine laboratory studies and head CT was performed.  There is questionable at evidence of an age-indeterminate infarction.  MRI was performed which did confirm an acute infarction of the right ruth ann-kyree.  Patient well outside the window for tPA.  Stroke protocol and management initiated.  Neurology consulted.PT,OT,ST is consulted.  On ASA,statin,  Will have MRA brain and carotid doppler.      Tobacco abuse  Consulted over 5 minutes need quit smoking.      Essential hypertension  On permissive HTN at this  time.      Type 2 diabetes mellitus with neurologic complication  On SSI.        VTE Risk Mitigation (From admission, onward)         Ordered     enoxaparin injection 40 mg  Daily      10/29/19 0839     IP VTE LOW RISK PATIENT  Once      10/29/19 0015     Place YULI hose  Until discontinued      10/29/19 0015     Place sequential compression device  Until discontinued      10/29/19 0015                      Emily Page MD  Department of Hospital Medicine   [facility]

## 2019-10-29 NOTE — CONSULTS
[facility]  Neurology  Consult Note    Patient Name: Lucina Villalpando  MRN: [id number]  Admission Date: 10/28/2019  Hospital Length of Stay: 1 days  Code Status: Full Code   Attending Provider: Emily Page MD   Consulting Provider: Cheng Jose MD  Primary Care Physician: Johan Harrison MD  Principal Problem:Acute cerebral infarction    Consults  Subjective:     Chief Complaint: Left side wekaness     HPI: 70 y/o female with medical Hx as listed below comes to ED after several days of slurred speech and LLE stiffness. Ms. Villalpando also tells me that she feels like she is choking on everything she eats. Denies headaches, visual disturbances, vertigo, right sided symptoms. Previous Hx of stroke according to daughter.    Past Medical History:   Diagnosis Date    Arthritis     Depression     Diabetes mellitus     Fibromyalgia     Hypertension     Stroke        Past Surgical History:   Procedure Laterality Date    BACK SURGERY      2 x for ruptured disc    KNEE SURGERY      right knee-meniscus       Review of patient's allergies indicates:  No Known Allergies    Current Neurological Medications:     No current facility-administered medications on file prior to encounter.      Current Outpatient Medications on File Prior to Encounter   Medication Sig    lisinopril-hydrochlorothiazide (PRINZIDE,ZESTORETIC) 20-25 mg Tab Take 1 tablet by mouth once daily.    QUEtiapine (SEROQUEL) 25 MG Tab Take 25 mg by mouth nightly as needed.    tramadol (ULTRAM) 50 mg tablet Take 50 mg by mouth every 6 (six) hours as needed for Pain.    cyanocobalamin (VITAMIN B-12) 1000 MCG tablet Take 100 mcg by mouth once daily.    glipiZIDE (GLUCOTROL) 5 MG tablet Take 5 mg by mouth 2 (two) times daily before meals.    naproxen (NAPROSYN) 500 MG tablet Take 500 mg by mouth 2 (two) times daily.    ondansetron (ZOFRAN-ODT) 8 MG TbDL Take 1 tablet (8 mg total) by mouth every 8 (eight) hours as needed (Nausea).     oxycodone (ROXICODONE) 5 MG immediate release tablet Take 1 tablet (5 mg total) by mouth every 6 (six) hours as needed for Pain.    potassium & sodium phosphates (PHOS-NAK) 280-160-250 mg PwPk Take 1 packet by mouth 4 (four) times daily with meals and nightly.    senna-docusate 8.6-50 mg (PERICOLACE) 8.6-50 mg per tablet Take 1 tablet by mouth 2 (two) times daily as needed for Constipation.      Family History     Problem Relation (Age of Onset)    Diabetes Mother, Sister        Tobacco Use    Smoking status: Current Every Day Smoker     Packs/day: 1.00     Years: 46.00     Pack years: 46.00     Types: Cigarettes    Smokeless tobacco: Never Used   Substance and Sexual Activity    Alcohol use: Yes     Comment: socially    Drug use: Yes     Types: Marijuana     Comment: weekly    Sexual activity: Not on file     Review of Systems   Constitutional: Negative for fever.   HENT: Negative for trouble swallowing.    Eyes: Negative for photophobia.   Respiratory: Negative for shortness of breath.    Cardiovascular: Negative for chest pain.   Gastrointestinal: Negative for abdominal pain.   Genitourinary: Negative for dysuria.   Musculoskeletal: Negative for back pain.   Neurological: Negative for headaches.     Objective:     Vital Signs (Most Recent):  Temp: 98 °F (36.7 °C) (10/29/19 0745)  Pulse: 64 (10/29/19 0745)  Resp: 18 (10/29/19 0745)  BP: (!) 151/72 (10/29/19 0745)  SpO2: 99 % (10/29/19 1338) Vital Signs (24h Range):  Temp:  [98 °F (36.7 °C)-99 °F (37.2 °C)] 98 °F (36.7 °C)  Pulse:  [] 64  Resp:  [16-24] 18  SpO2:  [98 %-100 %] 99 %  BP: (128-191)/(65-98) 151/72     Weight: 61.7 kg (136 lb 0.4 oz)  Body mass index is 18.97 kg/m².    Physical Exam   Constitutional: She is oriented to person, place, and time.   HENT:   Head: Normocephalic.   Eyes: Right eye exhibits no discharge. Left eye exhibits no discharge.   Neck: Normal range of motion.   Cardiovascular: Normal rate.   Pulmonary/Chest: Breath  sounds normal.   Abdominal: Soft.   Musculoskeletal: She exhibits no edema.   Neurological: She is oriented to person, place, and time. She has a normal Finger-Nose-Finger Test.   Psychiatric: Her speech is slurred.       NEUROLOGICAL EXAMINATION:     MENTAL STATUS   Oriented to person, place, and time.   Speech: slurred   Level of consciousness: alert    CRANIAL NERVES     CN III, IV, VI   Right pupil: Size: 2 mm. Shape: regular.   Left pupil: Size: 2 mm. Shape: regular.   Nystagmus: none   Ophthalmoparesis: none    CN V   Right facial sensation deficit: none  Left facial sensation deficit: none    CN VII   Right facial weakness: none  Left facial weakness: none    CN IX, X   Palate: symmetric    CN XI   Right trapezius strength: normal  Left trapezius strength: normal    CN XII   Tongue deviation: none    MOTOR EXAM        LLE drift  RIght 5/5     SENSORY EXAM   Right arm light touch: normal  Left arm light touch: normal  Right leg light touch: normal  Left leg light touch: normal    GAIT AND COORDINATION      Coordination   Finger to nose coordination: normal    NIHSS: 2      Significant Labs:   CBC:   Recent Labs   Lab 10/28/19  1841   WBC 3.64*   HGB 11.7*   HCT 36.3*        CMP:   Recent Labs   Lab 10/28/19  1841   *      K 5.0      CO2 20*   BUN 25*   CREATININE 1.3   CALCIUM 10.5   PROT 9.6*   ALBUMIN 4.0   BILITOT 0.4   ALKPHOS 94   AST 21   ALT 13   ANIONGAP 9   EGFRNONAA 42*       Significant Imaging:   MRI brain  Impression       Acute infarction in the right ruth ann-kyree.    Changes of chronic small vessel ischemic disease.    Old infarction in the left parietal and occipital lobes.    This report was flagged in Epic as abnormal.      Electronically signed by: Cosme Perkins MD  Date: 10/28/2019         Assessment and Plan:     68 y/o female with acute stroke    1. Stroke: acute right pontine stroke. Mild left sided motor deficits and dysarthria.   -ASA 81 mg and clopidogrel 75 mg  daily for 21 days.   -Permissive HTN for today. Treat if > 220/110.   -Smoking cessation   -PT/OT/ST evaluation.    Active Diagnoses:    Diagnosis Date Noted POA    PRINCIPAL PROBLEM:  Acute cerebral infarction [I63.9] 10/29/2019 Yes    Tobacco abuse [Z72.0] 10/29/2019 Yes    Type 2 diabetes mellitus with neurologic complication [E11.49] 09/16/2015 Yes    H/O: CVA (cerebrovascular accident) [Z86.73] 09/16/2015 Not Applicable    Essential hypertension [I10] 09/16/2015 Yes      Problems Resolved During this Admission:       VTE Risk Mitigation (From admission, onward)         Ordered     enoxaparin injection 40 mg  Daily      10/29/19 0839     IP VTE LOW RISK PATIENT  Once      10/29/19 0015     Place YULI hose  Until discontinued      10/29/19 0015     Place sequential compression device  Until discontinued      10/29/19 0015                Thank you for your consult. I will follow-up with patient. Please contact us if you have any additional questions.    Cheng Jose MD  Neurology  [facility]

## 2019-10-29 NOTE — PLAN OF CARE
10/29/19 1326   Discharge Assessment   Assessment Type Discharge Planning Assessment   Confirmed/corrected address and phone number on facesheet? Yes   Assessment information obtained from? Patient   Expected Length of Stay (days) 3   Communicated expected length of stay with patient/caregiver yes   Prior to hospitilization cognitive status: Alert/Oriented   Prior to hospitalization functional status: Assistive Equipment;Needs Assistance   Current cognitive status: Alert/Oriented   Current Functional Status: Assistive Equipment;Needs Assistance   Lives With alone   Able to Return to Prior Arrangements   (TBD)   Is patient able to care for self after discharge? No   Who are your caregiver(s) and their phone number(s)?  [facility]   Patient's perception of discharge disposition home or selfcare   Readmission Within the Last 30 Days no previous admission in last 30 days   Patient currently being followed by outpatient case management? No   Patient currently receives any other outside agency services? Yes   How many hours a day does the patient receive services? 5   Name and contact number of agency or person providing outside services [facility]   Is it the patient/care giver preference to resume care with the current outside agency? Yes   Equipment Currently Used at Home bedside commode;walker, rolling;shower chair;other (see comments)  (Scooter)   Do you have any problems affording any of your prescribed medications? No   Is the patient taking medications as prescribed? yes   Does the patient have transportation home? Yes   Transportation Anticipated family or friend will provide   Does the patient receive services at the [facility]? No   Discharge Plan A Home with family   Discharge Plan B Home Health   DME Needed Upon Discharge    (TBD)   Patient/Family in Agreement with Plan yes   To patient's room to discuss patient managing his care at home.      TN Role Explained.   "Patient identified by using 2 identifiers:  Name and date of birth    Patient stated that her caretaker WILL HELP AT HOME WITH her RECOVERY.       TN reviewed with patient contents of "[employer]".      TN name and contact info placed on the communication board    Preferred Pharmacy:  [id number] - [address] [place] [address] [place] [address]  Phone: [phone] Fax: [phone]      Preferred appointment time:evenings  "

## 2019-10-29 NOTE — PT/OT/SLP EVAL
"Occupational Therapy   Evaluation and Discharge Note    Name: Lucina Villalpando  MRN: [id number]  Admitting Diagnosis:  Acute cerebral infarction      Recommendations:     Discharge Recommendations: home(with family and PCA assistance)  Discharge Equipment Recommendations:  none  Barriers to discharge:  None    Assessment:     Lucina Villalpando is a 69 y.o. female with a medical diagnosis of Acute cerebral infarction. At this time, patient is functioning at their prior level of function and does not require further acute OT services.     Plan:     During this hospitalization, patient does not require further acute OT services.  Please re-consult if situation changes.    · Plan of Care Reviewed with: patient, daughter    Subjective     Chief Complaint: "I want to get back to bed."  Patient/Family Comments/goals: to get better and go home    Occupational Profile:  Living Environment: lives alone in a 2nd floor apartment with elevator access  Previous level of function: requires assistance with self care  Roles and Routines: has a PCA 7 days per week, 5 hours per day   Equipment Used at home:  rollator, power chair, bedside commode, bath bench  Assistance upon Discharge: from family and PCA    Pain/Comfort:  · Pain Rating 1: 0/10    Patients cultural, spiritual, Muslim conflicts given the current situation: no    Objective:     Communicated with: nurse prior to session.  Patient found up in chair with peripheral IV, telemetry upon OT entry to room.    General Precautions: Standard, fall   Orthopedic Precautions:N/A   Braces: N/A     Occupational Performance:    Bed Mobility:    · Patient completed Rolling/Turning to Right with stand by assistance  · Patient completed Scooting/Bridging with contact guard assistance  · Patient completed Sit to Supine with stand by assistance    Functional Mobility/Transfers:  · Patient completed Sit <> Stand Transfer with minimum assistance  with  no assistive device and hand-held " assist   · Patient completed Bed <> Chair Transfer using Stand Pivot technique with minimum assistance with no assistive device and hand-held assist  · Functional Mobility: able to transfer to bed from bedside chair with minimal assistance, hand-held, no assistive device    Activities of Daily Living:  · Feeding:  supervision    · Grooming: contact guard assistance    · Upper Body Dressing: minimum assistance    · Lower Body Dressing: moderate assistance      Cognitive/Visual Perceptual:  Cognitive/Psychosocial Skills:     -       Oriented to: Person and Situation   -       Follows Commands/attention:Follows one-step commands  -       Communication: clear/fluent  -       Memory: No Deficits noted  -       Safety awareness/insight to disability: intact   -       Mood/Affect/Coping skills/emotional control: Appropriate to situation    Physical Exam:  Balance:    -       sit balance good; standing balance fair  Postural examination/scapula alignment:    -       Rounded shoulders  Skin integrity: Visible skin intact  Upper Extremity Range of Motion:     -       Right Upper Extremity: WFL  -       Left Upper Extremity: WFL  Upper Extremity Strength:    -       Right Upper Extremity: WFL  -       Left Upper Extremity: WFL    AMPAC 6 Click ADL:  AMPAC Total Score: 18    Treatment & Education:  Evaluation   Education:    Patient left supine with all lines intact, call button in reach and nurse notified    GOALS:   Multidisciplinary Problems     Occupational Therapy Goals     Not on file          Multidisciplinary Problems (Resolved)        Problem: Occupational Therapy Goal    Goal Priority Disciplines Outcome Interventions   Occupational Therapy Goal   (Resolved)     OT, PT/OT Met                    History:     Past Medical History:   Diagnosis Date    Arthritis     Depression     Diabetes mellitus     Fibromyalgia     Hypertension     Stroke        Past Surgical History:   Procedure Laterality Date    BACK SURGERY       2 x for ruptured disc    KNEE SURGERY      right knee-meniscus       Time Tracking:     OT Date of Treatment: 10/29/19  OT Start Time: 1520  OT Stop Time: 1535  OT Total Time (min): 15 min    Billable Minutes:Evaluation 15 minutes    GARRY Chavez, MS  10/29/2019

## 2019-10-29 NOTE — PT/OT/SLP EVAL
Speech Language Pathology Evaluation  Bedside Swallow    Patient Name:  Lucina Villalpando   MRN:  [id number]  Admitting Diagnosis: Acute cerebral infarction    Recommendations:                  General Recommendations:  Clinical Swallow Evaluation  Diet recommendations:  Dental Soft, No Bread, No Rice, Thin(Cup only)   Aspiration Precautions: Alternating bites/sips, Chin tuck, HOB to 90 degrees, Meds whole 1 at a time and No straws   General Precautions: Standard,    Communication strategies:  none    History:     Past Medical History:   Diagnosis Date    Arthritis     Depression     Diabetes mellitus     Fibromyalgia     Hypertension     Stroke        Past Surgical History:   Procedure Laterality Date    BACK SURGERY      2 x for ruptured disc    KNEE SURGERY      right knee-meniscus       Social History: Patient lives alone. She receives needed support with ADLs from personal care attendant who provides daily assist.     Prior Intubation HX:  None    Modified Barium Swallow: None    Chest X-Rays: Last Chest x-ray 10-28-19     Prior diet: Reg diet/TL    Subjective   Pt in bed, she was alert and eager to participate in bedside swallow evaluation.     Pain/Comfort:  · Pain Rating 1: 0/10    Objective:       Oral Musculature Evaluation  · Oral Musculature: WFL  · Dentition: rarely or never uses dentures to eat  · Secretion Management: adequate  · Mucosal Quality: adequate  · Mandibular Strength and Mobility: WFL  · Oral Labial Strength and Mobility: WFL  · Lingual Strength and Mobility: impaired strength  · Velar Elevation: WFL  · Buccal Strength and Mobility: WFL  · Volitional Cough: Adequate for airway clearance  · Volitional Swallow: Delayed volitional swallow; adequate to clear oral cavity  · Voice Prior to PO Intake: Clear   · Oral Musculature Comments: WFL    Bedside Swallow Eval:   Consistencies Assessed:  · Thin liquids via cup   · Puree approx 4 (1/2 tsp of pudding)  · Soft solids approx 2-4 (pieces of  marla poon      Oral Phase:   · Prolonged mastication  · Slow oral transit time    Pharyngeal Phase:   · decreased hyolaryngeal excursion to palpation  · delayed swallow initation    Compensatory Strategies  · Chin tuck  · Volitional cough/throat clear followed by consecutive swallow    Treatment: Clinical Swallow Evaluation    Assessment:     Lucina Villalpando is a 69 y.o. female with a diagnosis Acute cerebral infarction. She presents with mod oral pharyngeal dysphagia secondary delayed A-P transport, decreased left sided lingual strength/ROM. Prolonged mastication, delayed swallow initiation and delayed laryngeal elevation/excursion noted as well. However, pt participated in PO trials and demonstrated Throat Clear TC  X2 and was instructed to perform consecutive swallow following TC and wet vocal quality. It was effective to clear. Pt performed independent use of Chin Tuck (CT) during assessment and stated use with meals. Pt swallow WFL for dental diet/TL via cup. ST will follow.            Goals:   Multidisciplinary Problems     SLP Goals        Problem: SLP Goal    Goal Priority Disciplines Outcome   SLP Goal     SLP    Description:  1. Pt will tolerate LRD (dental soft consistency/TL) with no overt s/s of aspiration.   2. Pt will demonstrate knowledge/use of safe swallow strategies with min assist with current diet.   3. Pt will perform OMEs to improve oral motor function with mod assist.                     Plan:     · Patient to be seen:  3 x/week   · Plan of Care expires:  11/08/19  · Plan of Care reviewed with:  patient, daughter   · SLP Follow-Up:  Yes       Discharge recommendations:  Discharge Facility/Level of Care Needs: (TBD)   Barriers to Discharge:  None    Time Tracking:     SLP Treatment Date:   10/29/19  Speech Start Time:  1420  Speech Stop Time:  1440     Speech Total Time (min):  20 min    Billable Minutes: Eval Swallow and Oral Function 20 min    Lorena Snow  CF-SLP  10/29/2019

## 2019-10-29 NOTE — HPI
Lucina Villalpando is a 69 y.o. female that (in part)  has a past medical history of Arthritis, Depression, Diabetes mellitus, Fibromyalgia, Hypertension, and Stroke.  has a past surgical history that includes Knee surgery and Back surgery. Presents to [facility] complaining of slurred speech associated with altered mental status.  Last Ativan normal at approximately 12:30 a.m. on 10/27.  From Saturday the patient daughter found her on the floor and she was unable to speak.  EMS was activated.  When ambulance arrived the patient was asymptomatic and she refused further evaluation in the emergency department.  However over rated the weekend the base daughter about she was still experiencing insert speech and was having what sounds like aspiration while eating.  She is cyst and she come in for further evaluation.  She was experiencing drooling as well.  Remainder of the history is otherwise limited due to the current condition of the patient as well as her level of cooperation at this time.    In the emergency department routine laboratory studies and head CT was performed.  There is questionable at evidence of an age-indeterminate infarction.  MRI was performed which did confirm an acute infarction of the right ruth ann-kyree.  Patient well outside the window for tPA.  Stroke protocol and management initiated.  Neurology consulted.    Hospital medicine has been asked to admit for further evaluation and treatment.

## 2019-10-29 NOTE — PT/OT/SLP PROGRESS
Occupational Therapy      Patient Name:  Lucina Villalpando   MRN:  [id number]    Patient not seen today secondary to Unavailable (Comment)(Patient off the floor a testing x2 944am 1154am). Will follow-up this afternoon.    GARRY Chavez, MS  10/29/2019

## 2019-10-30 LAB
ALBUMIN SERPL BCP-MCNC: 3.6 G/DL (ref 3.5–5.2)
ALP SERPL-CCNC: 90 U/L (ref 55–135)
ALT SERPL W/O P-5'-P-CCNC: 9 U/L (ref 10–44)
ANION GAP SERPL CALC-SCNC: 6 MMOL/L (ref 8–16)
AST SERPL-CCNC: 16 U/L (ref 10–40)
BASOPHILS # BLD AUTO: 0.03 K/UL (ref 0–0.2)
BASOPHILS NFR BLD: 1 % (ref 0–1.9)
BILIRUB SERPL-MCNC: 0.3 MG/DL (ref 0.1–1)
BUN SERPL-MCNC: 26 MG/DL (ref 8–23)
CALCIUM SERPL-MCNC: 9.9 MG/DL (ref 8.7–10.5)
CHLORIDE SERPL-SCNC: 112 MMOL/L (ref 95–110)
CO2 SERPL-SCNC: 22 MMOL/L (ref 23–29)
CREAT SERPL-MCNC: 1.3 MG/DL (ref 0.5–1.4)
DIFFERENTIAL METHOD: ABNORMAL
EOSINOPHIL # BLD AUTO: 0 K/UL (ref 0–0.5)
EOSINOPHIL NFR BLD: 0.7 % (ref 0–8)
ERYTHROCYTE [DISTWIDTH] IN BLOOD BY AUTOMATED COUNT: 13.1 % (ref 11.5–14.5)
EST. GFR  (AFRICAN AMERICAN): 49 ML/MIN/1.73 M^2
EST. GFR  (NON AFRICAN AMERICAN): 42 ML/MIN/1.73 M^2
GLUCOSE SERPL-MCNC: 127 MG/DL (ref 70–110)
HCT VFR BLD AUTO: 34.2 % (ref 37–48.5)
HGB BLD-MCNC: 11.2 G/DL (ref 12–16)
IMM GRANULOCYTES # BLD AUTO: 0.01 K/UL (ref 0–0.04)
IMM GRANULOCYTES NFR BLD AUTO: 0.3 % (ref 0–0.5)
LYMPHOCYTES # BLD AUTO: 1.6 K/UL (ref 1–4.8)
LYMPHOCYTES NFR BLD: 51.7 % (ref 18–48)
MAGNESIUM SERPL-MCNC: 1.9 MG/DL (ref 1.6–2.6)
MCH RBC QN AUTO: 28.1 PG (ref 27–31)
MCHC RBC AUTO-ENTMCNC: 32.7 G/DL (ref 32–36)
MCV RBC AUTO: 86 FL (ref 82–98)
MONOCYTES # BLD AUTO: 0.3 K/UL (ref 0.3–1)
MONOCYTES NFR BLD: 10.3 % (ref 4–15)
NEUTROPHILS # BLD AUTO: 1.1 K/UL (ref 1.8–7.7)
NEUTROPHILS NFR BLD: 36 % (ref 38–73)
NRBC BLD-RTO: 0 /100 WBC
PHOSPHATE SERPL-MCNC: 3 MG/DL (ref 2.7–4.5)
PLATELET # BLD AUTO: 208 K/UL (ref 150–350)
PMV BLD AUTO: 12 FL (ref 9.2–12.9)
POCT GLUCOSE: 104 MG/DL (ref 70–110)
POCT GLUCOSE: 183 MG/DL (ref 70–110)
POCT GLUCOSE: 217 MG/DL (ref 70–110)
POCT GLUCOSE: 243 MG/DL (ref 70–110)
POTASSIUM SERPL-SCNC: 4.7 MMOL/L (ref 3.5–5.1)
PROT SERPL-MCNC: 8.7 G/DL (ref 6–8.4)
RBC # BLD AUTO: 3.99 M/UL (ref 4–5.4)
SODIUM SERPL-SCNC: 140 MMOL/L (ref 136–145)
WBC # BLD AUTO: 3.02 K/UL (ref 3.9–12.7)

## 2019-10-30 PROCEDURE — 83735 ASSAY OF MAGNESIUM: CPT

## 2019-10-30 PROCEDURE — 92523 SPEECH SOUND LANG COMPREHEN: CPT

## 2019-10-30 PROCEDURE — 92526 ORAL FUNCTION THERAPY: CPT

## 2019-10-30 PROCEDURE — 21400001 HC TELEMETRY ROOM

## 2019-10-30 PROCEDURE — 63600175 PHARM REV CODE 636 W HCPCS: Performed by: HOSPITALIST

## 2019-10-30 PROCEDURE — 25000003 PHARM REV CODE 250: Performed by: HOSPITALIST

## 2019-10-30 PROCEDURE — 85025 COMPLETE CBC W/AUTO DIFF WBC: CPT

## 2019-10-30 PROCEDURE — 94761 N-INVAS EAR/PLS OXIMETRY MLT: CPT

## 2019-10-30 PROCEDURE — 84100 ASSAY OF PHOSPHORUS: CPT

## 2019-10-30 PROCEDURE — 97110 THERAPEUTIC EXERCISES: CPT

## 2019-10-30 PROCEDURE — 99232 PR SUBSEQUENT HOSPITAL CARE,LEVL II: ICD-10-PCS | Mod: ,,, | Performed by: PSYCHIATRY & NEUROLOGY

## 2019-10-30 PROCEDURE — 97116 GAIT TRAINING THERAPY: CPT

## 2019-10-30 PROCEDURE — 99232 SBSQ HOSP IP/OBS MODERATE 35: CPT | Mod: ,,, | Performed by: PSYCHIATRY & NEUROLOGY

## 2019-10-30 PROCEDURE — 36415 COLL VENOUS BLD VENIPUNCTURE: CPT

## 2019-10-30 PROCEDURE — 94799 UNLISTED PULMONARY SVC/PX: CPT

## 2019-10-30 PROCEDURE — 80053 COMPREHEN METABOLIC PANEL: CPT

## 2019-10-30 RX ORDER — HYDROCHLOROTHIAZIDE 12.5 MG/1
12.5 TABLET ORAL DAILY
Status: DISCONTINUED | OUTPATIENT
Start: 2019-10-30 | End: 2019-10-31 | Stop reason: HOSPADM

## 2019-10-30 RX ADMIN — ATORVASTATIN CALCIUM 40 MG: 40 TABLET, FILM COATED ORAL at 08:10

## 2019-10-30 RX ADMIN — ASPIRIN 325 MG: 325 TABLET, DELAYED RELEASE ORAL at 08:10

## 2019-10-30 RX ADMIN — HYDROCHLOROTHIAZIDE 12.5 MG: 12.5 TABLET ORAL at 09:10

## 2019-10-30 RX ADMIN — ENOXAPARIN SODIUM 40 MG: 100 INJECTION SUBCUTANEOUS at 06:10

## 2019-10-30 RX ADMIN — INSULIN ASPART 1 UNITS: 100 INJECTION, SOLUTION INTRAVENOUS; SUBCUTANEOUS at 08:10

## 2019-10-30 RX ADMIN — QUETIAPINE FUMARATE 25 MG: 25 TABLET ORAL at 08:10

## 2019-10-30 NOTE — SUBJECTIVE & OBJECTIVE
Interval History: patient is alert.    Review of Systems   Constitutional: Positive for activity change, appetite change and fatigue.   HENT: Negative for congestion.    Eyes: Negative for discharge and itching.   Respiratory: Negative for apnea and chest tightness.    Cardiovascular: Negative for chest pain and leg swelling.   Gastrointestinal: Negative for abdominal distention.   Endocrine: Negative for heat intolerance.   Neurological: Positive for speech difficulty and weakness.     Objective:     Vital Signs (Most Recent):  Temp: 97.5 °F (36.4 °C) (10/30/19 0745)  Pulse: 67 (10/30/19 0745)  Resp: 18 (10/30/19 0745)  BP: (!) 141/68 (10/30/19 0745)  SpO2: 100 % (10/30/19 0745) Vital Signs (24h Range):  Temp:  [97.4 °F (36.3 °C)-98.5 °F (36.9 °C)] 97.5 °F (36.4 °C)  Pulse:  [67-98] 67  Resp:  [17-19] 18  SpO2:  [99 %-100 %] 100 %  BP: (115-149)/(61-75) 141/68     Weight: 58.3 kg (128 lb 8.5 oz)  Body mass index is 17.93 kg/m².    Intake/Output Summary (Last 24 hours) at 10/30/2019 0937  Last data filed at 10/29/2019 1714  Gross per 24 hour   Intake 240 ml   Output --   Net 240 ml      Physical Exam   Constitutional: She is oriented to person, place, and time. No distress.   HENT:   Head: Atraumatic.   Eyes: EOM are normal.   Neck: Neck supple.   Cardiovascular: Normal rate and regular rhythm.   Pulmonary/Chest: Effort normal and breath sounds normal.   Abdominal: Soft.   Neurological: She is oriented to person, place, and time.   Skin: Skin is warm and dry.       Significant Labs:   BMP:   Recent Labs   Lab 10/30/19  0451   *      K 4.7   *   CO2 22*   BUN 26*   CREATININE 1.3   CALCIUM 9.9   MG 1.9     CBC:   Recent Labs   Lab 10/28/19  1841 10/30/19  0451   WBC 3.64* 3.02*   HGB 11.7* 11.2*   HCT 36.3* 34.2*    208     CMP:   Recent Labs   Lab 10/28/19  1841 10/30/19  0451    140   K 5.0 4.7    112*   CO2 20* 22*   * 127*   BUN 25* 26*   CREATININE 1.3 1.3   CALCIUM  10.5 9.9   PROT 9.6* 8.7*   ALBUMIN 4.0 3.6   BILITOT 0.4 0.3   ALKPHOS 94 90   AST 21 16   ALT 13 9*   ANIONGAP 9 6*   EGFRNONAA 42* 42*       Significant Imaging:reviewed.

## 2019-10-30 NOTE — NURSING
Report given to oncoming nurse Hoang RN, Patient is awake and alert. Bed is in lowest position, wheels locked, call light is in reach. 12 hour chart check completed. Daughters at the bedside

## 2019-10-30 NOTE — PLAN OF CARE
Problem: Physical Therapy Goal  Goal: Physical Therapy Goal  Description  Goals to be met by: 19     Patient will increase functional independence with mobility by performin. Supine to sit with Modified [place]  2. Rolling to Left and Right with Modified [place]  3. Sit to stand transfer with Modified [place]  4. Bed to chair transfer with Modified [place]   5. Gait 200 feet with Modified [place] using Rollator   6. Upper/Lower extremity exercise program 3 sets x10 reps per handout, with independence     Outcome: Ongoing, Progressing    Pt ambulated ~100 ft x 2 trials with CGA using rollator.

## 2019-10-30 NOTE — PLAN OF CARE
Problem: SLP Goal  Goal: SLP Goal  Description  1. Pt will tolerate LRD (dental soft consistency/TL) with no overt s/s of aspiration x3.  2. Pt will demonstrate knowledge/use of safe swallow strategies with min assist with current diet.   3. Pt will perform OMEs to improve oral motor function with mod assist.    4. Pt will answer problem solving and safety awareness questions with 85% acc no cues.   5. Pt will answer simple, functional money and time management questions with 85% acc min cues.   6. Pt will complete thought organization activities with 85% acc min cues.   7. Pt will recall functional information after a 5+ minute delay given no cues.   8. Pt will participate in further assessment of motor speech with updated goals to follow.      Outcome: Ongoing, Progressing   [facility] Evaluation completed. Pt presents with mild cognitive-linguistic impairments and Dysarthria. Pt not at baseline for speech, language, and cognition per family. ST will continue to follow.   JUAN Morelos., CCC-SLP  10/30/2019

## 2019-10-30 NOTE — PT/OT/SLP PROGRESS
Physical Therapy Treatment    Patient Name:  Lucina Villalpando   MRN:  [id number]    Recommendations:     Discharge Recommendations:  home health PT(with family assistance)   Discharge Equipment Recommendations: none   Barriers to discharge: None    Assessment:     Lucina Villalpando is a 69 y.o. female admitted with a medical diagnosis of Acute cerebral infarction.  She presents with the following impairments/functional limitations:  weakness, impaired endurance, impaired functional mobilty, gait instability, impaired balance, decreased lower extremity function, decreased safety awareness.    Rehab Prognosis: Fair+; patient would benefit from acute skilled PT services to address these deficits and reach maximum level of function.    Recent Surgery: * No surgery found *      Plan:     During this hospitalization, patient to be seen 5 x/week to address the identified rehab impairments via gait training, therapeutic exercises, therapeutic activities and progress toward the following goals:    · Plan of Care Expires:  11/12/19    Subjective     Chief Complaint: N/A  Patient/Family Comments/goals: Pt agreeable to therapy.    Pain/Comfort:  · Pain Rating 1: 0/10      Objective:     Communicated with nurse Nelson prior to session.  Patient found HOB elevated with peripheral IV, telemetry upon PT entry to room.     General Precautions: Standard, fall   Orthopedic Precautions:N/A   Braces: N/A     Functional Mobility:  · Bed Mobility:     · Scooting: stand by assistance and contact guard assistance  · Supine to Sit: stand by assistance and contact guard assistance with HOB elevated   · Transfers:     · Sit to Stand:  minimum assistance with rollator x 2 trials  · Bed to Chair: contact guard assistance with  4 wheeled walker  using  Step Transfer  · Gait: Pt ambulated ~100 ft x 2 trials with CGA using rollator.  Pt with slight forward flexed posture, decreased step length, and decreased raj.  Pt with decreased endurance,  required seated rest break on rollator in the hallway.  Pt stated that she stays in her bed at home most of the day, only ambulating short distances to the bathroom at home.    · Balance: Pt with fair dynamic standing balance.       AM-PAC 6 CLICK MOBILITY  Turning over in bed (including adjusting bedclothes, sheets and blankets)?: 4  Sitting down on and standing up from a chair with arms (e.g., wheelchair, bedside commode, etc.): 3  Moving from lying on back to sitting on the side of the bed?: 4  Moving to and from a bed to a chair (including a wheelchair)?: 3  Need to walk in hospital room?: 3  Climbing 3-5 steps with a railing?: 3  Basic Mobility Total Score: 20       Therapeutic Activities and Exercises:  BLE seated/supine therex 10 reps: hip flex, LAQ, pillow squeezes, AP, and hip abd/add    Pt issued HEP for seated/supine LE therex, encouraged to continue therex at home.  Pt verbalized understanding.      Patient left up in chair reclined with all lines intact, call button in reach and nurse Trinidad notified for nurse Nelson.      GOALS:   Multidisciplinary Problems     Physical Therapy Goals        Problem: Physical Therapy Goal    Goal Priority Disciplines Outcome Goal Variances Interventions   Physical Therapy Goal     PT, PT/OT Ongoing, Progressing     Description:  Goals to be met by: 19     Patient will increase functional independence with mobility by performin. Supine to sit with Modified [place]  2. Rolling to Left and Right with Modified [place]  3. Sit to stand transfer with Modified [place]  4. Bed to chair transfer with Modified [place]   5. Gait 200 feet with Modified [place] using Rollator   6. Upper/Lower extremity exercise program 3 sets x10 reps per handout, with independence                      Time Tracking:     PT Received On: 10/30/19  PT Start Time: 1439     PT Stop Time: 1503  PT Total Time (min): 24 min     Billable Minutes: Gait Training 14 min  and Therapeutic Exercise 10 min                   Arlen Dozier, PT  10/30/2019

## 2019-10-30 NOTE — PT/OT/SLP EVAL
Speech Language Pathology Evaluation  Cognitive Communication    Patient Name:  Lucina Villalpando   MRN:  [id number]   [id number]    Admitting Diagnosis: Acute cerebral infarction    Recommendations:     Recommendations:                General Recommendations:  Dysphagia therapy, Speech/language therapy and Cognitive-linguistic therapy  Diet recommendations:  Dental Soft, No Rice, No Bread, Chopped meat, Thin(NO STRAWS)   Aspiration Precautions:   · 1 small bite/sip at a time,   · Eliminate distractions-NO TALKING DURING MEALS,   · HOB to 90 degrees,   · Meds whole 1 at a time,  ·  Monitor for s/s of aspiration,   · No straws    General Precautions: Standard, aspiration, fall  Communication strategies:  none    History:     Past Medical History:   Diagnosis Date    Arthritis     Depression     Diabetes mellitus     Fibromyalgia     Hypertension     Stroke        Past Surgical History:   Procedure Laterality Date    BACK SURGERY      2 x for ruptured disc    KNEE SURGERY      right knee-meniscus       Social History: Patient lives in an assisted living facility with a caregiver 7 days a week for 4-5 hours a day. She has three daughters (one lives in [place]).     Chest X-Rays: No acute cardiopulmonary process identified.      Subjective     Patient awake with lunch tray. Daughter present in room, however, daughter leaving upon SLP entry. Prior to exit, daughter reports patient presenting with decreased safety awareness and increased confusion.   Patient goals: to continue current living environment      Objective:     COGNITIVE STATUS:  Behavioral Observations: cooperative  Memory:  · Immediate: WFL  · Short Term: mildly impaired  · Long Term: WFL  Orientation: oriented x4  Attention: impaired sustained attn.  Problem Solving: impaired safety awareness and problem solving skills  Pragmatics: wnl  Simple Money/Time Management: impaired money and time management    LANGUAGE:   Receptive Language:  Simple y/n  Questions: wnl  Complex y/n Questions: 100% acc  Identification: 100% acc  1 Step Directions: 100% acc  2 Step Directions: tba  Complex Directions: tba    Expressive Language:   Naming:   · Divergent: named 11 animals in 60 seconds; impaired thought organizational skills  · Convergent: wfl  · Confrontational: wfl  Automatic Speech: wfl  Sentence Completion: wfl  Responsive Speech: wfl  Repetition: wfl    Motor Speech: Dysarhtria noted; slurred speech; imprecise consonants    Voice: inconsistently strained/hoarse likely 2/2 coughing    Augmentative Alternative Communication: no    Treatment: Patient assessed with remainder of lunch tray. Patient did present with coughing/choking episode x1 after large consecutive sips of thin liquids. When cued to take single,small sips, patient did not present with any overt s/s of aspiration. SLP provided education on SLP recommendations, importance of small/single sips, SLP role, s/s and risks of aspiration, safe swallow precautions, and POC. Patient verbalized understanding of all discussed and is in agreement with POC.     Assessment:   Lucina Villalpando is a 69 y.o. female with an SLP diagnosis of Dysphagia, Dysarthria and Cognitive-Linguistic Impairment.  ST will continue to follow.     Goals:   Multidisciplinary Problems     SLP Goals        Problem: SLP Goal    Goal Priority Disciplines Outcome   SLP Goal     SLP Ongoing, Progressing   Description:  1. Pt will tolerate LRD (dental soft consistency/TL) with no overt s/s of aspiration x3.  2. Pt will demonstrate knowledge/use of safe swallow strategies with min assist with current diet.   3. Pt will perform OMEs to improve oral motor function with mod assist.    4. Pt will answer problem solving and safety awareness questions with 85% acc no cues.   5. Pt will answer simple, functional money and time management questions with 85% acc min cues.   6. Pt will complete thought organization activities with 85% acc min cues.   7. Pt  will recall functional information after a 5+ minute delay given no cues.   8. Pt will participate in further assessment of motor speech with updated goals to follow.                       Plan:   · Patient to be seen:  3 x/week   · Plan of Care expires:  11/08/19  · Plan of Care reviewed with:  patient   · SLP Follow-Up:  Yes       Discharge recommendations:  Discharge Facility/Level of Care Needs: (Ongoing ST s/p discharge)   Barriers to Discharge:  None    Time Tracking:   SLP Treatment Date:   10/30/19  Speech Start Time:  1225  Speech Stop Time:  1251     Speech Total Time (min):  26 min    Billable Minutes: Treatment Swallowing Dysfunction 10 and Eval Swallow and Oral Function 16    TEVIN Bustillo, CCC-SLP  10/30/2019

## 2019-10-30 NOTE — ASSESSMENT & PLAN NOTE
In the emergency department routine laboratory studies and head CT was performed.  There is questionable at evidence of an age-indeterminate infarction.  MRI was performed which did confirm an acute infarction of the right ruth ann-kyree.  Patient well outside the window for tPA.  Stroke protocol and management initiated.  Neurology consulted.PT,OT,ST is consulted.  On ASA,statin,  MRA brain and carotid doppler are unremarkable,

## 2019-10-30 NOTE — PROGRESS NOTES
[facility]  Neurology  Progress Note    Patient Name: Lucina Villalpando  MRN: [id number]  Admission Date: 10/28/2019  Hospital Length of Stay: 2 days  Code Status: Full Code   Attending Provider: Emily Page MD  Primary Care Physician: Johan Harrison MD   Principal Problem:Acute cerebral infarction    Subjective:     Interval History: 68 y/o female with medical Hx as listed below comes to ED after several days of slurred speech and LLE stiffness. Ms. Villalpando also tells me that she feels like she is choking on everything she eats. Denies headaches, visual disturbances, vertigo, right sided symptoms. Previous Hx of stroke according to daughter.    -10/30/19: Pt with no new complaints.    Current Neurological Medications:     Current Facility-Administered Medications   Medication Dose Route Frequency Provider Last Rate Last Dose    acetaminophen tablet 650 mg  650 mg Oral Q8H PRN Nitin Myrick MD        aspirin EC tablet 325 mg  325 mg Oral Daily Emily Page MD   325 mg at 10/30/19 0832    atorvastatin tablet 40 mg  40 mg Oral QHS Emily Page MD   40 mg at 10/29/19 2133    dextrose 50% injection 12.5 g  12.5 g Intravenous PRN Nitin Myrick MD        dextrose 50% injection 25 g  25 g Intravenous PRN Nitin Myrick MD        enoxaparin injection 40 mg  40 mg Subcutaneous Daily Emily Page MD   40 mg at 10/29/19 1712    glucagon (human recombinant) injection 1 mg  1 mg Intramuscular PRN Nitin Myrick MD        glucose chewable tablet 16 g  16 g Oral PRN Nitin Myrick MD        glucose chewable tablet 24 g  24 g Oral PRN Nitin Myrick MD        hydroCHLOROthiazide tablet 12.5 mg  12.5 mg Oral Daily Emily Page MD   12.5 mg at 10/30/19 0958    influenza (HIGH-DOSE PF) vaccine 0.5 mL  0.5 mL Intramuscular vaccine x 1 dose Nitin Myrick MD        insulin aspart U-100 pen 0-5 Units  0-5 Units Subcutaneous  QID (AC + HS) PRN Nitin Myrick MD        metoprolol injection 5 mg  5 mg Intravenous Q5 Min PRN Nitin Myrick MD        ondansetron injection 8 mg  8 mg Intravenous Q8H PRN Nitin Myrick MD        pneumoc 13-brandy conj-dip cr(PF) (PREVNAR 13 (PF)) 0.5 mL  0.5 mL Intramuscular Prior to discharge Nitin Myrick MD        QUEtiapine tablet 25 mg  25 mg Oral Nightly PRN Nitin Myrick MD   25 mg at 10/29/19 2133    senna-docusate 8.6-50 mg per tablet 1 tablet  1 tablet Oral BID PRN Nitin Myrick MD        sodium chloride 0.9% flush 10 mL  10 mL Intravenous PRN Nitin Myrick MD        traMADol tablet 50 mg  50 mg Oral Q6H PRN Nitin Myrick MD           Review of Systems   Constitutional: Negative for fever.   HENT: Negative for trouble swallowing.    Eyes: Negative for photophobia.   Respiratory: Negative for shortness of breath.    Cardiovascular: Negative for chest pain.   Gastrointestinal: Negative for abdominal pain.   Genitourinary: Negative for dysuria.   Musculoskeletal: Negative for back pain.   Neurological: Negative for headaches.     Objective:     Vital Signs (Most Recent):  Temp: 97.5 °F (36.4 °C) (10/30/19 0745)  Pulse: 67 (10/30/19 0745)  Resp: 18 (10/30/19 0745)  BP: (!) 141/68 (10/30/19 0745)  SpO2: 100 % (10/30/19 0745) Vital Signs (24h Range):  Temp:  [97.4 °F (36.3 °C)-98.5 °F (36.9 °C)] 97.5 °F (36.4 °C)  Pulse:  [67-98] 67  Resp:  [17-19] 18  SpO2:  [99 %-100 %] 100 %  BP: (115-149)/(61-75) 141/68     Weight: 58.3 kg (128 lb 8.5 oz)  Body mass index is 17.93 kg/m².    Physical Exam  Constitutional: She is oriented to person, place, and time.   HENT:   Head: Normocephalic.   Eyes: Right eye exhibits no discharge. Left eye exhibits no discharge.   Neck: Normal range of motion.   Cardiovascular: Normal rate.   Pulmonary/Chest: Breath sounds normal.   Abdominal: Soft.   Musculoskeletal: She exhibits no edema.   Neurological: She is oriented to person,  place, and time. She has a normal Finger-Nose-Finger Test.   Psychiatric: Her speech is slurred.         NEUROLOGICAL EXAMINATION:      MENTAL STATUS   Oriented to person, place, and time.   Speech: slurred   Level of consciousness: alert     CRANIAL NERVES      CN III, IV, VI   Right pupil: Size: 2 mm. Shape: regular.   Left pupil: Size: 2 mm. Shape: regular.   Nystagmus: none   Ophthalmoparesis: none     CN V   Right facial sensation deficit: none  Left facial sensation deficit: none     CN VII   Right facial weakness: none  Left facial weakness: none     CN IX, X   Palate: symmetric     CN XI   Right trapezius strength: normal  Left trapezius strength: normal     CN XII   Tongue deviation: none     MOTOR EXAM        LLE drift  RIght 5/5      SENSORY EXAM   Right arm light touch: normal  Left arm light touch: normal  Right leg light touch: normal  Left leg light touch: normal     GAIT AND COORDINATION      Coordination   Finger to nose coordination: normal     NIHSS: 2       Significant Labs:   CBC:   Recent Labs   Lab 10/28/19  1841 10/30/19  0451   WBC 3.64* 3.02*   HGB 11.7* 11.2*   HCT 36.3* 34.2*    208     CMP:   Recent Labs   Lab 10/28/19  1841 10/30/19  0451   * 127*    140   K 5.0 4.7    112*   CO2 20* 22*   BUN 25* 26*   CREATININE 1.3 1.3   CALCIUM 10.5 9.9   MG  --  1.9   PROT 9.6* 8.7*   ALBUMIN 4.0 3.6   BILITOT 0.4 0.3   ALKPHOS 94 90   AST 21 16   ALT 13 9*   ANIONGAP 9 6*   EGFRNONAA 42* 42*         Assessment and Plan:     68 y/o female with acute stroke     1. Stroke: acute right pontine stroke. Mild left sided motor deficits and dysarthria. No signs intra/extracrnial stenotic disease.           -ASA 81 mg and clopidogrel 75 mg daily for 21 days.           -OK to treat BP.           -Smoking cessation           -No need for inpatient rehab as of today.    Active Diagnoses:    Diagnosis Date Noted POA    PRINCIPAL PROBLEM:  Acute cerebral infarction [I63.9] 10/29/2019  Yes    Tobacco abuse [Z72.0] 10/29/2019 Yes    Type 2 diabetes mellitus with neurologic complication [E11.49] 09/16/2015 Yes    H/O: CVA (cerebrovascular accident) [Z86.73] 09/16/2015 Not Applicable    Essential hypertension [I10] 09/16/2015 Yes      Problems Resolved During this Admission:       VTE Risk Mitigation (From admission, onward)         Ordered     enoxaparin injection 40 mg  Daily      10/29/19 0839     IP VTE LOW RISK PATIENT  Once      10/29/19 0015     Place YULI hose  Until discontinued      10/29/19 0015     Place sequential compression device  Until discontinued      10/29/19 0015                Cheng Jose MD  Neurology  [facility]

## 2019-10-30 NOTE — PLAN OF CARE
Problem: Diabetes Comorbidity  Goal: Blood Glucose Level Within Desired Range  Outcome: Ongoing, Progressing

## 2019-10-31 VITALS
HEART RATE: 78 BPM | BODY MASS INDEX: 17.99 KG/M2 | TEMPERATURE: 98 F | SYSTOLIC BLOOD PRESSURE: 151 MMHG | DIASTOLIC BLOOD PRESSURE: 64 MMHG | WEIGHT: 128.5 LBS | HEIGHT: 71 IN | RESPIRATION RATE: 17 BRPM | OXYGEN SATURATION: 99 %

## 2019-10-31 LAB
AMPHET+METHAMPHET UR QL: NEGATIVE
BARBITURATES UR QL SCN>200 NG/ML: NEGATIVE
BENZODIAZ UR QL SCN>200 NG/ML: NEGATIVE
BZE UR QL SCN: NEGATIVE
CANNABINOIDS UR QL SCN: NORMAL
CREAT UR-MCNC: 72 MG/DL (ref 15–325)
ETHANOL UR-MCNC: <10 MG/DL
METHADONE UR QL SCN>300 NG/ML: NEGATIVE
OPIATES UR QL SCN: NEGATIVE
PCP UR QL SCN>25 NG/ML: NEGATIVE
POCT GLUCOSE: 131 MG/DL (ref 70–110)
POCT GLUCOSE: 173 MG/DL (ref 70–110)
POCT GLUCOSE: 173 MG/DL (ref 70–110)
POCT GLUCOSE: 98 MG/DL (ref 70–110)
TOXICOLOGY INFORMATION: NORMAL

## 2019-10-31 PROCEDURE — 90662 IIV NO PRSV INCREASED AG IM: CPT | Performed by: HOSPITALIST

## 2019-10-31 PROCEDURE — 80307 DRUG TEST PRSMV CHEM ANLYZR: CPT

## 2019-10-31 PROCEDURE — 92526 ORAL FUNCTION THERAPY: CPT

## 2019-10-31 PROCEDURE — 90471 IMMUNIZATION ADMIN: CPT | Performed by: HOSPITALIST

## 2019-10-31 PROCEDURE — 94761 N-INVAS EAR/PLS OXIMETRY MLT: CPT

## 2019-10-31 PROCEDURE — 25000003 PHARM REV CODE 250: Performed by: HOSPITALIST

## 2019-10-31 PROCEDURE — G0008 ADMIN INFLUENZA VIRUS VAC: HCPCS | Performed by: HOSPITALIST

## 2019-10-31 PROCEDURE — 97116 GAIT TRAINING THERAPY: CPT

## 2019-10-31 PROCEDURE — 97110 THERAPEUTIC EXERCISES: CPT

## 2019-10-31 PROCEDURE — 63600175 PHARM REV CODE 636 W HCPCS: Performed by: HOSPITALIST

## 2019-10-31 RX ORDER — ASPIRIN 325 MG
325 TABLET, DELAYED RELEASE (ENTERIC COATED) ORAL DAILY
Refills: 0
Start: 2019-10-31 | End: 2020-10-30

## 2019-10-31 RX ORDER — ATORVASTATIN CALCIUM 40 MG/1
40 TABLET, FILM COATED ORAL NIGHTLY
Qty: 30 TABLET | Refills: 0 | Status: SHIPPED | OUTPATIENT
Start: 2019-10-31 | End: 2020-06-28

## 2019-10-31 RX ORDER — CLOPIDOGREL BISULFATE 75 MG/1
75 TABLET ORAL DAILY
Qty: 21 TABLET | Refills: 0 | Status: ON HOLD | OUTPATIENT
Start: 2019-10-31 | End: 2020-08-10 | Stop reason: HOSPADM

## 2019-10-31 RX ADMIN — INFLUENZA A VIRUS A/MICHIGAN/45/2015 X-275 (H1N1) ANTIGEN (FORMALDEHYDE INACTIVATED), INFLUENZA A VIRUS A/SINGAPORE/INFIMH-16-0019/2016 IVR-186 (H3N2) ANTIGEN (FORMALDEHYDE INACTIVATED), AND INFLUENZA B VIRUS B/MARYLAND/15/2016 BX-69A (A B/COLORADO/6/2017-LIKE VIRUS) ANTIGEN (FORMALDEHYDE INACTIVATED) 0.5 ML: 60; 60; 60 INJECTION, SUSPENSION INTRAMUSCULAR at 04:10

## 2019-10-31 RX ADMIN — ASPIRIN 325 MG: 325 TABLET, DELAYED RELEASE ORAL at 08:10

## 2019-10-31 RX ADMIN — HYDROCHLOROTHIAZIDE 12.5 MG: 12.5 TABLET ORAL at 08:10

## 2019-10-31 NOTE — PROGRESS NOTES
Allison from [facility] called with provider HHC:  [facility]..Tereza Dang RN, BSN, STN CCM  10/31/2019  '

## 2019-10-31 NOTE — PLAN OF CARE
10:53 AM TN informed telemetry nurse Zena that [facility] is [facility].     10/31/19 1054   Final Note   Assessment Type Final Discharge Note   Anticipated Discharge Disposition Home-Health   What phone number can be called within the next 1-3 days to see how you are doing after discharge?   (see chart)   Hospital Follow Up  Appt(s) scheduled? Yes   Discharge plans and expectations educations in teach back method with documentation complete? Yes   Right Care Referral Info   Referral Type Home health    Facility Name [facility]   Tereza Dang RN, BSN, STN [facility]  10/31/2019

## 2019-10-31 NOTE — NURSING
Discharge Preparation:   Note that patient awake, alert and fully oriented with daughter at bedside;   Family ready for discharge;     Case Management coordination complete;     IV and telemetry monitor removed; No bleeding to IV site;  Patient denies pain;    Flu vaccine given;    Reviewed discharge plan with family and they voiced understanding of same;     Will contact Hospital Transport for w/c to parking garage;

## 2019-10-31 NOTE — PT/OT/SLP PROGRESS
"Speech Language Pathology Treatment    Patient Name:  Lucina Villalpando   MRN:  [id number]  Admitting Diagnosis: Acute cerebral infarction    Recommendations:                 General Recommendations:  Dysphagia therapy, Speech/language therapy and Cognitive-linguistic therapy  Diet recommendations:  Mechanical soft, Liquid Diet Level: Thin   Aspiration Precautions: 1 bite/sip at a time, Alternating bites/sips, No straws, Small bites/sips and Standard aspiration precautions   General Precautions: Standard, fall, other (see comments)(mechanical soft)  Communication strategies:  none    Subjective     Pt reported she is being discharged today. Pt readily accepted PO trials.    Patient goals: "to go home"    Pain/Comfort:  · Pain Rating 1: 0/10    Objective:     Has the patient been evaluated by SLP for swallowing?   Yes  Keep patient NPO? No   Current Respiratory Status: room air      Pt accepted and tolerated 4 trials of thin liquids via cup rim swallowing without overt s/s of aspiration. Pt refused soft solids 2/2 not being hungry following lunch.  Pt completed OME x6 with modA given visual /verbal cues.     Assessment:     Lucina Villalpando is a 69 y.o. female with an SLP diagnosis of Dysphagia, Dysarthria and Cognitive-Linguistic Impairment.     Goals:   Multidisciplinary Problems     SLP Goals        Problem: SLP Goal    Goal Priority Disciplines Outcome   SLP Goal     SLP Ongoing, Progressing   Description:  1. Pt will tolerate LRD (dental soft consistency/TL) with no overt s/s of aspiration x3.  2. Pt will demonstrate knowledge/use of safe swallow strategies with min assist with current diet.   3. Pt will perform OMEs to improve oral motor function with mod assist.    4. Pt will answer problem solving and safety awareness questions with 85% acc no cues.   5. Pt will answer simple, functional money and time management questions with 85% acc min cues.   6. Pt will complete thought organization activities with 85% acc " min cues.   7. Pt will recall functional information after a 5+ minute delay given no cues.   8. Pt will participate in further assessment of motor speech with updated goals to follow.                       Plan:     · Patient to be seen:  3 x/week   · Plan of Care expires:  11/08/19  · Plan of Care reviewed with:  patient   · SLP Follow-Up:  Yes       Discharge recommendations:  (Ongoing ST s/p discharge)   Barriers to Discharge:  None    Time Tracking:     SLP Treatment Date:   10/31/19  Speech Start Time:  1329  Speech Stop Time:  1344     Speech Total Time (min):  15 min    Billable Minutes: Treatment Swallowing Dysfunction 15 min    Carmen Reynoso CCC-SLP  10/31/2019

## 2019-10-31 NOTE — PLAN OF CARE

## 2019-10-31 NOTE — NURSING
Report received from JONATAN Nelson. Patient in bed awake and alert. Appears to be no apparent distress noted.PAin free as claimed. Family at bedside. All safety and fall precautions maintained. Bed in lowest position, bed locked and alarms set, call light within reach. Will continue to monitor.

## 2019-10-31 NOTE — PROGRESS NOTES
TN resent home health orders through [facility] as requested.Tereza Dang, RN, BSN, STN CCM  10/31/2019

## 2019-10-31 NOTE — NURSING
"Note that patient awake, alert and fully oriented; Denies pain; Patient aware that she has been discharged from the hospital and asking,"can I stay until 4:00 pm, my daughter will come and get me then."    Up to the bedside commode with one-person assistance; Hesitancy with urinating; Voided <30 cc urine;     Consumed 50% of breakfast tray;    Will continue to f/u and prepare for d/c;   "

## 2019-10-31 NOTE — PROGRESS NOTES
[facility] WRITTEN HEALTHCARE AND DISCHARGE INFORMATION.  Follow-up Information     Alexia Cervantes MD On 11/4/2019.    Specialty:  Internal Medicine  Why:  out patient services: 1:30 PM follow up from the hospital  Contact information:  [address]  [place] [address] [phone]             [facility] On 11/1/2019.    Why:  Home Health  Contact information:  WILL CALL PATIENT WITH NAME OF HOME HEALTH PROVIDER             patient has PCA  attendant that comes to the house.        [facility] at Home           [phone]  After discharge for assistance [facility] On Call Nurse Care Line 24/7  Assistance   Things You are responsible For To Manage Your Care At Home:  1.    Getting your prescriptions filled   2.    Taking your medications as directed, DO NOT MISS ANY DOSES!  3.    Going to your follow-up doctor appointment. This is important because it  allow the doctor to monitor your progress and determine if  any changes need to made to your treatment plan.   Thank you for choosing [facility] for your care.  Please answer any calls you may receive from [facility] we want to continue to support you as you manage your healthcare needs. [facility] happy to have the opportunity to serve you.    Sincerely,  Your [facility] Team,  Tereza Dang RN, BSN, STN [facility]  10/31/2019  [phone]

## 2019-10-31 NOTE — PLAN OF CARE
10/31/19 1110   Post-Acute Status   Post-Acute Authorization Home Health/Hospice   Home Health/Hospice Status Set-up Complete   Discharge Delays None known at this time   [facility]..Tereza Dang RN, BSN, STN CCM  10/31/2019

## 2019-10-31 NOTE — PLAN OF CARE
10/31/19 1106   Post-Acute Status   Home Health/Hospice Status Authorization Obtained   Tereza Dang RN, BSN, STN [facility]  10/31/2019

## 2019-10-31 NOTE — PLAN OF CARE
10/31/19 1111   Final Note   Assessment Type Final Discharge Note   Anticipated Discharge Disposition Home-Health   Hospital Follow Up  Appt(s) scheduled? Yes   Discharge plans and expectations educations in teach back method with documentation complete? Yes   Right Care Referral Info   Referral Type home health   Facility Name [facility]

## 2019-10-31 NOTE — PROGRESS NOTES
TN called pt's daughter, Yuliya [phone], to inform that pt is being discharged today.  Awaiting N home health provider designation.  PCP appt scheduled..Tereza Dang RN, BSN, [facility]  10/31/2019

## 2019-10-31 NOTE — PLAN OF CARE
Home health orders sent to [facility].     10/31/19 0943   Post-Acute Status   Post-Acute Authorization Home Health/Hospice   Home Health/Hospice Status Pending Payor Review   Discharge Delays None known at this time   Tereza Dang RN, BSN, STN CCM  10/31/2019

## 2019-10-31 NOTE — PLAN OF CARE
Problem: SLP Goal  Goal: SLP Goal  Description  1. Pt will tolerate LRD (dental soft consistency/TL) with no overt s/s of aspiration x3.  2. Pt will demonstrate knowledge/use of safe swallow strategies with min assist with current diet.   3. Pt will perform OMEs to improve oral motor function with mod assist.    4. Pt will answer problem solving and safety awareness questions with 85% acc no cues.   5. Pt will answer simple, functional money and time management questions with 85% acc min cues.   6. Pt will complete thought organization activities with 85% acc min cues.   7. Pt will recall functional information after a 5+ minute delay given no cues.   8. Pt will participate in further assessment of motor speech with updated goals to follow.      Outcome: Ongoing, Progressing   Tolerating current diet & thin liquids safely.

## 2019-10-31 NOTE — NURSING
I was called by the tech Anastasia that patient reports of sudden dizziness while going to the bathroom. V/S and BG checked WNL. After a while patient claimed she was fine. Grandson at bedside. Patient stated maybe she was just excited to see her grandson who visited him tonight. This complaints of dizziness had happened to her before when she fell at home as claimed. Will continue to observe.

## 2019-10-31 NOTE — PLAN OF CARE
10/31/19 1044   Medicare Message   Important Message from Medicare regarding Discharge Appeal Rights Given to patient/caregiver;Signed/date by patient/caregiver;Explained to patient/caregiver   Date IMM was signed 10/31/19   Time IMM was signed 1044   Tereza Dang RN, BSN, STN [facility]  10/31/2019

## 2019-10-31 NOTE — PT/OT/SLP PROGRESS
"Physical Therapy Treatment    Patient Name:  Lucina Villalpando   MRN:  [id number]    Recommendations:     Discharge Recommendations:  home health PT(with family assistance)   Discharge Equipment Recommendations: none   Barriers to discharge: Decreased caregiver support    Assessment:     Lucina Villalpando is a 69 y.o. female admitted with a medical diagnosis of Acute cerebral infarction.  She presents with the following impairments/functional limitations:  weakness, impaired endurance, impaired functional mobilty, gait instability, impaired balance, decreased safety awareness .    Rehab Prognosis: F+; patient would benefit from acute skilled PT services to address these deficits and reach maximum level of function.    Recent Surgery: * No surgery found *      Plan:     During this hospitalization, patient to be seen 5 x/week to address the identified rehab impairments via gait training, therapeutic exercises, therapeutic activities and progress toward the following goals:    · Plan of Care Expires:  11/12/19    Subjective     Chief Complaint: "I wanna stay at my own house."  Patient/Family Comments/goals: to go home  Pain/Comfort:  · Pain Rating 1: 0/10      Objective:     Communicated with nurse  prior to session.  Patient found supine with telemetry upon PT entry to room.     General Precautions: Standard, fall   Orthopedic Precautions:N/A   Braces: N/A     Functional Mobility:  · Bed Mobility:     · Rolling Left:  supervision  · Scooting: supervision  · Supine to Sit: supervision  · Transfers:     · Sit to Stand:  contact guard assistance with rollator  · Bed to Chair: contact guard assistance with  rollator  using  Step Transfer  · Gait: 150 ft with rollator with cga with vcs to stay close to rollator      AM-PAC 6 CLICK MOBILITY  Turning over in bed (including adjusting bedclothes, sheets and blankets)?: 4  Sitting down on and standing up from a chair with arms (e.g., wheelchair, bedside commode, etc.): 4  Moving " from lying on back to sitting on the side of the bed?: 4  Moving to and from a bed to a chair (including a wheelchair)?: 3  Need to walk in hospital room?: 3  Climbing 3-5 steps with a railing?: 3  Basic Mobility Total Score: 21       Therapeutic Activities and Exercises:   BLE TE seaed x 10 to include LAQ, hip flexion, hs, hr, tt, abd/add squeezes    Patient left up in chair with call button in reach and nurse notified..    GOALS:   Multidisciplinary Problems     Physical Therapy Goals        Problem: Physical Therapy Goal    Goal Priority Disciplines Outcome Goal Variances Interventions   Physical Therapy Goal     PT, PT/OT Ongoing, Progressing     Description:  Goals to be met by: 19     Patient will increase functional independence with mobility by performin. Supine to sit with Modified White  2. Rolling to Left and Right with Modified White  3. Sit to stand transfer with Modified White  4. Bed to chair transfer with Modified White   5. Gait 200 feet with Modified White using Rollator   6. Upper/Lower extremity exercise program 3 sets x10 reps per handout, with independence                       Time Tracking:     PT Received On: 10/31/19  PT Start Time: 1525     PT Stop Time: 1555  PT Total Time (min): 30 min     Billable Minutes: Gait Training 10 and Therapeutic Exercise 20    Treatment Type: Treatment  PT/PTA: PTA     PTA Visit Number: 1     Joe Manuel PTA  10/31/2019

## 2019-10-31 NOTE — PLAN OF CARE
Problem: Fall Injury Risk  Goal: Absence of Fall and Fall-Related Injury  Outcome: Ongoing, Progressing     Problem: Adult Inpatient Plan of Care  Goal: Plan of Care Review  Outcome: Ongoing, Progressing  Goal: Patient-Specific Goal (Individualization)  Outcome: Ongoing, Progressing  Goal: Absence of Hospital-Acquired Illness or Injury  Outcome: Ongoing, Progressing  Goal: Optimal Comfort and Wellbeing  Outcome: Ongoing, Progressing  Goal: Readiness for Transition of Care  Outcome: Ongoing, Progressing  Goal: Rounds/Family Conference  Outcome: Ongoing, Progressing     Problem: Diabetes Comorbidity  Goal: Blood Glucose Level Within Desired Range  Outcome: Ongoing, Progressing     Problem: Skin Injury Risk Increased  Goal: Skin Health and Integrity  Outcome: Ongoing, Progressing   No acute events overnight. No falls or new any skin injury noted. Pain free as claimed. Denies dizziness/headache, chest pain and SOB. Patient able to sleep in long intervals as claimed. Urine specimen sent for toxicology. No further complaints made.

## 2019-10-31 NOTE — PROGRESS NOTES
TN taught Symptoms and Problems for CVA home care with pt and with teach back:  1. WEAKNESS IN ONE SIDE, 2. SLURRED SPEECH, 3. HEADACHE. TN placed education sheet in [employer]..       Help at home will be from Yuliya lowe, assisting in pt's recovery.     TN taught patient about things SHE is responsible for when discharged TO HELP WITH HIS RECOVERY:  Particularly on how to Manage HER Care At Home:  1. Getting her prescriptions filled.  2. Taking her medications as directed. DO NOT MISS ANY DOSES!  3. Going to her follow-up doctor appointments.   .  Tereza Dang RN, BSN, STN CCM @[facility]

## 2019-10-31 NOTE — PLAN OF CARE
Problem: Physical Therapy Goal  Goal: Physical Therapy Goal  Description  Goals to be met by: 19     Patient will increase functional independence with mobility by performin. Supine to sit with Modified [place]  2. Rolling to Left and Right with Modified [place]  3. Sit to stand transfer with Modified [place]  4. Bed to chair transfer with Modified [place]   5. Gait 200 feet with Modified [place] using Rollator   6. Upper/Lower extremity exercise program 3 sets x10 reps per handout, with independence      10/31/2019 1601 by Joe Manuel, PTA  Outcome: Ongoing, Progressing  10/31/2019 1601 by Joe Manuel, PTA  Pt amb 150 ft with rollator with CGA. Suo to sit with S

## 2019-10-31 NOTE — PLAN OF CARE
[facility]    HOME HEALTH ORDERS  FACE TO FACE ENCOUNTER    Patient Name: Lucina Villalpando  YOB: 1950    PCP: Johan Harrison MD   PCP Address: [address] [place] [address]  PCP Phone Number: [phone]  PCP Fax: [phone]    Encounter Date: 10/31/2019    Admit to Home Health    Diagnoses:  Active Hospital Problems    Diagnosis  POA    *Acute cerebral infarction [I63.9]  Yes     1. Acute infarction in the right ruth ann-kyree.  Changes of chronic small vessel ischemic disease.  Old infarction in the left parietal and occipital lobes.      Tobacco abuse [Z72.0]  Yes    Type 2 diabetes mellitus with neurologic complication [E11.49]  Yes    H/O: CVA (cerebrovascular accident) [Z86.73]  Not Applicable    Essential hypertension [I10]  Yes      Resolved Hospital Problems   No resolved problems to display.       No future appointments.  Follow-up Information     Johan Harrison MD In 1 week.    Specialty:  Internal Medicine  Contact information:  [address] [place], [place] [id number] [phone]                     I have seen and examined this patient face to face today. My clinical findings that support the need for the home health skilled services and home bound status are the following:  Weakness/numbness causing balance and gait disturbance due to Stroke and Weakness/Debility making it taxing to leave home.    Allergies:Review of patient's allergies indicates:  No Known Allergies    Diet: diabetic diet: 2000 caloriemechanical soft     Activities: activity as tolerated    Nursing:   SN to complete comprehensive assessment including routine vital signs. Instruct on disease process and s/s of complications to report to MD. Review/verify medication list sent home with the patient at time of discharge  and instruct patient/caregiver as needed. Frequency may be adjusted depending on start of care date.    Notify MD if SBP > 160 or < 90; DBP > 90  or < 50; HR > 120 or < 50; Temp > 101; Other:         CONSULTS:    Physical Therapy to evaluate and treat. Evaluate for home safety and equipment needs; Establish/upgrade home exercise program. Perform / instruct on therapeutic exercises, gait training, transfer training, and Range of Motion.  Occupational Therapy to evaluate and treat. Evaluate home environment for safety and equipment needs. Perform/Instruct on transfers, ADL training, ROM, and therapeutic exercises.    MISCELLANEOUS CARE:  Routine Skin for Bedridden Patients: Instruct patient/caregiver to apply moisture barrier cream to all skin folds and wet areas in perineal area daily and after baths and all bowel movements.    WOUND CARE ORDERS  n/a    Medications: Review discharge medications with patient and family and provide education.      I certify that this patient is confined to her home and needs intermittent skilled nursing care, physical therapy and occupational therapy.

## 2019-10-31 NOTE — DISCHARGE SUMMARY
[facility] Hospital Medicine  Discharge Summary      Patient Name: Lucina Villalpando  MRN: [id number]  Admission Date: 10/28/2019  Hospital Length of Stay: 3 days  Discharge Date and Time:  10/31/2019 10:44 AM  Attending Physician: Emily Page MD   Discharging Provider: Emily Page MD  Primary Care Provider: Johan Harrison MD      HPI:     Lucina Villalpando is a 69 y.o. female that (in part)  has a past medical history of Arthritis, Depression, Diabetes mellitus, Fibromyalgia, Hypertension, and Stroke.  has a past surgical history that includes Knee surgery and Back surgery. Presents to [facility] Emergency Department complaining of slurred speech associated with altered mental status.  Last Ativan normal at approximately 12:30 a.m. on 10/27.  From Saturday the patient daughter found her on the floor and she was unable to speak.  EMS was activated.  When ambulance arrived the patient was asymptomatic and she refused further evaluation in the emergency department.  However over rated the weekend the base daughter about she was still experiencing insert speech and was having what sounds like aspiration while eating.  She is cyst and she come in for further evaluation.  She was experiencing drooling as well.  Remainder of the history is otherwise limited due to the current condition of the patient as well as her level of cooperation at this time.    In the emergency department routine laboratory studies and head CT was performed.  There is questionable at evidence of an age-indeterminate infarction.  MRI was performed which did confirm an acute infarction of the right ruth ann-kyree.  Patient well outside the window for tPA.  Stroke protocol and management initiated.  Neurology consulted.    Hospital medicine has been asked to admit for further evaluation and treatment.     * No surgery found *      Hospital Course:     Lucina Villalpando is a 69 y.o. female that  (in part)  has a past medical history of Arthritis, Depression, Diabetes mellitus, Fibromyalgia, Hypertension, and Stroke.  has a past surgical history that includes Knee surgery and Back surgery. Presents to [facility] Emergency Department complaining of slurred speech associated with altered mental status.  Last seen normal at approximately 12:30 a.m. on 10/27.  From Saturday the patient daughter found her on the floor and she was unable to speak.  EMS was activated.  When ambulance arrived the patient was asymptomatic and she refused further evaluation in the emergency department.  However over  the weekend the base daughter about she was still experiencing slurred  speech and was having what sounds like aspiration while eating.she come in for further evaluation.  She was experiencing drooling as well.  Remainder of the history is otherwise was limited due to the current condition of the patient as well as her level of cooperation,  In the emergency department routine laboratory studies and head CT was performed.  There is questionable at evidence of an age-indeterminate infarction.  MRI was performed which did confirm an acute infarction of the right ruth ann-kyree.  Patient well outside the window for tPA.  Stroke protocol and management initiated.  Neurology consulted.PT,OT,ST is consulted.  Was started on on Plavix,ASA,statin,  MRA brain and carotid doppler are unremarkable,  initially was on permissive HTN,Started gradually on BP med's.her symptoms improved,was started on diet,ddi well with PT,OT,patient has samaria discharged home with HH,ASA,plavix,statin,instructed quit smoking,will follow up with PCP in next few days.         Consults:   Consults (From admission, onward)        Status Ordering Provider     Inpatient consult to Neurology  Once     Provider:  Cheng Jose MD    Completed ESTEVAN TAYLOR          No new Assessment & Plan notes have been filed under this hospital  service since the last note was generated.  Service: Hospital Medicine    Final Active Diagnoses:    Diagnosis Date Noted POA    PRINCIPAL PROBLEM:  Acute cerebral infarction [I63.9] 10/29/2019 Yes    Tobacco abuse [Z72.0] 10/29/2019 Yes    Type 2 diabetes mellitus with neurologic complication [E11.49] 09/16/2015 Yes    H/O: CVA (cerebrovascular accident) [Z86.73] 09/16/2015 Not Applicable    Essential hypertension [I10] 09/16/2015 Yes      Problems Resolved During this Admission:       Discharged Condition: stable    Disposition: Home-Health Care [facility]    Follow Up:  Follow-up Information     Alexia Cervantes MD On 11/4/2019.    Specialty:  Internal Medicine  Why:  out patient services: 1:30 PM follow up from the hospital  Contact information:  [address] [place] [id number] [phone]             [facility] On 11/1/2019.    Why:  Home Health  Contact information:  WILL CALL PATIENT WITH NAME OF HOME HEALTH PROVIDER               Patient Instructions:      Activity as tolerated       Significant Diagnostic Studies: Labs:   BMP:   Recent Labs   Lab 10/30/19  0451   *      K 4.7   *   CO2 22*   BUN 26*   CREATININE 1.3   CALCIUM 9.9   MG 1.9   , CMP   Recent Labs   Lab 10/30/19  0451      K 4.7   *   CO2 22*   *   BUN 26*   CREATININE 1.3   CALCIUM 9.9   PROT 8.7*   ALBUMIN 3.6   BILITOT 0.3   ALKPHOS 90   AST 16   ALT 9*   ANIONGAP 6*   ESTGFRAFRICA 49*   EGFRNONAA 42*   , CBC   Recent Labs   Lab 10/30/19  0451   WBC 3.02*   HGB 11.2*   HCT 34.2*      , Lipid Panel   Lab Results   Component Value Date    CHOL 244 (H) 10/28/2019    HDL 36 (L) 10/28/2019    LDLCALC 165.8 (H) 10/28/2019    TRIG 211 (H) 10/28/2019    CHOLHDL 14.8 (L) 10/28/2019    and Troponin   Recent Labs   Lab 10/28/19  1841   TROPONINI 0.009     Radiology: X-Ray: CXR: X-Ray Chest 1 View (CXR): No results found for this visit on 10/28/19. and X-Ray Chest PA and Lateral (CXR):  No results found for this visit on 10/28/19.  MRI: brain   Ultrasound:carotid doppler   Cardiac Graphics: Echocardiogram:   2D echo with color flow doppler: No results found for this or any previous visit. and Transthoracic echo (TTE) complete (Cupid Only):   Results for orders placed or performed during the hospital encounter of 10/28/19   Echo   Result Value Ref Range    BSA 1.75 m2    TDI SEPTAL 0.06 m/s    LV LATERAL E/E' RATIO 10.86 m/s    LV SEPTAL E/E' RATIO 12.67 m/s    LA WIDTH 3.41 cm    AORTIC VALVE CUSP SEPERATION 2.07 cm    TDI LATERAL 0.07 m/s    PV PEAK VELOCITY 1.01 cm/s    LVIDD 2.67 (A) 3.5 - 6.0 cm    IVS 1.19 (A) 0.6 - 1.1 cm    PW 1.23 (A) 0.6 - 1.1 cm    Ao root annulus 2.73 cm    LVIDS 1.64 (A) 2.1 - 4.0 cm    FS 39 28 - 44 %    LA volume 39.73 cm3    Sinus 3.05 cm    STJ 2.20 cm    Ascending aorta 2.96 cm    LV mass 94.48 g    LA size 2.85 cm    RVDD 3.03 cm    TAPSE 1.81 cm    RV S' 10.56 cm/s    Left Ventricle Relative Wall Thickness 0.92 cm    AV mean gradient 3 mmHg    AV valve area 1.77 cm2    AV Velocity Ratio 0.76     AV index (prosthetic) 0.71     E/A ratio 1.29     Mean e' 0.07 m/s    E wave decelartion time 334.83 msec    IVRT 0.17 msec    Pulm vein S/D ratio 1.55     LVOT diameter 1.78 cm    LVOT area 2.5 cm2    LVOT peak erickson 0.81 m/s    LVOT peak VTI 16.60 cm    Ao peak erickson 1.07 m/s    Ao VTI 23.27 cm    LVOT stroke volume 41.29 cm3    AV peak gradient 5 mmHg    E/E' ratio 11.69 m/s    MV Peak E Erickson 0.76 m/s    TR Max Erickson 2.76 m/s    MV Peak A Erickson 0.59 m/s    PV Peak S Erickson 0.59 m/s    PV Peak D Erickson 0.38 m/s    LV Systolic Volume 7.60 mL    LV Systolic Volume Index 4.2 mL/m2    LV Diastolic Volume 26.27 mL    LV Diastolic Volume Index 14.67 mL/m2    LA Volume Index 22.2 mL/m2    LV Mass Index 53 g/m2    RA Major Axis 5.21 cm    Left Atrium Minor Axis 4.74 cm    Left Atrium Major Axis 4.88 cm    Triscuspid Valve Regurgitation Peak Gradient 30 mmHg    RA Width 2.30 cm    Right Atrial  Pressure (from IVC) 8 mmHg    TV rest pulmonary artery pressure 38 mmHg    Narrative    · Normal left ventricular systolic function. The estimated ejection   fraction is 65%  · Mild concentric left ventricular hypertrophy.  · No wall motion abnormalities.  · Normal right ventricular systolic function.  · The estimated PA systolic pressure is 38 mm Hg  · No intracardiac source of embolus noted on this exam. If high clinical   suspicion, consider RADHA +/- bubble study.                Pending Diagnostic Studies:     Procedure Component Value Units Date/Time    Vitamin B1 [[id number]] Collected:  10/29/19 0541    Order Status:  Sent Lab Status:  In process Updated:  10/29/19 0638    Specimen:  Blood          Medications:  Reconciled Home Medications:      Medication List      START taking these medications    aspirin 325 MG EC tablet  Commonly known as:  ECOTRIN  Take 1 tablet (325 mg total) by mouth once daily.     atorvastatin 40 MG tablet  Commonly known as:  LIPITOR  Take 1 tablet (40 mg total) by mouth every evening.     clopidogrel 75 mg tablet  Commonly known as:  PLAVIX  Take 1 tablet (75 mg total) by mouth once daily. for 21 days        CONTINUE taking these medications    glipiZIDE 5 MG tablet  Commonly known as:  GLUCOTROL  Take 5 mg by mouth 2 (two) times daily before meals.     lisinopril-hydrochlorothiazide 20-25 mg Tab  Commonly known as:  PRINZIDE,ZESTORETIC  Take 1 tablet by mouth once daily.     naproxen 500 MG tablet  Commonly known as:  NAPROSYN  Take 500 mg by mouth 2 (two) times daily.     ondansetron 8 MG Tbdl  Commonly known as:  ZOFRAN-ODT  Take 1 tablet (8 mg total) by mouth every 8 (eight) hours as needed (Nausea).     oxyCODONE 5 MG immediate release tablet  Commonly known as:  ROXICODONE  Take 1 tablet (5 mg total) by mouth every 6 (six) hours as needed for Pain.     QUEtiapine 25 MG Tab  Commonly known as:  SEROQUEL  Take 25 mg by mouth nightly as needed.     senna-docusate 8.6-50 mg 8.6-50  mg per tablet  Commonly known as:  PERICOLACE  Take 1 tablet by mouth 2 (two) times daily as needed for Constipation.     traMADol 50 mg tablet  Commonly known as:  ULTRAM  Take 50 mg by mouth every 6 (six) hours as needed for Pain.     VITAMIN B-12 1000 MCG tablet  Generic drug:  cyanocobalamin  Take 100 mcg by mouth once daily.        STOP taking these medications    potassium, sodium phosphates 280-160-250 mg Pwpk  Commonly known as:  PHOS-NAK            Indwelling Lines/Drains at time of discharge:   Lines/Drains/Airways     None                 Time spent on the discharge of patient: over 30  minutes  Patient was seen and examined on the date of discharge and determined to be suitable for discharge.         Emily Page MD  Department of Hospital Medicine  [facility]

## 2019-11-01 LAB — VIT B1 BLD-MCNC: 52 UG/L (ref 38–122)

## 2019-11-04 ENCOUNTER — PATIENT OUTREACH (OUTPATIENT)
Dept: ADMINISTRATIVE | Facility: CLINIC | Age: 69
End: 2019-11-04

## 2019-11-04 NOTE — PROGRESS NOTES
C3 nurse attempted to contact patient. The following occurred:   C3 nurse attempted to contact Lucina Villalpando  for a TCC post hospital discharge follow up call. The patient is unable to conduct the call @ this time. The patient requested a callback.    The patient has a scheduled HOSFU appointment with DEVANG SANTOS on 11 4 @ 130. Message sent to Physician staff.

## 2019-12-26 ENCOUNTER — HOSPITAL ENCOUNTER (EMERGENCY)
Facility: HOSPITAL | Age: 69
Discharge: HOME | End: 2019-12-26
Attending: EMERGENCY MEDICINE
Payer: MEDICARE

## 2019-12-26 VITALS
HEIGHT: 71 IN | BODY MASS INDEX: 18.9 KG/M2 | DIASTOLIC BLOOD PRESSURE: 63 MMHG | SYSTOLIC BLOOD PRESSURE: 129 MMHG | TEMPERATURE: 99 F | OXYGEN SATURATION: 100 % | WEIGHT: 135 LBS | HEART RATE: 86 BPM | RESPIRATION RATE: 18 BRPM

## 2019-12-26 DIAGNOSIS — N39.0 URINARY TRACT INFECTION WITHOUT HEMATURIA, SITE UNSPECIFIED: Primary | ICD-10-CM

## 2019-12-26 DIAGNOSIS — R00.0 TACHYCARDIA: ICD-10-CM

## 2019-12-26 LAB
ALBUMIN SERPL BCP-MCNC: 3.8 G/DL (ref 3.5–5.2)
ALP SERPL-CCNC: 87 U/L (ref 55–135)
ALT SERPL W/O P-5'-P-CCNC: 36 U/L (ref 10–44)
ANION GAP SERPL CALC-SCNC: 8 MMOL/L (ref 8–16)
AST SERPL-CCNC: 39 U/L (ref 10–40)
BACTERIA #/AREA URNS HPF: ABNORMAL /HPF
BASOPHILS # BLD AUTO: 0.02 K/UL (ref 0–0.2)
BASOPHILS NFR BLD: 0.3 % (ref 0–1.9)
BILIRUB SERPL-MCNC: 0.4 MG/DL (ref 0.1–1)
BILIRUB UR QL STRIP: NEGATIVE
BUN SERPL-MCNC: 33 MG/DL (ref 8–23)
CALCIUM SERPL-MCNC: 10.2 MG/DL (ref 8.7–10.5)
CHLORIDE SERPL-SCNC: 108 MMOL/L (ref 95–110)
CLARITY UR: ABNORMAL
CO2 SERPL-SCNC: 22 MMOL/L (ref 23–29)
COLOR UR: YELLOW
CREAT SERPL-MCNC: 1.3 MG/DL (ref 0.5–1.4)
CTP QC/QA: YES
DIFFERENTIAL METHOD: ABNORMAL
EOSINOPHIL # BLD AUTO: 0 K/UL (ref 0–0.5)
EOSINOPHIL NFR BLD: 0.3 % (ref 0–8)
ERYTHROCYTE [DISTWIDTH] IN BLOOD BY AUTOMATED COUNT: 14 % (ref 11.5–14.5)
EST. GFR  (AFRICAN AMERICAN): 49 ML/MIN/1.73 M^2
EST. GFR  (NON AFRICAN AMERICAN): 42 ML/MIN/1.73 M^2
GLUCOSE SERPL-MCNC: 143 MG/DL (ref 70–110)
GLUCOSE UR QL STRIP: NEGATIVE
HCT VFR BLD AUTO: 32.5 % (ref 37–48.5)
HGB BLD-MCNC: 10.2 G/DL (ref 12–16)
HGB UR QL STRIP: ABNORMAL
HYALINE CASTS #/AREA URNS LPF: 0 /LPF
IMM GRANULOCYTES # BLD AUTO: 0.03 K/UL (ref 0–0.04)
IMM GRANULOCYTES NFR BLD AUTO: 0.5 % (ref 0–0.5)
KETONES UR QL STRIP: NEGATIVE
LACTATE SERPL-SCNC: 1.6 MMOL/L (ref 0.5–2.2)
LEUKOCYTE ESTERASE UR QL STRIP: ABNORMAL
LYMPHOCYTES # BLD AUTO: 1 K/UL (ref 1–4.8)
LYMPHOCYTES NFR BLD: 16.5 % (ref 18–48)
MCH RBC QN AUTO: 27.1 PG (ref 27–31)
MCHC RBC AUTO-ENTMCNC: 31.4 G/DL (ref 32–36)
MCV RBC AUTO: 86 FL (ref 82–98)
MICROSCOPIC COMMENT: ABNORMAL
MONOCYTES # BLD AUTO: 0.3 K/UL (ref 0.3–1)
MONOCYTES NFR BLD: 4.9 % (ref 4–15)
NEUTROPHILS # BLD AUTO: 4.6 K/UL (ref 1.8–7.7)
NEUTROPHILS NFR BLD: 77.5 % (ref 38–73)
NITRITE UR QL STRIP: NEGATIVE
NRBC BLD-RTO: 0 /100 WBC
PH UR STRIP: 5 [PH] (ref 5–8)
PLATELET # BLD AUTO: 204 K/UL (ref 150–350)
PMV BLD AUTO: 11.1 FL (ref 9.2–12.9)
POC MOLECULAR INFLUENZA A AGN: NEGATIVE
POC MOLECULAR INFLUENZA B AGN: NEGATIVE
POCT GLUCOSE: 138 MG/DL (ref 70–110)
POTASSIUM SERPL-SCNC: 4.6 MMOL/L (ref 3.5–5.1)
PROT SERPL-MCNC: 9.1 G/DL (ref 6–8.4)
PROT UR QL STRIP: ABNORMAL
RBC # BLD AUTO: 3.77 M/UL (ref 4–5.4)
RBC #/AREA URNS HPF: 2 /HPF (ref 0–4)
SODIUM SERPL-SCNC: 138 MMOL/L (ref 136–145)
SP GR UR STRIP: 1.02 (ref 1–1.03)
SQUAMOUS #/AREA URNS HPF: 10 /HPF
URN SPEC COLLECT METH UR: ABNORMAL
UROBILINOGEN UR STRIP-ACNC: ABNORMAL EU/DL
WBC # BLD AUTO: 5.94 K/UL (ref 3.9–12.7)
WBC #/AREA URNS HPF: 10 /HPF (ref 0–5)

## 2019-12-26 PROCEDURE — 99285 EMERGENCY DEPT VISIT HI MDM: CPT | Mod: 25

## 2019-12-26 PROCEDURE — 80053 COMPREHEN METABOLIC PANEL: CPT

## 2019-12-26 PROCEDURE — 87040 BLOOD CULTURE FOR BACTERIA: CPT | Mod: 59

## 2019-12-26 PROCEDURE — 63600175 PHARM REV CODE 636 W HCPCS: Performed by: EMERGENCY MEDICINE

## 2019-12-26 PROCEDURE — 25000003 PHARM REV CODE 250: Performed by: NURSE PRACTITIONER

## 2019-12-26 PROCEDURE — 82962 GLUCOSE BLOOD TEST: CPT

## 2019-12-26 PROCEDURE — 81000 URINALYSIS NONAUTO W/SCOPE: CPT

## 2019-12-26 PROCEDURE — 96365 THER/PROPH/DIAG IV INF INIT: CPT

## 2019-12-26 PROCEDURE — 93005 ELECTROCARDIOGRAM TRACING: CPT

## 2019-12-26 PROCEDURE — 25000003 PHARM REV CODE 250: Performed by: EMERGENCY MEDICINE

## 2019-12-26 PROCEDURE — 87502 INFLUENZA DNA AMP PROBE: CPT

## 2019-12-26 PROCEDURE — 93010 EKG 12-LEAD: ICD-10-PCS | Mod: ,,, | Performed by: INTERNAL MEDICINE

## 2019-12-26 PROCEDURE — 93010 ELECTROCARDIOGRAM REPORT: CPT | Mod: ,,, | Performed by: INTERNAL MEDICINE

## 2019-12-26 PROCEDURE — 85025 COMPLETE CBC W/AUTO DIFF WBC: CPT

## 2019-12-26 PROCEDURE — 83605 ASSAY OF LACTIC ACID: CPT

## 2019-12-26 PROCEDURE — 51701 INSERT BLADDER CATHETER: CPT

## 2019-12-26 RX ORDER — ACETAMINOPHEN 325 MG/1
650 TABLET ORAL
Status: COMPLETED | OUTPATIENT
Start: 2019-12-26 | End: 2019-12-26

## 2019-12-26 RX ORDER — ACETAMINOPHEN 325 MG/1
325 TABLET ORAL
Status: COMPLETED | OUTPATIENT
Start: 2019-12-26 | End: 2019-12-26

## 2019-12-26 RX ORDER — CEPHALEXIN 500 MG/1
500 CAPSULE ORAL EVERY 12 HOURS
Qty: 14 CAPSULE | Refills: 0 | Status: SHIPPED | OUTPATIENT
Start: 2019-12-26 | End: 2020-01-02

## 2019-12-26 RX ORDER — ACETAMINOPHEN 500 MG
1000 TABLET ORAL EVERY 8 HOURS PRN
Qty: 30 TABLET | Refills: 0 | Status: ON HOLD | OUTPATIENT
Start: 2019-12-26 | End: 2020-06-29 | Stop reason: HOSPADM

## 2019-12-26 RX ADMIN — CEFTRIAXONE 2 G: 2 INJECTION, SOLUTION INTRAVENOUS at 05:12

## 2019-12-26 RX ADMIN — ACETAMINOPHEN 325 MG: 325 TABLET ORAL at 07:12

## 2019-12-26 RX ADMIN — ACETAMINOPHEN 650 MG: 325 TABLET ORAL at 05:12

## 2019-12-26 NOTE — ED PROVIDER NOTES
Encounter Date: 12/26/2019    SCRIBE #1 NOTE: I, Salvador Bazan, am scribing for, and in the presence of,  Priyanka Bazan MD. I have scribed the following portions of the note - the EKG reading. Other sections scribed: HPI, ROS, PE, DD.       History     Chief Complaint   Patient presents with    Chills     Pt states she is freezing and has felt very cold that got worse today. Pt shivering with triage today.      69 y.o F with a hx of COPD, DM, HTN and Stroke (10/27/19) presents to the ED accompanied by her daughter c/o acute onset of chills which began today. She also reports a productive cough with white sputum and SOB. She also c/o dysuria, urinary frequency, urgency and difficulty urinating since yesterday. The pt's daughter does report recurrent UTI's. She did receive an annual influenza vaccination. The pt does have a nurse x1/week and a caretaker x7 days/ week at home. She denies abdominal pain, nausea, emesis, diarrhea, chest pain and any other associated symptoms. No prior tx.    PCP- Alexia Cervantes MD      The history is provided by the patient and a relative.     Review of patient's allergies indicates:  No Known Allergies  Past Medical History:   Diagnosis Date    Arthritis     Depression     Diabetes mellitus     Fibromyalgia     Hypertension     Stroke      Past Surgical History:   Procedure Laterality Date    BACK SURGERY      2 x for ruptured disc    KNEE SURGERY      right knee-meniscus     Family History   Problem Relation Age of Onset    Diabetes Mother     Diabetes Sister      Social History     Tobacco Use    Smoking status: Current Every Day Smoker     Packs/day: 1.00     Years: 46.00     Pack years: 46.00     Types: Cigarettes    Smokeless tobacco: Never Used   Substance Use Topics    Alcohol use: Yes     Comment: socially    Drug use: Yes     Types: Marijuana     Comment: weekly     Review of Systems   Constitutional: Positive for chills.   HENT: Negative for congestion and  sore throat.    Eyes: Negative for visual disturbance.   Respiratory: Negative for cough and shortness of breath.    Cardiovascular: Negative for chest pain.   Gastrointestinal: Negative for abdominal pain, nausea and vomiting.   Genitourinary: Positive for difficulty urinating, dysuria, frequency and urgency. Negative for vaginal discharge.   Skin: Negative for rash.   Allergic/Immunologic: Negative for immunocompromised state.   Neurological: Negative for headaches.       Physical Exam     Initial Vitals [12/26/19 1620]   BP Pulse Resp Temp SpO2   (!) 148/72 (!) 116 20 (!) 100.7 °F (38.2 °C) 95 %      MAP       --         Physical Exam    Nursing note and vitals reviewed.  Constitutional: She is not diaphoretic. No distress.   HENT:   Head: Normocephalic and atraumatic.   Mouth/Throat: Oropharynx is clear and moist. Mucous membranes are dry.   Eyes: EOM are normal. Pupils are equal, round, and reactive to light. No scleral icterus.   Neck: Normal range of motion. Neck supple. No JVD present.   Cardiovascular: Normal rate, regular rhythm and intact distal pulses.   Pulmonary/Chest: Breath sounds normal. No stridor. No respiratory distress.   Abdominal: Soft. Bowel sounds are normal. She exhibits no distension. There is tenderness in the suprapubic area. There is no rebound and no guarding.   Musculoskeletal: Normal range of motion. She exhibits no edema or tenderness.   Neurological: She is alert. She has normal strength. No cranial nerve deficit or sensory deficit. GCS score is 15. GCS eye subscore is 4. GCS verbal subscore is 5. GCS motor subscore is 6.   Skin: Skin is warm and dry.   Psychiatric: She has a normal mood and affect.         ED Course   Procedures  Labs Reviewed   CBC W/ AUTO DIFFERENTIAL - Abnormal; Notable for the following components:       Result Value    RBC 3.77 (*)     Hemoglobin 10.2 (*)     Hematocrit 32.5 (*)     Mean Corpuscular Hemoglobin Conc 31.4 (*)     Gran% 77.5 (*)     Lymph% 16.5  (*)     All other components within normal limits   COMPREHENSIVE METABOLIC PANEL - Abnormal; Notable for the following components:    CO2 22 (*)     Glucose 143 (*)     BUN, Bld 33 (*)     Total Protein 9.1 (*)     eGFR if  49 (*)     eGFR if non  42 (*)     All other components within normal limits   URINALYSIS, REFLEX TO URINE CULTURE - Abnormal; Notable for the following components:    Appearance, UA Hazy (*)     Protein, UA 2+ (*)     Occult Blood UA 1+ (*)     Urobilinogen, UA 2.0-3.0 (*)     Leukocytes, UA 2+ (*)     All other components within normal limits    Narrative:     Preferred Collection Type->Urine, Catheterized   URINALYSIS MICROSCOPIC - Abnormal; Notable for the following components:    WBC, UA 10 (*)     Bacteria Moderate (*)     All other components within normal limits    Narrative:     Preferred Collection Type->Urine, Catheterized   POCT GLUCOSE - Abnormal; Notable for the following components:    POCT Glucose 138 (*)     All other components within normal limits   CULTURE, BLOOD   CULTURE, BLOOD   LACTIC ACID, PLASMA   LACTIC ACID, PLASMA   POCT INFLUENZA A/B MOLECULAR   POCT GLUCOSE MONITORING CONTINUOUS     EKG Readings: (Independently Interpreted)   Initial Reading: No STEMI. Rhythm: Sinus Tachycardia. Heart Rate: 104 bpm. Conduction: Normal. ST Segments: Normal ST Segments. T Waves: Normal. Axis: Normal.   Irregular Baseline        Imaging Results          X-Ray Chest AP Portable (Final result)  Result time 12/26/19 19:33:09    Final result by Tabatha Pascual MD (12/26/19 19:33:09)                 Impression:      Prominence of the AP window.  Findings may suggest pulmonary arterial hypertension.    Haziness of the lung parenchyma, which may represent edema or infiltrates.      Electronically signed by: Tabatha Pascual  Date:    12/26/2019  Time:    19:33             Narrative:    EXAMINATION:  AP PORTABLE CHEST    CLINICAL  HISTORY:  Fever;    TECHNIQUE:  AP portable chest radiograph was submitted.    COMPARISON:  10/28/2019    FINDINGS:  AP portable chest radiograph demonstrates a cardiac silhouette at the upper limits of normal. There is prominence at the AP window.  There is hazy appearance of the lung parenchyma.  There is no focal consolidation, pneumothorax, or pleural effusion.                                 Medical Decision Making:   History:   Old Medical Records: I decided to obtain old medical records.  Initial Assessment:   Admitted for CVA in 10/2019.  Differential Diagnosis:   My differential diagnosis includes, but is not limited to:   UTI, Pyelonephritis, URI, Pneumonia, Bronchitis and Influenza   Independently Interpreted Test(s):   I have ordered and independently interpreted X-rays - see prior notes.  I have ordered and independently interpreted EKG Reading(s) - see prior notes  Clinical Tests:   Lab Tests: Ordered and Reviewed  Radiological Study: Reviewed and Ordered  Medical Tests: Ordered and Reviewed  ED Management:  Labs without leukocytosis but with 77% granulocytes.  There is no bandemia.  Renal function is consistent with baseline.  Lactic acid is within normal limits. UA suggestive of UTI.  Patient has been given antibiotics in the emergency department.  On reassessment she has resolution of her fever and improvement in her tachycardia.  She is hemodynamically stable and fit for discharge to follow up with her primary physician.  She has been prescribed a course of antibiotics.  Patient and daughter at the bedside counseled on supportive care, appropriate medication usage, concerning symptoms for which to return to ER and the importance of follow up. Understanding and agreement with treatment plan was expressed.   This chart was completed using dictation software, as a result there may be some transcription errors.             Scribe Attestation:   Scribe #1: I performed the above scribed service and the  documentation accurately describes the services I performed. I attest to the accuracy of the note.                          Clinical Impression:       ICD-10-CM ICD-9-CM   1. Urinary tract infection without hematuria, site unspecified N39.0 599.0   2. Tachycardia R00.0 785.0         Disposition:   Disposition: Discharged  Condition: Stable    I, Priyanka Bazan, personally performed the services described in this documentation. All medical record entries made by the scribe were at my direction and in my presence. I have reviewed the chart and agree that the record reflects my personal performance and is accurate and complete.                   Priyanka Bazan MD  12/26/19 2006

## 2019-12-26 NOTE — ED TRIAGE NOTES
69 y.o. Female presents to the Ed with chief complaint of chills. Pt reports x1 week of fever and chills. Pt's daughter reports generalized weakness noted progressively worsening. Pt denies pain. Some SOB noted with hx of COPD

## 2019-12-27 NOTE — DISCHARGE INSTRUCTIONS
Emergency departmentTesting suggest a urinary tract infection as the cause of her symptoms. It is important that you complete the entire course of antibiotics prescribed.  Drink adequate fluids to remain hydrated.  Use Tylenol as needed for fever or body aches.  It is very important that she make an appointment to see your primary physician to monitor your symptoms. Return to the emergency department for fever not improved by medications, uncontrollable nausea vomiting, breathing difficulty not improved or nebulizer treatments or any new, worsening or concerning symptoms.    Thank you for coming to our Emergency Department today. It is important to remember that some problems are difficult to diagnose and may not be found during your first visit. Be sure to follow up with your primary care doctor and review any labs/imaging that was performed with them. If you do not have a primary care doctor, you may contact the one listed on your discharge paperwork or you may also call the [facility] Appointment Desk at [phone] to schedule an appointment with one.     All medications may potentially have side effects and it is impossible to predict which medications may give you side effects. If you feel that you are having a negative effect of any medication you should immediately stop taking them and seek medical attention.    Return to the ER with any questions/concerns, new/concerning symptoms, worsening or failure to improve. Do not drive or make any important decisions for 24 hours if you have received any pain medications, sedatives or mood altering drugs during your ER visit.

## 2019-12-31 LAB
BACTERIA BLD CULT: NORMAL
BACTERIA BLD CULT: NORMAL

## 2020-06-28 ENCOUNTER — HOSPITAL ENCOUNTER (OUTPATIENT)
Facility: HOSPITAL | Age: 70
Discharge: HOME | End: 2020-06-29
Attending: EMERGENCY MEDICINE | Admitting: EMERGENCY MEDICINE
Payer: MEDICARE

## 2020-06-28 DIAGNOSIS — R41.82 ALTERED MENTAL STATUS, UNSPECIFIED ALTERED MENTAL STATUS TYPE: ICD-10-CM

## 2020-06-28 DIAGNOSIS — G45.9 TIA (TRANSIENT ISCHEMIC ATTACK): ICD-10-CM

## 2020-06-28 DIAGNOSIS — R41.82 AMS (ALTERED MENTAL STATUS): ICD-10-CM

## 2020-06-28 DIAGNOSIS — E16.2 HYPOGLYCEMIA: Primary | ICD-10-CM

## 2020-06-28 DIAGNOSIS — R29.898 WEAKNESS OF LEFT LOWER EXTREMITY: ICD-10-CM

## 2020-06-28 DIAGNOSIS — T38.3X1A POISONING BY ORAL SULFONYLUREA DERIVATIVE, ACCIDENTAL OR UNINTENTIONAL, INITIAL ENCOUNTER: ICD-10-CM

## 2020-06-28 PROBLEM — R56.9 SEIZURE: Status: ACTIVE | Noted: 2020-06-28

## 2020-06-28 PROBLEM — N18.9 CKD (CHRONIC KIDNEY DISEASE): Status: ACTIVE | Noted: 2020-06-28

## 2020-06-28 PROBLEM — E11.9 DM (DIABETES MELLITUS): Status: ACTIVE | Noted: 2020-06-28

## 2020-06-28 LAB
ALBUMIN SERPL BCP-MCNC: 3.9 G/DL (ref 3.5–5.2)
ALP SERPL-CCNC: 107 U/L (ref 55–135)
ALT SERPL W/O P-5'-P-CCNC: 44 U/L (ref 10–44)
ANION GAP SERPL CALC-SCNC: 10 MMOL/L (ref 8–16)
AST SERPL-CCNC: 53 U/L (ref 10–40)
BACTERIA #/AREA URNS HPF: ABNORMAL /HPF
BASOPHILS # BLD AUTO: 0.03 K/UL (ref 0–0.2)
BASOPHILS NFR BLD: 0.9 % (ref 0–1.9)
BILIRUB SERPL-MCNC: 0.2 MG/DL (ref 0.1–1)
BILIRUB UR QL STRIP: NEGATIVE
BUN SERPL-MCNC: 32 MG/DL (ref 8–23)
CALCIUM SERPL-MCNC: 9.4 MG/DL (ref 8.7–10.5)
CHLORIDE SERPL-SCNC: 110 MMOL/L (ref 95–110)
CHOLEST SERPL-MCNC: 143 MG/DL (ref 120–199)
CHOLEST/HDLC SERPL: 3.7 {RATIO} (ref 2–5)
CK SERPL-CCNC: 41 U/L (ref 20–180)
CLARITY UR: CLEAR
CO2 SERPL-SCNC: 21 MMOL/L (ref 23–29)
COLOR UR: YELLOW
CREAT SERPL-MCNC: 1.3 MG/DL (ref 0.5–1.4)
DIFFERENTIAL METHOD: ABNORMAL
EOSINOPHIL # BLD AUTO: 0.1 K/UL (ref 0–0.5)
EOSINOPHIL NFR BLD: 1.5 % (ref 0–8)
ERYTHROCYTE [DISTWIDTH] IN BLOOD BY AUTOMATED COUNT: 15.2 % (ref 11.5–14.5)
EST. GFR  (AFRICAN AMERICAN): 48 ML/MIN/1.73 M^2
EST. GFR  (NON AFRICAN AMERICAN): 42 ML/MIN/1.73 M^2
GLUCOSE SERPL-MCNC: 36 MG/DL (ref 70–110)
GLUCOSE SERPL-MCNC: 46 MG/DL (ref 70–110)
GLUCOSE UR QL STRIP: NEGATIVE
HCT VFR BLD AUTO: 32.2 % (ref 37–48.5)
HDLC SERPL-MCNC: 39 MG/DL (ref 40–75)
HDLC SERPL: 27.3 % (ref 20–50)
HGB BLD-MCNC: 10.1 G/DL (ref 12–16)
HGB UR QL STRIP: NEGATIVE
HYALINE CASTS #/AREA URNS LPF: 0 /LPF
IMM GRANULOCYTES # BLD AUTO: 0.01 K/UL (ref 0–0.04)
IMM GRANULOCYTES NFR BLD AUTO: 0.3 % (ref 0–0.5)
INR PPP: 1 (ref 0.8–1.2)
KETONES UR QL STRIP: NEGATIVE
LACTATE SERPL-SCNC: 1 MMOL/L (ref 0.5–2.2)
LDLC SERPL CALC-MCNC: 86 MG/DL (ref 63–159)
LEUKOCYTE ESTERASE UR QL STRIP: NEGATIVE
LYMPHOCYTES # BLD AUTO: 1.4 K/UL (ref 1–4.8)
LYMPHOCYTES NFR BLD: 42.2 % (ref 18–48)
MAGNESIUM SERPL-MCNC: 1.5 MG/DL (ref 1.6–2.6)
MCH RBC QN AUTO: 27 PG (ref 27–31)
MCHC RBC AUTO-ENTMCNC: 31.4 G/DL (ref 32–36)
MCV RBC AUTO: 86 FL (ref 82–98)
MICROSCOPIC COMMENT: ABNORMAL
MONOCYTES # BLD AUTO: 0.3 K/UL (ref 0.3–1)
MONOCYTES NFR BLD: 10.2 % (ref 4–15)
NEUTROPHILS # BLD AUTO: 1.5 K/UL (ref 1.8–7.7)
NEUTROPHILS NFR BLD: 44.9 % (ref 38–73)
NITRITE UR QL STRIP: NEGATIVE
NONHDLC SERPL-MCNC: 104 MG/DL
NRBC BLD-RTO: 0 /100 WBC
PH UR STRIP: 5 [PH] (ref 5–8)
PHOSPHATE SERPL-MCNC: 2.7 MG/DL (ref 2.7–4.5)
PLATELET # BLD AUTO: 161 K/UL (ref 150–350)
PMV BLD AUTO: 12.3 FL (ref 9.2–12.9)
POCT GLUCOSE: 102 MG/DL (ref 70–110)
POCT GLUCOSE: 114 MG/DL (ref 70–110)
POCT GLUCOSE: 223 MG/DL (ref 70–110)
POCT GLUCOSE: 46 MG/DL (ref 70–110)
POTASSIUM SERPL-SCNC: 4.1 MMOL/L (ref 3.5–5.1)
PROT SERPL-MCNC: 8.6 G/DL (ref 6–8.4)
PROT UR QL STRIP: ABNORMAL
PROTHROMBIN TIME: 11.1 SEC (ref 9–12.5)
RBC # BLD AUTO: 3.74 M/UL (ref 4–5.4)
RBC #/AREA URNS HPF: 0 /HPF (ref 0–4)
SARS-COV-2 RDRP RESP QL NAA+PROBE: NEGATIVE
SODIUM SERPL-SCNC: 141 MMOL/L (ref 136–145)
SP GR UR STRIP: 1.02 (ref 1–1.03)
TRIGL SERPL-MCNC: 90 MG/DL (ref 30–150)
TSH SERPL DL<=0.005 MIU/L-ACNC: 0.95 UIU/ML (ref 0.4–4)
URN SPEC COLLECT METH UR: ABNORMAL
UROBILINOGEN UR STRIP-ACNC: NEGATIVE EU/DL
WBC # BLD AUTO: 3.32 K/UL (ref 3.9–12.7)
WBC #/AREA URNS HPF: 0 /HPF (ref 0–5)

## 2020-06-28 PROCEDURE — G0378 HOSPITAL OBSERVATION PER HR: HCPCS | Mod: CS

## 2020-06-28 PROCEDURE — 25000003 PHARM REV CODE 250: Performed by: EMERGENCY MEDICINE

## 2020-06-28 PROCEDURE — 93005 ELECTROCARDIOGRAM TRACING: CPT

## 2020-06-28 PROCEDURE — 80061 LIPID PANEL: CPT

## 2020-06-28 PROCEDURE — 81000 URINALYSIS NONAUTO W/SCOPE: CPT

## 2020-06-28 PROCEDURE — 82550 ASSAY OF CK (CPK): CPT

## 2020-06-28 PROCEDURE — 84100 ASSAY OF PHOSPHORUS: CPT

## 2020-06-28 PROCEDURE — 84443 ASSAY THYROID STIM HORMONE: CPT

## 2020-06-28 PROCEDURE — 82962 GLUCOSE BLOOD TEST: CPT

## 2020-06-28 PROCEDURE — G0508 CRIT CARE TELEHEA CONSULT 60: HCPCS | Mod: 95,,, | Performed by: PSYCHIATRY & NEUROLOGY

## 2020-06-28 PROCEDURE — 99291 CRITICAL CARE FIRST HOUR: CPT | Mod: 25

## 2020-06-28 PROCEDURE — 99292 CRITICAL CARE ADDL 30 MIN: CPT

## 2020-06-28 PROCEDURE — U0002 COVID-19 LAB TEST NON-CDC: HCPCS

## 2020-06-28 PROCEDURE — 96361 HYDRATE IV INFUSION ADD-ON: CPT

## 2020-06-28 PROCEDURE — 85610 PROTHROMBIN TIME: CPT

## 2020-06-28 PROCEDURE — G0508 PR CRITICAL CARE TELEHLTH INITIAL CONSULT 60MIN: ICD-10-PCS | Mod: 95,,, | Performed by: PSYCHIATRY & NEUROLOGY

## 2020-06-28 PROCEDURE — 93010 ELECTROCARDIOGRAM REPORT: CPT | Mod: ,,, | Performed by: INTERNAL MEDICINE

## 2020-06-28 PROCEDURE — G0378 HOSPITAL OBSERVATION PER HR: HCPCS

## 2020-06-28 PROCEDURE — 96374 THER/PROPH/DIAG INJ IV PUSH: CPT

## 2020-06-28 PROCEDURE — 85025 COMPLETE CBC W/AUTO DIFF WBC: CPT

## 2020-06-28 PROCEDURE — 83605 ASSAY OF LACTIC ACID: CPT

## 2020-06-28 PROCEDURE — 80053 COMPREHEN METABOLIC PANEL: CPT

## 2020-06-28 PROCEDURE — 83735 ASSAY OF MAGNESIUM: CPT

## 2020-06-28 PROCEDURE — P9612 CATHETERIZE FOR URINE SPEC: HCPCS

## 2020-06-28 PROCEDURE — 93010 EKG 12-LEAD: ICD-10-PCS | Mod: ,,, | Performed by: INTERNAL MEDICINE

## 2020-06-28 PROCEDURE — 87086 URINE CULTURE/COLONY COUNT: CPT

## 2020-06-28 RX ORDER — PANTOPRAZOLE SODIUM 40 MG/10ML
40 INJECTION, POWDER, LYOPHILIZED, FOR SOLUTION INTRAVENOUS DAILY
Status: DISCONTINUED | OUTPATIENT
Start: 2020-06-29 | End: 2020-06-29 | Stop reason: HOSPADM

## 2020-06-28 RX ORDER — ACETAMINOPHEN 325 MG/1
650 TABLET ORAL EVERY 8 HOURS PRN
Status: DISCONTINUED | OUTPATIENT
Start: 2020-06-28 | End: 2020-06-29 | Stop reason: HOSPADM

## 2020-06-28 RX ORDER — INSULIN ASPART 100 [IU]/ML
0-5 INJECTION, SOLUTION INTRAVENOUS; SUBCUTANEOUS
Status: DISCONTINUED | OUTPATIENT
Start: 2020-06-28 | End: 2020-06-29 | Stop reason: HOSPADM

## 2020-06-28 RX ORDER — QUETIAPINE FUMARATE 25 MG/1
25 TABLET, FILM COATED ORAL NIGHTLY PRN
Status: DISCONTINUED | OUTPATIENT
Start: 2020-06-28 | End: 2020-06-28

## 2020-06-28 RX ORDER — POLYETHYLENE GLYCOL 3350 17 G/17G
17 POWDER, FOR SOLUTION ORAL DAILY
Status: DISCONTINUED | OUTPATIENT
Start: 2020-06-29 | End: 2020-06-29

## 2020-06-28 RX ORDER — HYDROCHLOROTHIAZIDE 25 MG/1
25 TABLET ORAL DAILY
Status: DISCONTINUED | OUTPATIENT
Start: 2020-06-29 | End: 2020-06-29

## 2020-06-28 RX ORDER — CLOPIDOGREL BISULFATE 75 MG/1
75 TABLET ORAL DAILY
Status: DISCONTINUED | OUTPATIENT
Start: 2020-06-29 | End: 2020-06-29 | Stop reason: HOSPADM

## 2020-06-28 RX ORDER — HYDROXYCHLOROQUINE SULFATE 200 MG/1
200 TABLET, FILM COATED ORAL DAILY
Status: DISCONTINUED | OUTPATIENT
Start: 2020-06-29 | End: 2020-06-29 | Stop reason: HOSPADM

## 2020-06-28 RX ORDER — ONDANSETRON 2 MG/ML
4 INJECTION INTRAMUSCULAR; INTRAVENOUS EVERY 6 HOURS PRN
Status: DISCONTINUED | OUTPATIENT
Start: 2020-06-28 | End: 2020-06-29 | Stop reason: HOSPADM

## 2020-06-28 RX ORDER — IBUPROFEN 200 MG
16 TABLET ORAL
Status: DISCONTINUED | OUTPATIENT
Start: 2020-06-28 | End: 2020-06-29 | Stop reason: HOSPADM

## 2020-06-28 RX ORDER — HYDRALAZINE HYDROCHLORIDE 20 MG/ML
5 INJECTION INTRAMUSCULAR; INTRAVENOUS EVERY 6 HOURS PRN
Status: DISCONTINUED | OUTPATIENT
Start: 2020-06-28 | End: 2020-06-29 | Stop reason: HOSPADM

## 2020-06-28 RX ORDER — ASPIRIN 325 MG
325 TABLET, DELAYED RELEASE (ENTERIC COATED) ORAL DAILY
Status: DISCONTINUED | OUTPATIENT
Start: 2020-06-29 | End: 2020-06-29 | Stop reason: HOSPADM

## 2020-06-28 RX ORDER — PNV NO.95/FERROUS FUM/FOLIC AC 28MG-0.8MG
100 TABLET ORAL DAILY
Status: DISCONTINUED | OUTPATIENT
Start: 2020-06-29 | End: 2020-06-29 | Stop reason: HOSPADM

## 2020-06-28 RX ORDER — IBUPROFEN 200 MG
24 TABLET ORAL
Status: DISCONTINUED | OUTPATIENT
Start: 2020-06-28 | End: 2020-06-29 | Stop reason: HOSPADM

## 2020-06-28 RX ORDER — TALC
6 POWDER (GRAM) TOPICAL NIGHTLY PRN
Status: DISCONTINUED | OUTPATIENT
Start: 2020-06-28 | End: 2020-06-29 | Stop reason: HOSPADM

## 2020-06-28 RX ORDER — IPRATROPIUM BROMIDE AND ALBUTEROL SULFATE 2.5; .5 MG/3ML; MG/3ML
3 SOLUTION RESPIRATORY (INHALATION) EVERY 4 HOURS PRN
Status: DISCONTINUED | OUTPATIENT
Start: 2020-06-28 | End: 2020-06-29 | Stop reason: HOSPADM

## 2020-06-28 RX ORDER — SODIUM CHLORIDE 0.9 % (FLUSH) 0.9 %
10 SYRINGE (ML) INJECTION
Status: DISCONTINUED | OUTPATIENT
Start: 2020-06-28 | End: 2020-06-29 | Stop reason: HOSPADM

## 2020-06-28 RX ORDER — ATORVASTATIN CALCIUM 40 MG/1
40 TABLET, FILM COATED ORAL NIGHTLY
Status: DISCONTINUED | OUTPATIENT
Start: 2020-06-29 | End: 2020-06-29 | Stop reason: HOSPADM

## 2020-06-28 RX ORDER — GLUCAGON 1 MG
1 KIT INJECTION
Status: DISCONTINUED | OUTPATIENT
Start: 2020-06-28 | End: 2020-06-29 | Stop reason: HOSPADM

## 2020-06-28 RX ORDER — DEXTROSE 50 % IN WATER (D50W) INTRAVENOUS SYRINGE
25
Status: COMPLETED | OUTPATIENT
Start: 2020-06-28 | End: 2020-06-28

## 2020-06-28 RX ADMIN — SODIUM CHLORIDE 1000 ML: 0.9 INJECTION, SOLUTION INTRAVENOUS at 07:06

## 2020-06-28 RX ADMIN — DEXTROSE MONOHYDRATE 25 G: 25 INJECTION, SOLUTION INTRAVENOUS at 07:06

## 2020-06-28 NOTE — HPI
69 y/o AAF was with daughter when she developed generalized shaking and decreased responsiveness around 5p today.  She became combative with generalized weakness, worse on the left afterwards and incomprehensible speech.  Strength is improving and her speech is understandable but dysfluent and slow. She has a history of 2 strokes with L sided weakness. No history of seizures.

## 2020-06-28 NOTE — CONSULTS
[facility]  Vascular Neurology  Comprehensive Stroke Center  Tele-Consultation Note      Consults    Consulting Provider: SOL FERNANDEZ  Current Providers  No providers found    Patient Location:  [facility] EMERGENCY DEPARTMENT Emergency Department  Spoke hospital nurse at bedside with patient assisting consultant.     Patient information was obtained from relative(s).         Assessment/Plan:     STROKE DOCUMENTATION     Acute Stroke Times:   Acute Stroke Times   Symptom Onset Date: 06/28/20  Symptom Onset Time: 1700  Stroke Team Arrival Time: 1741  CT Interpretation Time: 1805    NIH Scale:  Interval: baseline  1a. Level of Consciousness: 1-->Not alert, but arousable by minor stimulation to obey, answer, or respond  1b. LOC Questions: 0-->Answers both questions correctly  1c. LOC Commands: 0-->Performs both tasks correctly  2. Best Gaze: 0-->Normal  3. Visual: 0-->No visual loss  4. Facial Palsy: 1-->Minor paralysis (flattened nasolabial fold, asymmetry on smiling)  5a. Motor Arm, Left: 1-->Drift, limb holds 90 (or 45) degrees, but drifts down before full 10 seconds, does not hit bed or other support(minimal )  5b. Motor Arm, Right: 0-->No drift, limb holds 90 (or 45) degrees for full 10 secs  6a. Motor Leg, Left: 1-->Drift, leg falls by the end of the 5-sec period but does not hit bed  6b. Motor Leg, Right: 1-->Drift, leg falls by the end of the 5-sec period but does not hit bed  7. Limb Ataxia: 0-->Absent  8. Sensory: 0-->Normal, no sensory loss  9. Best Language: 2-->Severe aphasia, all communication is through fragmentary expression, great need for inference, questioning, and guessing by the listener. Range of information that can be exchanged is limited, listener carries burden of. . . (see row details)  10. Dysarthria: 1-->Mild-to-moderate dysarthria, patient slurs at least some words and, at worst, can be understood with some difficulty  11. Extinction and Inattention  (formerly Neglect): 0-->No abnormality  Total (NIH Stroke Scale): 8     Modified [place]    Ruben Coma Scale:    ABCD2 Score:    [id number] Score:   HAS -BLED Score:   ICH Score:   Hunt & Sainz Classification:       Diagnoses:   Seizure  Presentation is most consistent with a secondarily generalized seizure and current post-ictal state but recurrent stroke(s) in DDx.  I am not comfortable with tPA given my higher index of suspicion of non-stroke diagnosis but will pursue CTA for evidence of LVO. Of note, L vert ends in PICA per old MRA.        Blood pressure (!) 126/59, pulse 84, temperature 98 °F (36.7 °C), temperature source Oral, resp. rate 20, height 6' (1.829 m), weight 63.5 kg (140 lb), SpO2 100 %, not currently breastfeeding.  Alteplase Eligible?: No  Alteplase Recommendation: Alteplase not recommended due to Suspected stroke mimic   Possible Interventional Revascularization Candidate? will check CTA to r/o LVO    Disposition Recommendation: pending further studies    Subjective:     History of Present Illness:  69 y/o AAF was with daughter when she developed generalized shaking and decreased responsiveness around 5p today.  She became combative with generalized weakness, worse on the left afterwards and incomprehensible speech.  Strength is improving and her speech is understandable but dysfluent and slow. She has a history of 2 strokes with L sided weakness. No history of seizures.      Woke up with symptoms?: no    Recent bleeding noted: no  Does the patient take any Blood Thinners? yes  Medications: Antiplatelets:  aspirin and clopidogrel    Past Medical History:   Diagnosis Date    Arthritis     Depression     Diabetes mellitus     Fibromyalgia     Hypertension     Stroke      Past Surgical History:   Procedure Laterality Date    BACK SURGERY      2 x for ruptured disc    KNEE SURGERY      right knee-meniscus     Social History     Tobacco Use    Smoking status: Current Every Day Smoker      Packs/day: 1.00     Years: 46.00     Pack years: 46.00     Types: Cigarettes    Smokeless tobacco: Never Used   Substance Use Topics    Alcohol use: Yes     Comment: socially    Drug use: Yes     Types: Marijuana     Comment: weekly     Family History   Problem Relation Age of Onset    Diabetes Mother     Diabetes Sister      No current facility-administered medications on file prior to encounter.      Current Outpatient Medications on File Prior to Encounter   Medication Sig Dispense Refill    aspirin (ECOTRIN) 325 MG EC tablet Take 1 tablet (325 mg total) by mouth once daily.  0    atorvastatin (LIPITOR) 40 MG tablet Take 1 tablet (40 mg total) by mouth every evening. 30 tablet 0    glipiZIDE (GLUCOTROL) 5 MG tablet Take 5 mg by mouth 2 (two) times daily before meals.      lisinopril-hydrochlorothiazide (PRINZIDE,ZESTORETIC) 20-25 mg Tab Take 1 tablet by mouth once daily.      oxycodone (ROXICODONE) 5 MG immediate release tablet Take 1 tablet (5 mg total) by mouth every 6 (six) hours as needed for Pain. 30 tablet 0    tramadol (ULTRAM) 50 mg tablet Take 50 mg by mouth every 6 (six) hours as needed for Pain.      acetaminophen (TYLENOL) 500 MG tablet Take 2 tablets (1,000 mg total) by mouth every 8 (eight) hours as needed for Pain or Temperature greater than (100.5). 30 tablet 0    clopidogrel (PLAVIX) 75 mg tablet Take 1 tablet (75 mg total) by mouth once daily. for 21 days 21 tablet 0    cyanocobalamin (VITAMIN B-12) 1000 MCG tablet Take 100 mcg by mouth once daily.      naproxen (NAPROSYN) 500 MG tablet Take 500 mg by mouth 2 (two) times daily.      ondansetron (ZOFRAN-ODT) 8 MG TbDL Take 1 tablet (8 mg total) by mouth every 8 (eight) hours as needed (Nausea). 20 tablet 0    QUEtiapine (SEROQUEL) 25 MG Tab Take 25 mg by mouth nightly as needed.      senna-docusate 8.6-50 mg (PERICOLACE) 8.6-50 mg per tablet Take 1 tablet by mouth 2 (two) times daily as needed for Constipation.          Allergies: No Known Allergies     Review of Systems   All other systems reviewed and are negative.    Objective:   Vitals: Blood pressure (!) 126/59, pulse 84, temperature 98 °F (36.7 °C), temperature source Oral, resp. rate 20, height 6' (1.829 m), weight 63.5 kg (140 lb), SpO2 100 %, not currently breastfeeding.     CT READ: Yes  Abnormal CT no change from prior-old L P/O and R pontine strokes, calcifications.     Physical Exam  Vitals signs reviewed.               Recommended the emergency room physician to have a brief discussion with the patient and/or family if available regarding the risks and benefits of treatment, and to briefly document the occurrence of that discussion in his clinical encounter note.     The attending portion of this evaluation, treatment, and documentation was performed per Dimitris Perdue MD via audiovisual.    Billing code:  (time dependent stroke, complex case, unstable patient, hemorrhages, any intervention, some mimics)    · This patient has a very critical neurological condition/illness, with very high morbidity and mortality.  · There is a very high probability for acute neurological change leading to clinical and possibly life-threatening deterioration requiring highest level of physician preparedness for urgent intervention.  · There is possibility that this condition will require treatment with high risk medications as quickly as possible.  · There is also a possibility that the patient may benefit from further, more advance complex therapies (e.g. endovascular therapy) that will require prompt diagnosis and care.  · Care was coordinated with other physicians involved in the patient's care.  · Radiologic studies and laboratory data were reviewed and interpreted, and plan of care was re-assessed based on the results.  · Diagnosis, treatment options and prognosis may have been discussed with the patient and/or family members or caregiver.  · Further advanced  medical management and further evaluation is warranted for her care.      In your opinion, this was a: Tier 1 Van Positive    Consult End Time: 6:25 PM     Dimitris Perdue MD  Comprehensive Stroke Center  Vascular Neurology   [facility]

## 2020-06-28 NOTE — ED TRIAGE NOTES
Pt presents with complaints of left sided weakness 20 mins PTA. Daughter states that pt was ambulating to bedside commode and was altered and became combative. Pt able to answer questions at this time but speech is impaired.

## 2020-06-28 NOTE — ED PROVIDER NOTES
Encounter Date: 6/28/2020    SCRIBE #1 NOTE: I, Fabienne Batista, am scribing for, and in the presence of,  Santiago Lopez MD. I have scribed the following portions of the note - Other sections scribed: HPI, ROS.       History     Chief Complaint   Patient presents with    Possible Stroke     pt. with daughter who reports 20-25 min prior to hosp. arrival pt.started to have left sided weakenss, unable to speak and facial dropping. pt. has a hx of 2 mills and already has left sided deficits     This is a 70 y.o. female with a PMHx of Strokes, DM, HTN, Arthritis, and Fibromyalgia who presents to the Emergency Department with a cc of a stroke x25 PTA. Her relative states that the pt was unable to walk and that she had to pick her up. Pt's relative reports associated speech difficulty and left-sided facial asymmetry. Pt's relative denies any HA, ear pain, eye pain, sore throat, abdominal pain, emesis, diarrhea, fever, or dysuria. The pt smokes cigarettes.      The history is provided by a relative. No  was used.     Review of patient's allergies indicates:  No Known Allergies  Past Medical History:   Diagnosis Date    Arthritis     Depression     Diabetes mellitus     Fibromyalgia     Hypertension     Stroke      Past Surgical History:   Procedure Laterality Date    BACK SURGERY      2 x for ruptured disc    KNEE SURGERY      right knee-meniscus     Family History   Problem Relation Age of Onset    Diabetes Mother     Diabetes Sister      Social History     Tobacco Use    Smoking status: Current Every Day Smoker     Packs/day: 1.00     Years: 46.00     Pack years: 46.00     Types: Cigarettes    Smokeless tobacco: Never Used   Substance Use Topics    Alcohol use: Yes     Comment: socially    Drug use: Yes     Types: Marijuana     Comment: weekly     Review of Systems   Constitutional: Negative for chills, diaphoresis and fever.   HENT: Negative for ear pain and sore throat.    Eyes:  Negative for photophobia, pain, discharge, redness and itching.   Respiratory: Negative for cough and shortness of breath.    Cardiovascular: Negative for chest pain.   Gastrointestinal: Negative for diarrhea, nausea and vomiting.   Genitourinary: Negative for dysuria.   Musculoskeletal: Positive for gait problem. Negative for arthralgias and myalgias.   Skin: Negative for rash.   Neurological: Positive for facial asymmetry and speech difficulty. Negative for headaches.       Physical Exam     Initial Vitals [06/28/20 1720]   BP Pulse Resp Temp SpO2   (!) 126/59 84 20 98 °F (36.7 °C) 100 %      MAP       --         Physical Exam  The patient was examined specifically for the following:   General:No significant distress, Good color, Warm and dry. Head and neck:Scalp atraumatic, Neck supple. Neurological:Appropriate conversation, Gross motor deficits. Eyes:Conjugate gaze, Clear corneas. ENT: No epistaxis. Cardiac: Regular rate and rhythm, Grossly normal heart tones. Pulmonary: Wheezing, Rales. Gastrointestinal: Abdominal tenderness, Abdominal distention. Musculoskeletal: Extremity deformity, Apparent pain with range of motion of the joints. Skin: Rash.   The findings on examination were normal except for the following:  The patient seems to be on able to speak.  There was a moan and one-word answer.  The face is symmetrical.  Lungs are clear.  The heart tones are normal.  The abdomen is soft.  Patient has profound weakness of the left lower extremity.  I do not see upper extremity drift.   ED Course   Critical Care    Date/Time: 6/28/2020 8:07 PM  Performed by: Santiago Lopez MD  Authorized by: Santiago Lopez MD   Direct patient critical care time: 22 minutes  Additional history critical care time: 11 minutes  Ordering / reviewing critical care time: 11 minutes  Documentation critical care time: 11 minutes  Consulting other physicians critical care time: 11 minutes  Consult with family critical care time: 11  minutes  Total critical care time (exclusive of procedural time) : 77 minutes  Critical care time was exclusive of separately billable procedures and treating other patients and teaching time.  Critical care was necessary to treat or prevent imminent or life-threatening deterioration of the following conditions: metabolic crisis and CNS failure or compromise.  Critical care was time spent personally by me on the following activities: development of treatment plan with patient or surrogate, discussions with consultants, discussions with primary provider, evaluation of patient's response to treatment, examination of patient, obtaining history from patient or surrogate, ordering and performing treatments and interventions, ordering and review of laboratory studies, ordering and review of radiographic studies, pulse oximetry, re-evaluation of patient's condition and review of old charts.        Labs Reviewed   CBC W/ AUTO DIFFERENTIAL - Abnormal; Notable for the following components:       Result Value    WBC 3.32 (*)     RBC 3.74 (*)     Hemoglobin 10.1 (*)     Hematocrit 32.2 (*)     Mean Corpuscular Hemoglobin Conc 31.4 (*)     RDW 15.2 (*)     Gran # (ANC) 1.5 (*)     All other components within normal limits   COMPREHENSIVE METABOLIC PANEL - Abnormal; Notable for the following components:    CO2 21 (*)     Glucose 36 (*)     BUN, Bld 32 (*)     Total Protein 8.6 (*)     AST 53 (*)     eGFR if  48 (*)     eGFR if non  42 (*)     All other components within normal limits    Narrative:     Glucose critical result(s) called and verbal readback obtained from   Dr. Ng by [facility] 06/28/2020 19:19   LIPID PANEL - Abnormal; Notable for the following components:    HDL 39 (*)     All other components within normal limits   URINALYSIS, REFLEX TO URINE CULTURE - Abnormal; Notable for the following components:    Protein, UA 1+ (*)     All other components within normal limits    Narrative:      Preferred Collection Type->Urine, Catheterized  Specimen Source->Urine   URINALYSIS MICROSCOPIC - Abnormal; Notable for the following components:    Bacteria Moderate (*)     All other components within normal limits    Narrative:     Preferred Collection Type->Urine, Catheterized  Specimen Source->Urine   POCT GLUCOSE, HAND-HELD DEVICE - Abnormal; Notable for the following components:    POC Glucose 46 (*)     All other components within normal limits   POCT GLUCOSE - Abnormal; Notable for the following components:    POCT Glucose 46 (*)     All other components within normal limits   POCT GLUCOSE - Abnormal; Notable for the following components:    POCT Glucose 223 (*)     All other components within normal limits   TSH   PROTIME-INR   POCT GLUCOSE     EKG Readings: (Independently Interpreted)   This patient is in a normal sinus rhythm with a heart rate of 72.  There are nonspecific ST segment and T-wave changes.  There is no definite evidence of acute myocardial infarction or malignant arrhythmia.       Imaging Results          CTA Head and Neck (xpd) (In process)                CT Head Without Contrast (Final result)  Result time 06/28/20 18:06:49    Final result by Cosme Perkins MD (06/28/20 18:06:49)                 Impression:      No acute intracranial process.  MRI may be attempted for further evaluation.    Old infarctions in the left cerebral hemisphere and right kyree as above.      Electronically signed by: Cosme Perkins MD  Date:    06/28/2020  Time:    18:06             Narrative:    EXAMINATION:  CT HEAD WITHOUT CONTRAST    CLINICAL HISTORY:  Stroke, follow up;    TECHNIQUE:  Low dose axial images were obtained through the head.  Coronal and sagittal reformations were also performed. Contrast was not administered.    COMPARISON:  CT head dated 10/28/2019 and MRI dated 10/28/2019.    FINDINGS:  The subcutaneous tissues are unremarkable.  The bony calvarium is intact.  There is trace mucosal thickening  within the right posterior ethmoid sinus.  The remainder of the paranasal sinuses are unremarkable.  The mastoid air cells are clear.  The orbits and intraorbital contents are within normal limits.    The craniocervical junction is unremarkable.  There are no extra-axial fluid collections.  There is no evidence of intracranial hemorrhage.  There is a stable calcification within the left posterolateral thalamus.  There are calcifications in the basal ganglia.  There is unchanged appearance of an old infarction in the left posterior frontal lobe and left parietal lobe.  There is also an old infarction in the left occipital lobe.  There is an old infarction in the right ruth ann kyree.  There are scattered hypodensities within the periventricular and subcortical white matter.  The gray-white differentiation is maintained.  There is no evidence of mass effect.                             Place  Medical decision making:  I evaluated this patient at 5:20 p.m..  I will place stroke orders now.  Dr. Perdue recommends a CTA of the head and neck, with a call back to him after the study is complete.  I wait the results of the patient's chemistries.  A chemistries were interesting for blood sugar 36.  It was recheck the bedside.  The blood sugar was 46.  The patient was poorly responsive at 7:28 p.m..  I ordered an amp of D50.  I will give the patient a L of normal saline because she will get a contrast injection.  The CTA of the head neck was ordered.  The patient's blood sugar in triage was 104.  The patient is on oral agents, glipizide,a sulfonylurea.  After an amp of IV D50.  The patient woke up and became neurologically intact.  I believe the cause the episode was hyperglycemia.  This patient will be admitted to observation because the sulfonylurea has a long half life.  Family does not have the equipment to monitor the blood sugar at home.  The patient's daughter reports that the patient is back to her neurologic baseline.   She has good motor strength in the left upper and left lower extremity.  She participates in conversation and negotiates her agenda.                Scribe Attestation:   Scribe #1: I performed the above scribed service and the documentation accurately describes the services I performed. I attest to the accuracy of the note.                          Clinical Impression:       ICD-10-CM ICD-9-CM   1. Hypoglycemia  E16.2 251.2   2. Altered mental status, unspecified altered mental status type  R41.82 780.97   3. Weakness of left lower extremity  R29.898 729.89   4. Poisoning by oral sulfonylurea derivative, accidental or unintentional, initial encounter  T38.3X1A 962.3     E858.0             ED Disposition Condition    Observation        I personally performed the services described in this documentation.  All medical record  entries made by the scribe are at my direction and in my presence.  Signed, Dr. Jessica Lopez MD  06/28/20 2008

## 2020-06-28 NOTE — SUBJECTIVE & OBJECTIVE
Woke up with symptoms?: no    Recent bleeding noted: no  Does the patient take any Blood Thinners? yes  Medications: Antiplatelets:  aspirin and clopidogrel    Past Medical History:   Diagnosis Date    Arthritis     Depression     Diabetes mellitus     Fibromyalgia     Hypertension     Stroke      Past Surgical History:   Procedure Laterality Date    BACK SURGERY      2 x for ruptured disc    KNEE SURGERY      right knee-meniscus     Social History     Tobacco Use    Smoking status: Current Every Day Smoker     Packs/day: 1.00     Years: 46.00     Pack years: 46.00     Types: Cigarettes    Smokeless tobacco: Never Used   Substance Use Topics    Alcohol use: Yes     Comment: socially    Drug use: Yes     Types: Marijuana     Comment: weekly     Family History   Problem Relation Age of Onset    Diabetes Mother     Diabetes Sister      No current facility-administered medications on file prior to encounter.      Current Outpatient Medications on File Prior to Encounter   Medication Sig Dispense Refill    aspirin (ECOTRIN) 325 MG EC tablet Take 1 tablet (325 mg total) by mouth once daily.  0    atorvastatin (LIPITOR) 40 MG tablet Take 1 tablet (40 mg total) by mouth every evening. 30 tablet 0    glipiZIDE (GLUCOTROL) 5 MG tablet Take 5 mg by mouth 2 (two) times daily before meals.      lisinopril-hydrochlorothiazide (PRINZIDE,ZESTORETIC) 20-25 mg Tab Take 1 tablet by mouth once daily.      oxycodone (ROXICODONE) 5 MG immediate release tablet Take 1 tablet (5 mg total) by mouth every 6 (six) hours as needed for Pain. 30 tablet 0    tramadol (ULTRAM) 50 mg tablet Take 50 mg by mouth every 6 (six) hours as needed for Pain.      acetaminophen (TYLENOL) 500 MG tablet Take 2 tablets (1,000 mg total) by mouth every 8 (eight) hours as needed for Pain or Temperature greater than (100.5). 30 tablet 0    clopidogrel (PLAVIX) 75 mg tablet Take 1 tablet (75 mg total) by mouth once daily. for 21 days 21 tablet  0    cyanocobalamin (VITAMIN B-12) 1000 MCG tablet Take 100 mcg by mouth once daily.      naproxen (NAPROSYN) 500 MG tablet Take 500 mg by mouth 2 (two) times daily.      ondansetron (ZOFRAN-ODT) 8 MG TbDL Take 1 tablet (8 mg total) by mouth every 8 (eight) hours as needed (Nausea). 20 tablet 0    QUEtiapine (SEROQUEL) 25 MG Tab Take 25 mg by mouth nightly as needed.      senna-docusate 8.6-50 mg (PERICOLACE) 8.6-50 mg per tablet Take 1 tablet by mouth 2 (two) times daily as needed for Constipation.         Allergies: No Known Allergies     Review of Systems   All other systems reviewed and are negative.    Objective:   Vitals: Blood pressure (!) 126/59, pulse 84, temperature 98 °F (36.7 °C), temperature source Oral, resp. rate 20, height 6' (1.829 m), weight 63.5 kg (140 lb), SpO2 100 %, not currently breastfeeding.     CT READ: Yes  Abnormal CT no change from prior-old L P/O and R pontine strokes, calcifications.     Physical Exam  Vitals signs reviewed.

## 2020-06-29 VITALS
SYSTOLIC BLOOD PRESSURE: 140 MMHG | TEMPERATURE: 98 F | HEART RATE: 65 BPM | DIASTOLIC BLOOD PRESSURE: 62 MMHG | RESPIRATION RATE: 18 BRPM | BODY MASS INDEX: 18.79 KG/M2 | OXYGEN SATURATION: 100 % | HEIGHT: 71 IN | WEIGHT: 134.25 LBS

## 2020-06-29 LAB
ALBUMIN SERPL BCP-MCNC: 3.3 G/DL (ref 3.5–5.2)
ALP SERPL-CCNC: 86 U/L (ref 55–135)
ALT SERPL W/O P-5'-P-CCNC: 38 U/L (ref 10–44)
ANION GAP SERPL CALC-SCNC: 6 MMOL/L (ref 8–16)
ASCENDING AORTA: 3.08 CM
AST SERPL-CCNC: 48 U/L (ref 10–40)
AV INDEX (PROSTH): 0.83
AV MEAN GRADIENT: 3 MMHG
AV PEAK GRADIENT: 4 MMHG
AV VALVE AREA: 3.79 CM2
AV VELOCITY RATIO: 0.86
BASOPHILS # BLD AUTO: 0.01 K/UL (ref 0–0.2)
BASOPHILS NFR BLD: 0.3 % (ref 0–1.9)
BILIRUB SERPL-MCNC: 0.2 MG/DL (ref 0.1–1)
BSA FOR ECHO PROCEDURE: 1.75 M2
BUN SERPL-MCNC: 26 MG/DL (ref 8–23)
CALCIUM SERPL-MCNC: 8.9 MG/DL (ref 8.7–10.5)
CHLORIDE SERPL-SCNC: 112 MMOL/L (ref 95–110)
CO2 SERPL-SCNC: 19 MMOL/L (ref 23–29)
CREAT SERPL-MCNC: 1.1 MG/DL (ref 0.5–1.4)
CV ECHO LV RWT: 0.57 CM
DIFFERENTIAL METHOD: ABNORMAL
DOP CALC AO PEAK VEL: 1.04 M/S
DOP CALC AO VTI: 24.35 CM
DOP CALC LVOT AREA: 4.6 CM2
DOP CALC LVOT DIAMETER: 2.41 CM
DOP CALC LVOT PEAK VEL: 0.89 M/S
DOP CALC LVOT STROKE VOLUME: 92.37 CM3
DOP CALCLVOT PEAK VEL VTI: 20.26 CM
E WAVE DECELERATION TIME: 294 MSEC
E/A RATIO: 1.33
E/E' RATIO: 13.13 M/S
ECHO LV POSTERIOR WALL: 1.19 CM (ref 0.6–1.1)
EOSINOPHIL # BLD AUTO: 0 K/UL (ref 0–0.5)
EOSINOPHIL NFR BLD: 1.4 % (ref 0–8)
ERYTHROCYTE [DISTWIDTH] IN BLOOD BY AUTOMATED COUNT: 15.2 % (ref 11.5–14.5)
EST. GFR  (AFRICAN AMERICAN): 59 ML/MIN/1.73 M^2
EST. GFR  (NON AFRICAN AMERICAN): 51 ML/MIN/1.73 M^2
ESTIMATED AVG GLUCOSE: 123 MG/DL (ref 68–131)
FRACTIONAL SHORTENING: 45 % (ref 28–44)
GLUCOSE SERPL-MCNC: 116 MG/DL (ref 70–110)
HBA1C MFR BLD HPLC: 5.9 % (ref 4–5.6)
HCT VFR BLD AUTO: 29.2 % (ref 37–48.5)
HGB BLD-MCNC: 9.2 G/DL (ref 12–16)
IMM GRANULOCYTES # BLD AUTO: 0.01 K/UL (ref 0–0.04)
IMM GRANULOCYTES NFR BLD AUTO: 0.3 % (ref 0–0.5)
INTERVENTRICULAR SEPTUM: 1.18 CM (ref 0.6–1.1)
IVRT: 62.28 MSEC
LA MAJOR: 5.42 CM
LA MINOR: 5.24 CM
LA WIDTH: 4.34 CM
LEFT ATRIUM SIZE: 3.19 CM
LEFT ATRIUM VOLUME INDEX: 35.2 ML/M2
LEFT ATRIUM VOLUME: 62.71 CM3
LEFT INTERNAL DIMENSION IN SYSTOLE: 2.29 CM (ref 2.1–4)
LEFT VENTRICLE DIASTOLIC VOLUME INDEX: 43.45 ML/M2
LEFT VENTRICLE DIASTOLIC VOLUME: 77.35 ML
LEFT VENTRICLE MASS INDEX: 97 G/M2
LEFT VENTRICLE SYSTOLIC VOLUME INDEX: 10 ML/M2
LEFT VENTRICLE SYSTOLIC VOLUME: 17.84 ML
LEFT VENTRICULAR INTERNAL DIMENSION IN DIASTOLE: 4.17 CM (ref 3.5–6)
LEFT VENTRICULAR MASS: 173 G
LV LATERAL E/E' RATIO: 11.67 M/S
LV SEPTAL E/E' RATIO: 15 M/S
LYMPHOCYTES # BLD AUTO: 0.9 K/UL (ref 1–4.8)
LYMPHOCYTES NFR BLD: 31.8 % (ref 18–48)
MCH RBC QN AUTO: 27.5 PG (ref 27–31)
MCHC RBC AUTO-ENTMCNC: 31.5 G/DL (ref 32–36)
MCV RBC AUTO: 87 FL (ref 82–98)
MONOCYTES # BLD AUTO: 0.3 K/UL (ref 0.3–1)
MONOCYTES NFR BLD: 10.8 % (ref 4–15)
MV PEAK A VEL: 0.79 M/S
MV PEAK E VEL: 1.05 M/S
MV STENOSIS PRESSURE HALF TIME: 77.97 MS
MV VALVE AREA P 1/2 METHOD: 2.82 CM2
NEUTROPHILS # BLD AUTO: 1.6 K/UL (ref 1.8–7.7)
NEUTROPHILS NFR BLD: 55.4 % (ref 38–73)
NRBC BLD-RTO: 0 /100 WBC
PISA TR MAX VEL: 2.98 M/S
PLATELET # BLD AUTO: 172 K/UL (ref 150–350)
PMV BLD AUTO: 11.4 FL (ref 9.2–12.9)
POCT GLUCOSE: 105 MG/DL (ref 70–110)
POCT GLUCOSE: 108 MG/DL (ref 70–110)
POCT GLUCOSE: 125 MG/DL (ref 70–110)
POCT GLUCOSE: 137 MG/DL (ref 70–110)
POCT GLUCOSE: 24 MG/DL (ref 70–110)
POCT GLUCOSE: 254 MG/DL (ref 70–110)
POCT GLUCOSE: 51 MG/DL (ref 70–110)
POCT GLUCOSE: 54 MG/DL (ref 70–110)
POCT GLUCOSE: 65 MG/DL (ref 70–110)
POCT GLUCOSE: 66 MG/DL (ref 70–110)
POTASSIUM SERPL-SCNC: 3.9 MMOL/L (ref 3.5–5.1)
PROT SERPL-MCNC: 7.6 G/DL (ref 6–8.4)
PV PEAK VELOCITY: 1.32 CM/S
RA MAJOR: 5.15 CM
RA PRESSURE: 3 MMHG
RA WIDTH: 3.17 CM
RBC # BLD AUTO: 3.34 M/UL (ref 4–5.4)
RIGHT VENTRICULAR END-DIASTOLIC DIMENSION: 2.71 CM
RV TISSUE DOPPLER FREE WALL SYSTOLIC VELOCITY 1 (APICAL 4 CHAMBER VIEW): 12.97 CM/S
SINUS: 3.25 CM
SODIUM SERPL-SCNC: 137 MMOL/L (ref 136–145)
STJ: 2.78 CM
TDI LATERAL: 0.09 M/S
TDI SEPTAL: 0.07 M/S
TDI: 0.08 M/S
TR MAX PG: 36 MMHG
TRICUSPID ANNULAR PLANE SYSTOLIC EXCURSION: 2.4 CM
TV REST PULMONARY ARTERY PRESSURE: 39 MMHG
WBC # BLD AUTO: 2.86 K/UL (ref 3.9–12.7)

## 2020-06-29 PROCEDURE — 99213 PR OFFICE/OUTPT VISIT, EST, LEVL III, 20-29 MIN: ICD-10-PCS | Mod: ,,, | Performed by: PSYCHIATRY & NEUROLOGY

## 2020-06-29 PROCEDURE — 94761 N-INVAS EAR/PLS OXIMETRY MLT: CPT

## 2020-06-29 PROCEDURE — C9113 INJ PANTOPRAZOLE SODIUM, VIA: HCPCS | Performed by: PHYSICIAN ASSISTANT

## 2020-06-29 PROCEDURE — 80053 COMPREHEN METABOLIC PANEL: CPT

## 2020-06-29 PROCEDURE — 83036 HEMOGLOBIN GLYCOSYLATED A1C: CPT

## 2020-06-29 PROCEDURE — 63600175 PHARM REV CODE 636 W HCPCS: Performed by: PHYSICIAN ASSISTANT

## 2020-06-29 PROCEDURE — G0378 HOSPITAL OBSERVATION PER HR: HCPCS

## 2020-06-29 PROCEDURE — 36415 COLL VENOUS BLD VENIPUNCTURE: CPT

## 2020-06-29 PROCEDURE — 99213 OFFICE O/P EST LOW 20 MIN: CPT | Mod: ,,, | Performed by: PSYCHIATRY & NEUROLOGY

## 2020-06-29 PROCEDURE — 25000003 PHARM REV CODE 250: Performed by: PHYSICIAN ASSISTANT

## 2020-06-29 PROCEDURE — 85025 COMPLETE CBC W/AUTO DIFF WBC: CPT

## 2020-06-29 PROCEDURE — 96376 TX/PRO/DX INJ SAME DRUG ADON: CPT

## 2020-06-29 PROCEDURE — 96375 TX/PRO/DX INJ NEW DRUG ADDON: CPT | Mod: 59

## 2020-06-29 RX ORDER — HYDROXYCHLOROQUINE SULFATE 200 MG/1
200 TABLET, FILM COATED ORAL DAILY
Start: 2020-06-30

## 2020-06-29 RX ADMIN — Medication 16 G: at 05:06

## 2020-06-29 RX ADMIN — HYDROXYCHLOROQUINE SULFATE 200 MG: 200 TABLET ORAL at 08:06

## 2020-06-29 RX ADMIN — ATORVASTATIN CALCIUM 40 MG: 40 TABLET, FILM COATED ORAL at 01:06

## 2020-06-29 RX ADMIN — VITAM B12 100 MCG: 100 TAB at 08:06

## 2020-06-29 RX ADMIN — PANTOPRAZOLE SODIUM 40 MG: 40 INJECTION, POWDER, FOR SOLUTION INTRAVENOUS at 08:06

## 2020-06-29 RX ADMIN — ASPIRIN 325 MG: 325 TABLET, DELAYED RELEASE ORAL at 08:06

## 2020-06-29 RX ADMIN — CLOPIDOGREL BISULFATE 75 MG: 75 TABLET ORAL at 08:06

## 2020-06-29 RX ADMIN — DEXTROSE MONOHYDRATE 25 G: 25 INJECTION, SOLUTION INTRAVENOUS at 02:06

## 2020-06-29 NOTE — ASSESSMENT & PLAN NOTE
Appears controlled  Continue HCTZ, hold lisinopril given elevated BUN  Hydralazine prn  Continue to monitor

## 2020-06-29 NOTE — NURSING
"While rounding on patient she was confused and slurred speech, checked sugar and it was 24, gave prn dextrose 50% inj 25g, rechecked glucose 254, patient stated " she feels fine, I thought you said I could go to sleep", will continue to monitor.  "

## 2020-06-29 NOTE — HPI
71yo F former smoker with pmh RA, SLE, multiple CVAs, NIDDM, HTN, depression, fibromyalgia, who presents to the ED with concern for stroke.     Narrative per daughter: Pt was lying in bed, was attempting to get up to use the bedside commode, she and daughter were engaged in conversation, but pt was unable to rise and get up to the edge of the bed. She was helped over to the commode, didn't use the restroom, and then again had difficulty rising and moving. She became agitated and told daughter that she didn't need any help with moving, but was clearly having some difficulty walking, standing up. There may have been L sided facial asymmetry. Pt displayed some dysarthria as well. She was brought to the ED.  Pt denies any CP, SOB. Chronic BUNCH, related to COPD. No recent illness or sick contacts. No recent hospitalization. Compliant with all medications.    Daughter does state that she is hypoglycemic sometimes. She eats in the morning, and in the evening, and sometimes goes prolonged periods of time without eating.    On my evaluation, patient is back at baseline per daughter. No focal deficit appreciated.

## 2020-06-29 NOTE — NURSING
Patient arrived to floor via stretcher via transporter from ED. Patient transferred to bed via x1 person assist. AAOx4. Patient was oriented to room, information on whiteboard, and medication regimen. Bed low, adequate lighting provided, side rails x2 up, call bell within reach. Admission assessment completed. VSS. Patient denied having any acute distress at this time. None observed. Will continue to monitor and follow treatment plan.

## 2020-06-29 NOTE — PLAN OF CARE
06/29/20 1510   Final Note   Assessment Type Final Discharge Note   Anticipated Discharge Disposition Home   Hospital Follow Up  Appt(s) scheduled? Yes   Discharge plans and expectations educations in teach back method with documentation complete? Yes   Right Care Referral Info   Post Acute Recommendation No Care   Post-Acute Status   Post-Acute Authorization Other   Other Status No Post-Acute Service Needs   Pts nurse Payal notified that the pt can d/c from CM standpoint.     call placed to pts daughter Yuliya to advise that [facility] did not accept pt and can not on today if at all and pt is ready for d/c from CM standpoint. Yuliya verbalized understanding TN also advised that a referral can be sent to other facilities other than [facility].  Yuliya in agreement with this.  Referral sent to 7 additional facilities and indicated that the pt has been discharged to home and daughter would be the person to contact for admission questions and completion of paperwork.

## 2020-06-29 NOTE — ASSESSMENT & PLAN NOTE
Possibly with CVA/TIA today  Greater suspicion for post ictal state per telemedicine  Significantly hypoglycemic, mentation improved with D5  No hx seizure disorder  Lactic, CK pending  AAOx3, no focal deficits on my eval  Will consult neurology for evaluation  Continue glucose checks, neuro checks

## 2020-06-29 NOTE — SUBJECTIVE & OBJECTIVE
Past Medical History:   Diagnosis Date    Arthritis     Depression     Diabetes mellitus     Fibromyalgia     Hypertension     Stroke        Past Surgical History:   Procedure Laterality Date    BACK SURGERY      2 x for ruptured disc    KNEE SURGERY      right knee-meniscus       Review of patient's allergies indicates:  No Known Allergies    No current facility-administered medications on file prior to encounter.      Current Outpatient Medications on File Prior to Encounter   Medication Sig    aspirin (ECOTRIN) 325 MG EC tablet Take 1 tablet (325 mg total) by mouth once daily.    atorvastatin (LIPITOR) 40 MG tablet Take 1 tablet (40 mg total) by mouth every evening.    glipiZIDE (GLUCOTROL) 5 MG tablet Take 5 mg by mouth 2 (two) times daily before meals.    lisinopril-hydrochlorothiazide (PRINZIDE,ZESTORETIC) 20-25 mg Tab Take 1 tablet by mouth once daily.    oxycodone (ROXICODONE) 5 MG immediate release tablet Take 1 tablet (5 mg total) by mouth every 6 (six) hours as needed for Pain.    tramadol (ULTRAM) 50 mg tablet Take 50 mg by mouth every 6 (six) hours as needed for Pain.    acetaminophen (TYLENOL) 500 MG tablet Take 2 tablets (1,000 mg total) by mouth every 8 (eight) hours as needed for Pain or Temperature greater than (100.5).    clopidogrel (PLAVIX) 75 mg tablet Take 1 tablet (75 mg total) by mouth once daily. for 21 days    cyanocobalamin (VITAMIN B-12) 1000 MCG tablet Take 100 mcg by mouth once daily.    naproxen (NAPROSYN) 500 MG tablet Take 500 mg by mouth 2 (two) times daily.    ondansetron (ZOFRAN-ODT) 8 MG TbDL Take 1 tablet (8 mg total) by mouth every 8 (eight) hours as needed (Nausea).    QUEtiapine (SEROQUEL) 25 MG Tab Take 25 mg by mouth nightly as needed.    senna-docusate 8.6-50 mg (PERICOLACE) 8.6-50 mg per tablet Take 1 tablet by mouth 2 (two) times daily as needed for Constipation.     Family History     Problem Relation (Age of Onset)    Diabetes Mother, Sister         Tobacco Use    Smoking status: Current Every Day Smoker     Packs/day: 1.00     Years: 46.00     Pack years: 46.00     Types: Cigarettes    Smokeless tobacco: Never Used   Substance and Sexual Activity    Alcohol use: Yes     Comment: socially    Drug use: Yes     Types: Marijuana     Comment: weekly    Sexual activity: Not on file     Review of Systems   Constitutional: Negative for activity change, appetite change, chills and fever.   Eyes: Negative for visual disturbance.   Respiratory: Negative for cough and shortness of breath.    Cardiovascular: Negative for chest pain, palpitations and leg swelling.   Gastrointestinal: Negative for abdominal pain, blood in stool, constipation, diarrhea, nausea and vomiting.   Musculoskeletal: Negative for myalgias, neck pain and neck stiffness.   Skin: Negative for rash.   Neurological: Positive for facial asymmetry, speech difficulty and weakness. Negative for dizziness and light-headedness.   Psychiatric/Behavioral: Positive for confusion.     Objective:     Vital Signs (Most Recent):  Temp: 98 °F (36.7 °C) (06/28/20 1720)  Pulse: 77 (06/28/20 1842)  Resp: 17 (06/28/20 1845)  BP: (!) 147/65 (06/28/20 1846)  SpO2: 99 % (06/28/20 1842) Vital Signs (24h Range):  Temp:  [98 °F (36.7 °C)] 98 °F (36.7 °C)  Pulse:  [77-84] 77  Resp:  [17-21] 17  SpO2:  [96 %-100 %] 99 %  BP: (126-147)/(59-73) 147/65     Weight: 63.5 kg (140 lb)  Body mass index is 18.99 kg/m².    Physical Exam  Vitals signs and nursing note reviewed.   Constitutional:       General: She is not in acute distress.     Appearance: Normal appearance. She is not ill-appearing, toxic-appearing or diaphoretic.      Comments: Overall well-appearing, nontoxic. Thin, pleasant, elderly female.   HENT:      Head: Normocephalic and atraumatic.      Mouth/Throat:      Mouth: Mucous membranes are moist.      Comments: Tongue midline  Eyes:      Extraocular Movements: Extraocular movements intact.      Comments: OS with  scar to cornea. PERRLA. No nystagmus.   Neck:      Musculoskeletal: Normal range of motion and neck supple.   Cardiovascular:      Rate and Rhythm: Normal rate and regular rhythm.      Pulses: Normal pulses.   Pulmonary:      Effort: Pulmonary effort is normal. No respiratory distress.      Breath sounds: Normal breath sounds.      Comments: Faint expir rhonchi to L base  Abdominal:      Tenderness: There is no abdominal tenderness.   Musculoskeletal: Normal range of motion.   Skin:     General: Skin is warm.   Neurological:      Mental Status: She is alert and oriented to person, place, and time. Mental status is at baseline.      Comments: Grossly equal , shrug, biceps, triceps, hip flexion, ankle dorsiflexion/plantarflexion strength bilaterally. No UE or LE motor drift. Symmetric finger to nose. No gross sensory deficit.   Psychiatric:         Mood and Affect: Mood normal.         Behavior: Behavior normal.         Thought Content: Thought content normal.         Judgment: Judgment normal.             Significant Labs: All pertinent labs within the past 24 hours have been reviewed.    Significant Imaging: I have reviewed and interpreted all pertinent imaging results/findings within the past 24 hours.     CT head:  FINDINGS:  The subcutaneous tissues are unremarkable.  The bony calvarium is intact.  There is trace mucosal thickening within the right posterior ethmoid sinus.  The remainder of the paranasal sinuses are unremarkable.  The mastoid air cells are clear.  The orbits and intraorbital contents are within normal limits.     The craniocervical junction is unremarkable.  There are no extra-axial fluid collections.  There is no evidence of intracranial hemorrhage.  There is a stable calcification within the left posterolateral thalamus.  There are calcifications in the basal ganglia.  There is unchanged appearance of an old infarction in the left posterior frontal lobe and left parietal lobe.  There is  also an old infarction in the left occipital lobe.  There is an old infarction in the right ruth ann kyree.  There are scattered hypodensities within the periventricular and subcortical white matter.  The gray-white differentiation is maintained.  There is no evidence of mass effect.     Impression:     No acute intracranial process.  MRI may be attempted for further evaluation.     Old infarctions in the left cerebral hemisphere and right kyree as above.

## 2020-06-29 NOTE — CONSULTS
"[facility]  Neurology  Consult Note    Patient Name: Lucina Villalpando  MRN: [id number]  Admission Date: 6/28/2020  Hospital Length of Stay: 0 days  Code Status: Full Code   Attending Provider: Milvia Swann MD   Consulting Provider: Cheng Jose MD  Primary Care Physician: Alexia Cervantes MD  Principal Problem:<principal problem not specified>    Consults  Subjective:     Chief Complaint: "I think I passed out"     HPI: 69 y/o female with medical Hx as listed listed in PMHx section was brought to ED after her daughter noticed that pt was not able to get up from a chair and became agitated. Ms. Villalpando had difficulty walking and speaking,  and seemed to have left facial weakness. Upon arrival to ED she was found to have BG of 36. After receiving D50 pt returned to her baseline. She tells me that she feels at her normal state. No convulsions. Hx of two prior strokes (right kyree and left parieto-occipital).    Past Medical History:   Diagnosis Date    Arthritis     Depression     Diabetes mellitus     Fibromyalgia     Hypertension     Stroke        Past Surgical History:   Procedure Laterality Date    BACK SURGERY      2 x for ruptured disc    KNEE SURGERY      right knee-meniscus       Review of patient's allergies indicates:  No Known Allergies    Current Neurological Medications:     No current facility-administered medications on file prior to encounter.      Current Outpatient Medications on File Prior to Encounter   Medication Sig    aspirin (ECOTRIN) 325 MG EC tablet Take 1 tablet (325 mg total) by mouth once daily.    atorvastatin (LIPITOR) 40 MG tablet Take 1 tablet (40 mg total) by mouth every evening.    glipiZIDE (GLUCOTROL) 5 MG tablet Take 5 mg by mouth 2 (two) times daily before meals.    lisinopril-hydrochlorothiazide (PRINZIDE,ZESTORETIC) 20-25 mg Tab Take 1 tablet by mouth once daily.    oxycodone (ROXICODONE) 5 MG immediate release tablet Take 1 tablet (5 mg total) " by mouth every 6 (six) hours as needed for Pain.    tramadol (ULTRAM) 50 mg tablet Take 50 mg by mouth every 6 (six) hours as needed for Pain.    acetaminophen (TYLENOL) 500 MG tablet Take 2 tablets (1,000 mg total) by mouth every 8 (eight) hours as needed for Pain or Temperature greater than (100.5).    clopidogrel (PLAVIX) 75 mg tablet Take 1 tablet (75 mg total) by mouth once daily. for 21 days    cyanocobalamin (VITAMIN B-12) 1000 MCG tablet Take 100 mcg by mouth once daily.    naproxen (NAPROSYN) 500 MG tablet Take 500 mg by mouth 2 (two) times daily.    ondansetron (ZOFRAN-ODT) 8 MG TbDL Take 1 tablet (8 mg total) by mouth every 8 (eight) hours as needed (Nausea).    QUEtiapine (SEROQUEL) 25 MG Tab Take 25 mg by mouth nightly as needed.    senna-docusate 8.6-50 mg (PERICOLACE) 8.6-50 mg per tablet Take 1 tablet by mouth 2 (two) times daily as needed for Constipation.      Family History     Problem Relation (Age of Onset)    Diabetes Mother, Sister        Tobacco Use    Smoking status: Current Every Day Smoker     Packs/day: 1.00     Years: 46.00     Pack years: 46.00     Types: Cigarettes    Smokeless tobacco: Never Used   Substance and Sexual Activity    Alcohol use: Yes     Comment: socially    Drug use: Yes     Types: Marijuana     Comment: weekly    Sexual activity: Not on file     Review of Systems   Constitutional: Negative for fever.   HENT: Negative for trouble swallowing.    Eyes: Negative for photophobia.   Respiratory: Negative for shortness of breath.    Cardiovascular: Negative for chest pain.   Gastrointestinal: Negative for abdominal pain.   Genitourinary: Negative for dysuria.   Musculoskeletal: Negative for back pain.   Neurological: Negative for headaches.          Objective:     Vital Signs (Most Recent):  Temp: 98.6 °F (37 °C) (06/29/20 0747)  Pulse: 69 (06/29/20 0747)  Resp: 18 (06/29/20 0747)  BP: 137/63 (06/29/20 0747)  SpO2: 98 % (06/29/20 0747) Vital Signs (24h  Range):  Temp:  [98 °F (36.7 °C)-98.6 °F (37 °C)] 98.6 °F (37 °C)  Pulse:  [63-84] 69  Resp:  [16-21] 18  SpO2:  [93 %-100 %] 98 %  BP: (126-167)/(59-84) 137/63     Weight: 60.9 kg (134 lb 4.2 oz)  Body mass index is 18.73 kg/m².    Physical Exam  Constitutional:       General: She is not in acute distress.     Appearance: She is not ill-appearing.   HENT:      Head: Normocephalic and atraumatic.      Right Ear: External ear normal.      Left Ear: External ear normal.   Eyes:      General:         Right eye: No discharge.         Left eye: No discharge.   Cardiovascular:      Rate and Rhythm: Normal rate and regular rhythm.   Abdominal:      Palpations: Abdomen is soft.   Musculoskeletal:         General: No tenderness.   Neurological:      Mental Status: She is oriented to person, place, and time.      Coordination: Finger-Nose-Finger Test normal.   Psychiatric:         Speech: Speech normal.         NEUROLOGICAL EXAMINATION:     MENTAL STATUS   Oriented to person, place, and time.   Speech: speech is normal   Level of consciousness: alert    CRANIAL NERVES     CN III, IV, VI   Right pupil: Size: 3 mm. Shape: regular.   Left pupil: Size: 3 mm. Shape: regular.   Nystagmus: none   Ophthalmoparesis: none  Conjugate gaze: present    CN VII   Right facial weakness: none  Left facial weakness: none    CN XII   Tongue deviation: none    MOTOR EXAM        Left 4/5  Right: 4/5     SENSORY EXAM   Right arm light touch: normal  Left arm light touch: normal  Right leg light touch: normal  Left leg light touch: normal    GAIT AND COORDINATION      Coordination   Finger to nose coordination: normal    Tremor   Resting tremor: absent      Significant Labs:   CBC:   Recent Labs   Lab 06/28/20 1747 06/29/20  0343   WBC 3.32* 2.86*   HGB 10.1* 9.2*   HCT 32.2* 29.2*    172     CMP:   Recent Labs   Lab 06/28/20 1747 06/28/20 2110 06/29/20  0343   GLU 36*  --  116*     --  137   K 4.1  --  3.9     --  112*   CO2  21*  --  19*   BUN 32*  --  26*   CREATININE 1.3  --  1.1   CALCIUM 9.4  --  8.9   MG  --  1.5*  --    PROT 8.6*  --  7.6   ALBUMIN 3.9  --  3.3*   BILITOT 0.2  --  0.2   ALKPHOS 107  --  86   AST 53*  --  48*   ALT 44  --  38   ANIONGAP 10  --  6*   EGFRNONAA 42*  --  51*       Significant Imaging: I have reviewed and interpreted all pertinent imaging results/findings within the past 24 hours.   Head CT  FINDINGS:  The subcutaneous tissues are unremarkable.  The bony calvarium is intact.  There is trace mucosal thickening within the right posterior ethmoid sinus.  The remainder of the paranasal sinuses are unremarkable.  The mastoid air cells are clear.  The orbits and intraorbital contents are within normal limits.     The craniocervical junction is unremarkable.  There are no extra-axial fluid collections.  There is no evidence of intracranial hemorrhage.  There is a stable calcification within the left posterolateral thalamus.  There are calcifications in the basal ganglia.  There is unchanged appearance of an old infarction in the left posterior frontal lobe and left parietal lobe.  There is also an old infarction in the left occipital lobe.  There is an old infarction in the right ruth ann kyree.  There are scattered hypodensities within the periventricular and subcortical white matter.  The gray-white differentiation is maintained.  There is no evidence of mass effect.     Impression:     No acute intracranial process.  MRI may be attempted for further evaluation.     Old infarctions in the left cerebral hemisphere and right kyree as above.        Electronically signed by: Cosme Perkins MD  Date:                                            06/28/2020  Time:                                           18:06    Assessment and Plan:     69 y/o female consulted for possible stroke    1. Stroke: although a new stroke can't be R/O seems that her symptoms are the result of hypoglycemia. Seizure may have a similar presentation  ans she is at risk due to cortical stroke and again, hypoglycemia.   Pt is back to her baseline.   -OK to obtain MRI of brain.   -Will continue dual antiplatelets and statin.   -No AED's for now unless a seizure is again suspected with no triggering factors.    Active Diagnoses:    Diagnosis Date Noted POA    Seizure [R56.9] 06/28/2020 Yes    Hypoglycemia [E16.2] 06/28/2020 Yes    Altered mental status [R41.82] 06/28/2020 Unknown    DM (diabetes mellitus) [E11.9] 06/28/2020 Unknown    CKD (chronic kidney disease) [N18.9] 06/28/2020 Unknown    Tobacco abuse [Z72.0] 10/29/2019 Yes    H/O: CVA (cerebrovascular accident) [Z86.73] 09/16/2015 Not Applicable    Essential hypertension [I10] 09/16/2015 Yes      Problems Resolved During this Admission:       VTE Risk Mitigation (From admission, onward)         Ordered     IP VTE LOW RISK PATIENT  Once      06/28/20 2356     Place YULI hose  Until discontinued      06/28/20 2356     Place sequential compression device  Until discontinued      06/28/20 2356                Thank you for your consult. I will follow-up with patient. Please contact us if you have any additional questions.    Cheng Jose MD  Neurology  [facility]

## 2020-06-29 NOTE — H&P
[facility] Medicine  History & Physical    Patient Name: uLcina Villalpando  MRN: [id number]  Admission Date: 6/28/2020  Attending Physician: Milvia Alonso MD  Primary Care Provider: Alexia Cervantes MD         Patient information was obtained from patient, relative(s), past medical records and ER records.     Subjective:     Principal Problem:<principal problem not specified>    Chief Complaint:   Chief Complaint   Patient presents with    Possible Stroke     pt. with daughter who reports 20-25 min prior to hosp. arrival pt.started to have left sided weakenss, unable to speak and facial dropping. pt. has a hx of 2 mills and already has left sided deficits        HPI: 71yo F former smoker with pmh RA, SLE, multiple CVAs, NIDDM, HTN, depression, fibromyalgia, who presents to the ED with concern for stroke.     Narrative per daughter: Pt was lying in bed, was attempting to get up to use the bedside commode, she and daughter were engaged in conversation, but pt was unable to rise and get up to the edge of the bed. She was helped over to the commode, didn't use the restroom, and then again had difficulty rising and moving. She became agitated and told daughter that she didn't need any help with moving, but was clearly having some difficulty walking, standing up. There may have been L sided facial asymmetry. Pt displayed some dysarthria as well. She was brought to the ED.  Pt denies any CP, SOB. Chronic BUNCH, related to COPD. No recent illness or sick contacts. No recent hospitalization. Compliant with all medications.    Daughter does state that she is hypoglycemic sometimes. She eats in the morning, and in the evening, and sometimes goes prolonged periods of time without eating.    On my evaluation, patient is back at baseline per daughter. No focal deficit appreciated.    Past Medical History:   Diagnosis Date    Arthritis     Depression     Diabetes mellitus     Fibromyalgia      Hypertension     Stroke        Past Surgical History:   Procedure Laterality Date    BACK SURGERY      2 x for ruptured disc    KNEE SURGERY      right knee-meniscus       Review of patient's allergies indicates:  No Known Allergies    No current facility-administered medications on file prior to encounter.      Current Outpatient Medications on File Prior to Encounter   Medication Sig    aspirin (ECOTRIN) 325 MG EC tablet Take 1 tablet (325 mg total) by mouth once daily.    atorvastatin (LIPITOR) 40 MG tablet Take 1 tablet (40 mg total) by mouth every evening.    glipiZIDE (GLUCOTROL) 5 MG tablet Take 5 mg by mouth 2 (two) times daily before meals.    lisinopril-hydrochlorothiazide (PRINZIDE,ZESTORETIC) 20-25 mg Tab Take 1 tablet by mouth once daily.    oxycodone (ROXICODONE) 5 MG immediate release tablet Take 1 tablet (5 mg total) by mouth every 6 (six) hours as needed for Pain.    tramadol (ULTRAM) 50 mg tablet Take 50 mg by mouth every 6 (six) hours as needed for Pain.    acetaminophen (TYLENOL) 500 MG tablet Take 2 tablets (1,000 mg total) by mouth every 8 (eight) hours as needed for Pain or Temperature greater than (100.5).    clopidogrel (PLAVIX) 75 mg tablet Take 1 tablet (75 mg total) by mouth once daily. for 21 days    cyanocobalamin (VITAMIN B-12) 1000 MCG tablet Take 100 mcg by mouth once daily.    naproxen (NAPROSYN) 500 MG tablet Take 500 mg by mouth 2 (two) times daily.    ondansetron (ZOFRAN-ODT) 8 MG TbDL Take 1 tablet (8 mg total) by mouth every 8 (eight) hours as needed (Nausea).    QUEtiapine (SEROQUEL) 25 MG Tab Take 25 mg by mouth nightly as needed.    senna-docusate 8.6-50 mg (PERICOLACE) 8.6-50 mg per tablet Take 1 tablet by mouth 2 (two) times daily as needed for Constipation.     Family History     Problem Relation (Age of Onset)    Diabetes Mother, Sister        Tobacco Use    Smoking status: Current Every Day Smoker     Packs/day: 1.00     Years: 46.00     Pack years:  46.00     Types: Cigarettes    Smokeless tobacco: Never Used   Substance and Sexual Activity    Alcohol use: Yes     Comment: socially    Drug use: Yes     Types: Marijuana     Comment: weekly    Sexual activity: Not on file     Review of Systems   Constitutional: Negative for activity change, appetite change, chills and fever.   Eyes: Negative for visual disturbance.   Respiratory: Negative for cough and shortness of breath.    Cardiovascular: Negative for chest pain, palpitations and leg swelling.   Gastrointestinal: Negative for abdominal pain, blood in stool, constipation, diarrhea, nausea and vomiting.   Musculoskeletal: Negative for myalgias, neck pain and neck stiffness.   Skin: Negative for rash.   Neurological: Positive for facial asymmetry, speech difficulty and weakness. Negative for dizziness and light-headedness.   Psychiatric/Behavioral: Positive for confusion.     Objective:     Vital Signs (Most Recent):  Temp: 98 °F (36.7 °C) (06/28/20 1720)  Pulse: 77 (06/28/20 1842)  Resp: 17 (06/28/20 1845)  BP: (!) 147/65 (06/28/20 1846)  SpO2: 99 % (06/28/20 1842) Vital Signs (24h Range):  Temp:  [98 °F (36.7 °C)] 98 °F (36.7 °C)  Pulse:  [77-84] 77  Resp:  [17-21] 17  SpO2:  [96 %-100 %] 99 %  BP: (126-147)/(59-73) 147/65     Weight: 63.5 kg (140 lb)  Body mass index is 18.99 kg/m².    Physical Exam  Vitals signs and nursing note reviewed.   Constitutional:       General: She is not in acute distress.     Appearance: Normal appearance. She is not ill-appearing, toxic-appearing or diaphoretic.      Comments: Overall well-appearing, nontoxic. Thin, pleasant, elderly female.   HENT:      Head: Normocephalic and atraumatic.      Mouth/Throat:      Mouth: Mucous membranes are moist.      Comments: Tongue midline  Eyes:      Extraocular Movements: Extraocular movements intact.      Comments: OS with scar to cornea. PERRLA. No nystagmus.   Neck:      Musculoskeletal: Normal range of motion and neck supple.    Cardiovascular:      Rate and Rhythm: Normal rate and regular rhythm.      Pulses: Normal pulses.   Pulmonary:      Effort: Pulmonary effort is normal. No respiratory distress.      Breath sounds: Normal breath sounds.      Comments: Faint expir rhonchi to L base  Abdominal:      Tenderness: There is no abdominal tenderness.   Musculoskeletal: Normal range of motion.   Skin:     General: Skin is warm.   Neurological:      Mental Status: She is alert and oriented to person, place, and time. Mental status is at baseline.      Comments: Grossly equal , shrug, biceps, triceps, hip flexion, ankle dorsiflexion/plantarflexion strength bilaterally. No UE or LE motor drift. Symmetric finger to nose. No gross sensory deficit.   Psychiatric:         Mood and Affect: Mood normal.         Behavior: Behavior normal.         Thought Content: Thought content normal.         Judgment: Judgment normal.             Significant Labs: All pertinent labs within the past 24 hours have been reviewed.    Significant Imaging: I have reviewed and interpreted all pertinent imaging results/findings within the past 24 hours.     CT head:  FINDINGS:  The subcutaneous tissues are unremarkable.  The bony calvarium is intact.  There is trace mucosal thickening within the right posterior ethmoid sinus.  The remainder of the paranasal sinuses are unremarkable.  The mastoid air cells are clear.  The orbits and intraorbital contents are within normal limits.     The craniocervical junction is unremarkable.  There are no extra-axial fluid collections.  There is no evidence of intracranial hemorrhage.  There is a stable calcification within the left posterolateral thalamus.  There are calcifications in the basal ganglia.  There is unchanged appearance of an old infarction in the left posterior frontal lobe and left parietal lobe.  There is also an old infarction in the left occipital lobe.  There is an old infarction in the right ruth ann kyree.  There  are scattered hypodensities within the periventricular and subcortical white matter.  The gray-white differentiation is maintained.  There is no evidence of mass effect.     Impression:     No acute intracranial process.  MRI may be attempted for further evaluation.     Old infarctions in the left cerebral hemisphere and right kyree as above.    Assessment/Plan:     Altered mental status  Possibly with CVA/TIA today  Greater suspicion for post ictal state per telemedicine  Significantly hypoglycemic, mentation improved with D5  No hx seizure disorder  Lactic, CK pending  AAOx3, no focal deficits on my eval  Will consult neurology for evaluation  Continue glucose checks, neuro checks  MRI brain, carotid u/s in AM    CKD (chronic kidney disease)  BUN elevated from baseline since October  Will hold lisinopril    DM (diabetes mellitus)  SSI  Glucose checks      Hypoglycemia  May be related to poor intake and glipizide  SSI  Glucose checks  Hypoglycemia protocol      Seizure  Presentation is most consistent with a secondarily generalized seizure and current post-ictal state but recurrent stroke(s) in DDx.  I am not comfortable with tPA given my higher index of suspicion of non-stroke diagnosis but will pursue CTA for evidence of LVO. Of note, L vert ends in PICA per old MRA.    Possible seizure today  Neurology evaluation in AM      Tobacco abuse  Quit in March    Essential hypertension  Appears controlled  Continue HCTZ, hold lisinopril given elevated BUN  Hydralazine prn  Continue to monitor      H/O: CVA (cerebrovascular accident)  Continue neuro checks  Continue ASA, plavix, statin      VTE Risk Mitigation (From admission, onward)    None        VTE: SCDs  Diet:  Diabetic  Placed in Observation    Eamon Tomlin PA-C  Department of Hospital Medicine   [facility]

## 2020-06-29 NOTE — PLAN OF CARE
Problem: Fall Injury Risk  Goal: Absence of Fall and Fall-Related Injury  Outcome: Ongoing, Progressing  Intervention: Identify and Manage Contributors to Fall Injury Risk  Flowsheets (Taken 6/29/2020 0309)  Self-Care Promotion: independence encouraged  Medication Review/Management: medications reviewed  Intervention: Promote Injury-Free Environment  Flowsheets (Taken 6/29/2020 0309)  Safety Promotion/Fall Prevention:   assistive device/personal item within reach   bed alarm set   Fall Risk reviewed with patient/family   Fall Risk signage in place   lighting adjusted   medications reviewed   nonskid shoes/socks when out of bed   room near unit station   side rails raised x 2   instructed to call staff for mobility     Problem: Adult Inpatient Plan of Care  Goal: Plan of Care Review  Outcome: Ongoing, Progressing  Goal: Patient-Specific Goal (Individualization)  Outcome: Ongoing, Progressing  Goal: Absence of Hospital-Acquired Illness or Injury  Outcome: Ongoing, Progressing  Goal: Optimal Comfort and Wellbeing  Outcome: Ongoing, Progressing  Intervention: Provide Person-Centered Care  Flowsheets (Taken 6/29/2020 0309)  Trust Relationship/Rapport:   care explained   questions answered   questions encouraged  Goal: Readiness for Transition of Care  Outcome: Ongoing, Progressing  Goal: Rounds/Family Conference  Outcome: Ongoing, Progressing     Problem: Diabetes Comorbidity  Goal: Blood Glucose Level Within Desired Range  Outcome: Ongoing, Progressing  Intervention: Maintain Glycemic Control  Flowsheets (Taken 6/29/2020 0309)  Glycemic Management: blood glucose monitoring     Problem: Adjustment to Illness (Sepsis/Septic Shock)  Goal: Optimal Coping  Outcome: Ongoing, Progressing     Problem: Bleeding (Sepsis/Septic Shock)  Goal: Absence of Bleeding  Outcome: Ongoing, Progressing     Problem: Glycemic Control Impaired (Sepsis/Septic Shock)  Goal: Blood Glucose Level Within Desired Range  Outcome: Ongoing,  Progressing     Problem: Hemodynamic Instability (Sepsis/Septic Shock)  Goal: Effective Tissue Perfusion  Outcome: Ongoing, Progressing     Problem: Infection (Sepsis/Septic Shock)  Goal: Absence of Infection Signs/Symptoms  Outcome: Ongoing, Progressing     Problem: Nutrition Impaired (Sepsis/Septic Shock)  Goal: Optimal Nutrition Intake  Outcome: Ongoing, Progressing     Problem: Nutrition Impaired (Sepsis/Septic Shock)  Goal: Optimal Nutrition Intake  Outcome: Ongoing, Progressing     Problem: Respiratory Compromise (Sepsis/Septic Shock)  Goal: Effective Oxygenation and Ventilation  Outcome: Ongoing, Progressing     Problem: Electrolyte Imbalance (Acute Kidney Injury/Impairment)  Goal: Serum Electrolyte Balance  Outcome: Ongoing, Progressing     Problem: Fluid Imbalance (Acute Kidney Injury/Impairment)  Goal: Optimal Fluid Balance  Outcome: Ongoing, Progressing     Problem: Hematologic Alteration (Acute Kidney Injury/Impairment)  Goal: Hemoglobin, Hematocrit and Platelets Within Normal Range  Outcome: Ongoing, Progressing     Problem: Oral Intake Inadequate (Acute Kidney Injury/Impairment)  Goal: Optimal Nutrition Intake  Outcome: Ongoing, Progressing     Problem: Renal Function Impairment (Acute Kidney Injury/Impairment)  Goal: Effective Renal Function  Outcome: Ongoing, Progressing     Problem: Skin Injury Risk Increased  Goal: Skin Health and Integrity  Outcome: Ongoing, Progressing

## 2020-06-29 NOTE — PLAN OF CARE
06/29/20 1330   Discharge Assessment   Assessment information obtained from? Caregiver;Patient   Expected Length of Stay (days) 1   Communicated expected length of stay with patient/caregiver yes   Prior to hospitilization cognitive status: Alert/Oriented   Prior to hospitalization functional status: Assistive Equipment;Needs Assistance   Current cognitive status: Alert/Oriented   Current Functional Status: Assistive Equipment;Needs Assistance   Facility Arrived From: home   Lives With other relative(s)   Able to Return to Prior Arrangements yes  (pts daughter Yuliya at bedside stating that she and other family members have been working from outpatient standpoint to  pt into [facility] as ICF)   Is patient able to care for self after discharge? No  (needs equipment and person assistance)   Who are your caregiver(s) and their phone number(s)? Yuliya- [phone]   Readmission Within the Last 30 Days no previous admission in last 30 days   Patient currently being followed by outpatient case management? No   Patient currently receives any other outside agency services? No   Equipment Currently Used at Home power chair;bedside commode;walker, rolling;shower chair   Do you have any problems affording any of your prescribed medications? No   Is the patient taking medications as prescribed? yes   Does the patient have transportation home? Yes   Transportation Anticipated family or friend will provide   Does the patient receive services at the [facility]mad[facility] Clinic? No   Discharge Plan A Home with family  (with instructions to follow up outpatient with PCP as needed)   Discharge Plan B New Nursing Home placement - retirement care facility   DME Needed Upon Discharge  none   Patient/Family in Agreement with Plan yes     [id number] - [address] [address] [place] [address] [address] [place] [address]  Phone: [phone] Fax: [phone]      Received a call from pts nurse  advising that the pts daughter was at the bedside and requesting to meet with TN.  TN met with pt and daughter Yuliya at the bedside and was advised that the pt cannot care for herself at all and that they were in the process of working on getting pt ICF at [facility], Yuliya states that she spoke to Kayla at [facility] who told her to just tell the CM person working with her mother to send over the information as everything was done on their end.  TN advised that [facility] and get [facility] but that at this time the pt has a discharge order and she is in Observation status and [facility] that she will d/c to home with family care and they will have to continue placement as prior to OBSERVATION visit to the hospital.  Yuliya verbalized understanding.    1337- message sent via [facility] [facility] to [facility] to confirm information from daughter. Spoke to Nayla who states that the daughter has been working with Kayla and she is in a meeting with another family and she will have her to call TN as soon as done but that she does have any beds available on today and was not aware that the pt would be admitting to facility on today. TN to await call back from [facility].    1340- call placed to pts daughter Yuliya to advise that they do not have beds available at [facility] on today and they will not be able to take the pt on today.  But TN awaiting call back from [facility] to confirm information and that if [facility] is not able to accept the pt on today the pt will d/c to home as prior to coming to hospital and being placed in Observation status.   TN inquired how pt would transport to home if discharged today-- daughter states that the pt is able to ambulate with a rolling walker and when too weak uses a wheelchair but she will be the pts ride home as long as someone wheels her down to the car.  TN to call pts daughter when additional information  available.    1503- no word received back from [facility] and NP has advised pts daughter that it no response by 3pm that the pt will discharge to home and continue process of nursing home placement from home as prior to coming to the hospital.    1506- call to [facility] spoke to Kayla states she never told the pts daughter Yuliya that they would be able to accept the pt and that they have never completed paperwork or financials for pt to be able to come on today if they would even be able to accept her in the future as paperwork was not done-- no PASRR--  the daughter only completed the LOCET but even if all necessary paperwork had been completed and they could meet the pts needs it would not have been today as they already have 5 admits for today.

## 2020-06-29 NOTE — PROGRESS NOTES
WRITTEN HEALTHCARE DISCHARGE INFORMATION      Things that YOU are RESPONSIBLE for to Manage Your Care At Home:     1. Getting your prescriptions filled.  2. Taking you medications as directed. DO NOT MISS ANY DOSES!  3. Going to your follow-up doctor appointments. This is important because it allows the doctor to monitor your progress and to determine if any changes need to be made to your treatment plan.     If you are unable to make your follow up appointments, please call the number listed and reschedule this appointment.      ____________HELP AT HOME____________________     Experiencing any SIGNS or SYMPTOMS: YOU CAN     Schedule a same day appopintment with your Primary Care Doctor or  you can call [facility] On Call Nurse Care Line for 24/7 assistance at [phone]     If you are experience any signs or symptoms that have become severe, Call 911 and come to your nearest Emergency Room.     Thank you for choosing [facility] and allowing us to care for you.   From your care management team:      You should receive a call from [facility] within 48-72 hours to help manage your care after discharge. Please try to make sure that you answer your phone for this important phone call.    Follow-up Information     Alexia Cervantes MD.    Specialty: Internal Medicine  Why: call at time of discharge to schedule follow up appointment with PCP as needed  Contact information:  [address] [facility] [address] [phone]

## 2020-06-29 NOTE — ASSESSMENT & PLAN NOTE
Possibly with CVA/TIA today  Greater suspicion for post ictal state per telemedicine  Significantly hypoglycemic, mentation improved with D5  No hx seizure disorder  Lactic, CK pending  AAOx3, no focal deficits on my eval  Will consult neurology for evaluation  Continue glucose checks, neuro checks  MRI brain, carotid u/s in AM

## 2020-06-29 NOTE — NURSING
Patient BG 54, NP made aware gave 24g glucose tabs with vanilla pudding, will recheck BG in 30 min.

## 2020-06-30 LAB — BACTERIA UR CULT: NO GROWTH

## 2020-06-30 NOTE — DISCHARGE SUMMARY
[facility] Hospital Medicine  Discharge Summary      Patient Name: Lucina Villalpando  MRN: [id number]  Admission Date: 6/28/2020  Hospital Length of Stay: 0 days  Discharge Date and Time: 6/29/2020  Attending Physician: Milvia Alonso MD    Discharging Provider: Francia Soriano NP  Primary Care Provider: Alexia Cervantes MD      HPI:   69yo F former smoker with pmh RA, SLE, multiple CVAs, NIDDM, HTN, depression, fibromyalgia, who presents to the ED with concern for stroke.     Narrative per daughter: Pt was lying in bed, was attempting to get up to use the bedside commode, she and daughter were engaged in conversation, but pt was unable to rise and get up to the edge of the bed. She was helped over to the commode, didn't use the restroom, and then again had difficulty rising and moving. She became agitated and told daughter that she didn't need any help with moving, but was clearly having some difficulty walking, standing up. There may have been L sided facial asymmetry. Pt displayed some dysarthria as well. She was brought to the ED.  Pt denies any CP, SOB. Chronic BUNCH, related to COPD. No recent illness or sick contacts. No recent hospitalization. Compliant with all medications.    Daughter does state that she is hypoglycemic sometimes. She eats in the morning, and in the evening, and sometimes goes prolonged periods of time without eating.    On my evaluation, patient is back at baseline per daughter. No focal deficit appreciated.    * No surgery found *      Hospital Course:   Mrs. Villalpando is a 70-year-old female placed in observation for left-sided facial asymmetry , difficulty talking and weakness felt due to hypoglycemia.  No infectious process. Neurology consulted.  Stroke TIA ruled out.  Patient symptoms return to baseline prior to being seen by this provider.  Patient remained awake alert oriented x4 and no neuro focal deficit exam with good strength in all extremities.  2 D echo  showed normal EF, grade II DD, mild mod atrial enlargemetn and no WMA. PASP 39 mmHg. Telemetry no arrhythmias. Continue ASA/statin/plavix. SW and provider instructed Daughter Nora hansen to continue to proceed with [facility] as planned prior to this admission. HgbA1c 5.9. Discontinue glipizide. No further hypoglycemic episodes during observation. Patient stable to discharge home with Daughter.        Consults:   Consults (From admission, onward)        Status Ordering Provider     Inpatient consult to Neurology  Once     Provider:  Cheng Jose MD    Completed ALANA MALHOTRA          No new Assessment & Plan notes have been filed under this hospital service since the last note was generated.  Service: Hospital Medicine    Final Active Diagnoses:    Diagnosis Date Noted POA    PRINCIPAL PROBLEM:  Hypoglycemia [E16.2] 06/28/2020 Yes    Seizure [R56.9] 06/28/2020 Yes    Altered mental status [R41.82] 06/28/2020 Unknown    DM (diabetes mellitus) [E11.9] 06/28/2020 Unknown    CKD (chronic kidney disease) [N18.9] 06/28/2020 Unknown    Tobacco abuse [Z72.0] 10/29/2019 Yes    H/O: CVA (cerebrovascular accident) [Z86.73] 09/16/2015 Not Applicable    Essential hypertension [I10] 09/16/2015 Yes      Problems Resolved During this Admission:       Discharged Condition: stable    Disposition: Home or Self Care    Follow Up:  Follow-up Information     Alexia Cervantes MD.    Specialty: Internal Medicine  Why: call at time of discharge to schedule follow up appointment with PCP as needed  Contact information:  [address] [place], [place] [id number] [phone]                 Patient Instructions:   No discharge procedures on file.    Significant Diagnostic Studies: Labs: All labs within the past 24 hours have been reviewed    Pending Diagnostic Studies:     None         Medications:  Reconciled Home Medications:      Medication List      START taking these medications    hydroxychloroquine 200 mg  tablet  Commonly known as: PLAQUENIL  Take 1 tablet (200 mg total) by mouth once daily. Home medication        CONTINUE taking these medications    aspirin 325 MG EC tablet  Commonly known as: ECOTRIN  Take 1 tablet (325 mg total) by mouth once daily.     atorvastatin 40 MG tablet  Commonly known as: LIPITOR  Take 1 tablet (40 mg total) by mouth every evening.     clopidogreL 75 mg tablet  Commonly known as: PLAVIX  Take 1 tablet (75 mg total) by mouth once daily. for 21 days     ondansetron 8 MG Tbdl  Commonly known as: ZOFRAN-ODT  Take 1 tablet (8 mg total) by mouth every 8 (eight) hours as needed (Nausea).     traMADoL 50 mg tablet  Commonly known as: ULTRAM  Take 50 mg by mouth every 6 (six) hours as needed for Pain.     VITAMIN B-12 1000 MCG tablet  Generic drug: cyanocobalamin  Take 100 mcg by mouth once daily.        STOP taking these medications    acetaminophen 500 MG tablet  Commonly known as: TYLENOL     glipiZIDE 5 MG tablet  Commonly known as: GLUCOTROL     lisinopriL-hydrochlorothiazide 20-25 mg Tab  Commonly known as: PRINZIDE,ZESTORETIC     naproxen 500 MG tablet  Commonly known as: NAPROSYN     oxyCODONE 5 MG immediate release tablet  Commonly known as: ROXICODONE     QUEtiapine 25 MG Tab  Commonly known as: SEROQUEL     senna-docusate 8.6-50 mg 8.6-50 mg per tablet  Commonly known as: PERICOLACE            Indwelling Lines/Drains at time of discharge:   Lines/Drains/Airways     None                 Time spent on the discharge of patient: 35 minutes  Patient was seen and examined on the date of discharge and determined to be suitable for discharge.         Francia Soriano NP  Department of Hospital Medicine  [facility]

## 2020-08-09 ENCOUNTER — HOSPITAL ENCOUNTER (OUTPATIENT)
Facility: HOSPITAL | Age: 70
Discharge: NURSING, REHAB OR LONG-TERM CARE | End: 2020-08-10
Attending: EMERGENCY MEDICINE | Admitting: EMERGENCY MEDICINE
Payer: MEDICARE

## 2020-08-09 DIAGNOSIS — R26.9 GAIT DISTURBANCE: ICD-10-CM

## 2020-08-09 DIAGNOSIS — R79.89 ELEVATED BRAIN NATRIURETIC PEPTIDE (BNP) LEVEL: ICD-10-CM

## 2020-08-09 DIAGNOSIS — I48.91 A-FIB: ICD-10-CM

## 2020-08-09 DIAGNOSIS — M25.551 RIGHT HIP PAIN: ICD-10-CM

## 2020-08-09 DIAGNOSIS — R53.1 WEAKNESS: ICD-10-CM

## 2020-08-09 DIAGNOSIS — R07.9 CHEST PAIN: ICD-10-CM

## 2020-08-09 DIAGNOSIS — R29.6 FREQUENT FALLS: Primary | ICD-10-CM

## 2020-08-09 DIAGNOSIS — R73.9 HYPERGLYCEMIA: ICD-10-CM

## 2020-08-09 PROBLEM — R13.10 DYSPHAGIA: Status: ACTIVE | Noted: 2020-08-09

## 2020-08-09 PROBLEM — I48.19 PERSISTENT ATRIAL FIBRILLATION: Status: ACTIVE | Noted: 2020-08-09

## 2020-08-09 PROBLEM — R74.01 ELEVATED TRANSAMINASE LEVEL: Status: ACTIVE | Noted: 2020-08-09

## 2020-08-09 PROBLEM — M32.9 LUPUS: Status: ACTIVE | Noted: 2020-08-09

## 2020-08-09 LAB
ALBUMIN SERPL BCP-MCNC: 3.1 G/DL (ref 3.5–5.2)
ALLENS TEST: ABNORMAL
ALP SERPL-CCNC: 177 U/L (ref 55–135)
ALT SERPL W/O P-5'-P-CCNC: 83 U/L (ref 10–44)
ANION GAP SERPL CALC-SCNC: 7 MMOL/L (ref 8–16)
ANION GAP SERPL CALC-SCNC: 9 MMOL/L (ref 8–16)
AST SERPL-CCNC: 100 U/L (ref 10–40)
B-OH-BUTYR BLD STRIP-SCNC: 0 MMOL/L (ref 0–0.5)
BACTERIA #/AREA URNS HPF: NORMAL /HPF
BASOPHILS # BLD AUTO: 0.02 K/UL (ref 0–0.2)
BASOPHILS NFR BLD: 0.6 % (ref 0–1.9)
BILIRUB SERPL-MCNC: 0.3 MG/DL (ref 0.1–1)
BILIRUB UR QL STRIP: NEGATIVE
BNP SERPL-MCNC: 584 PG/ML (ref 0–99)
BUN SERPL-MCNC: 20 MG/DL (ref 8–23)
BUN SERPL-MCNC: 21 MG/DL (ref 8–23)
CALCIUM SERPL-MCNC: 8.5 MG/DL (ref 8.7–10.5)
CALCIUM SERPL-MCNC: 9.4 MG/DL (ref 8.7–10.5)
CHLORIDE SERPL-SCNC: 105 MMOL/L (ref 95–110)
CHLORIDE SERPL-SCNC: 105 MMOL/L (ref 95–110)
CHOLEST SERPL-MCNC: 149 MG/DL (ref 120–199)
CHOLEST/HDLC SERPL: 3 {RATIO} (ref 2–5)
CLARITY UR: CLEAR
CO2 SERPL-SCNC: 22 MMOL/L (ref 23–29)
CO2 SERPL-SCNC: 24 MMOL/L (ref 23–29)
COLOR UR: YELLOW
CREAT SERPL-MCNC: 1 MG/DL (ref 0.5–1.4)
CREAT SERPL-MCNC: 1.2 MG/DL (ref 0.5–1.4)
CRP SERPL-MCNC: 0.5 MG/L (ref 0–8.2)
DELSYS: ABNORMAL
DIFFERENTIAL METHOD: ABNORMAL
EOSINOPHIL # BLD AUTO: 0 K/UL (ref 0–0.5)
EOSINOPHIL NFR BLD: 0.9 % (ref 0–8)
ERYTHROCYTE [DISTWIDTH] IN BLOOD BY AUTOMATED COUNT: 14 % (ref 11.5–14.5)
ERYTHROCYTE [DISTWIDTH] IN BLOOD BY AUTOMATED COUNT: 14.2 % (ref 11.5–14.5)
ERYTHROCYTE [SEDIMENTATION RATE] IN BLOOD BY WESTERGREN METHOD: 90 MM/HR (ref 0–20)
EST. GFR  (AFRICAN AMERICAN): 53 ML/MIN/1.73 M^2
EST. GFR  (AFRICAN AMERICAN): >60 ML/MIN/1.73 M^2
EST. GFR  (NON AFRICAN AMERICAN): 46 ML/MIN/1.73 M^2
EST. GFR  (NON AFRICAN AMERICAN): 58 ML/MIN/1.73 M^2
ETHANOL SERPL-MCNC: <10 MG/DL
FOLATE SERPL-MCNC: 8.1 NG/ML (ref 4–24)
GLUCOSE SERPL-MCNC: 143 MG/DL (ref 70–110)
GLUCOSE SERPL-MCNC: 259 MG/DL (ref 70–110)
GLUCOSE SERPL-MCNC: 320 MG/DL (ref 70–110)
GLUCOSE UR QL STRIP: ABNORMAL
HCO3 UR-SCNC: 24 MMOL/L (ref 24–28)
HCT VFR BLD AUTO: 30.2 % (ref 37–48.5)
HCT VFR BLD AUTO: 34.2 % (ref 37–48.5)
HDLC SERPL-MCNC: 49 MG/DL (ref 40–75)
HDLC SERPL: 32.9 % (ref 20–50)
HGB BLD-MCNC: 10.8 G/DL (ref 12–16)
HGB BLD-MCNC: 9.6 G/DL (ref 12–16)
HGB UR QL STRIP: NEGATIVE
HYALINE CASTS #/AREA URNS LPF: NORMAL /LPF
IMM GRANULOCYTES # BLD AUTO: 0.01 K/UL (ref 0–0.04)
IMM GRANULOCYTES NFR BLD AUTO: 0.3 % (ref 0–0.5)
INR PPP: 1 (ref 0.8–1.2)
KETONES UR QL STRIP: NEGATIVE
LDLC SERPL CALC-MCNC: 84 MG/DL (ref 63–159)
LEUKOCYTE ESTERASE UR QL STRIP: NEGATIVE
LIPASE SERPL-CCNC: 37 U/L (ref 4–60)
LYMPHOCYTES # BLD AUTO: 1.1 K/UL (ref 1–4.8)
LYMPHOCYTES NFR BLD: 34 % (ref 18–48)
MAGNESIUM SERPL-MCNC: 1.7 MG/DL (ref 1.6–2.6)
MCH RBC QN AUTO: 27.5 PG (ref 27–31)
MCH RBC QN AUTO: 28.5 PG (ref 27–31)
MCHC RBC AUTO-ENTMCNC: 31.6 G/DL (ref 32–36)
MCHC RBC AUTO-ENTMCNC: 31.8 G/DL (ref 32–36)
MCV RBC AUTO: 87 FL (ref 82–98)
MCV RBC AUTO: 90 FL (ref 82–98)
MICROSCOPIC COMMENT: NORMAL
MODE: ABNORMAL
MONOCYTES # BLD AUTO: 0.2 K/UL (ref 0.3–1)
MONOCYTES NFR BLD: 6.8 % (ref 4–15)
NEUTROPHILS # BLD AUTO: 1.9 K/UL (ref 1.8–7.7)
NEUTROPHILS NFR BLD: 57.4 % (ref 38–73)
NITRITE UR QL STRIP: NEGATIVE
NONHDLC SERPL-MCNC: 100 MG/DL
NRBC BLD-RTO: 0 /100 WBC
PCO2 BLDA: 39.6 MMHG (ref 35–45)
PH SMN: 7.39 [PH] (ref 7.35–7.45)
PH UR STRIP: 6 [PH] (ref 5–8)
PHOSPHATE SERPL-MCNC: 2.5 MG/DL (ref 2.7–4.5)
PLATELET # BLD AUTO: 206 K/UL (ref 150–350)
PLATELET # BLD AUTO: 225 K/UL (ref 150–350)
PMV BLD AUTO: 11.7 FL (ref 9.2–12.9)
PMV BLD AUTO: 11.7 FL (ref 9.2–12.9)
PO2 BLDA: 30 MMHG (ref 40–60)
POC BE: -1 MMOL/L
POC SATURATED O2: 58 % (ref 95–100)
POC TCO2: 25 MMOL/L (ref 24–29)
POCT GLUCOSE: 152 MG/DL (ref 70–110)
POCT GLUCOSE: 249 MG/DL (ref 70–110)
POCT GLUCOSE: 259 MG/DL (ref 70–110)
POCT GLUCOSE: 299 MG/DL (ref 70–110)
POCT GLUCOSE: 308 MG/DL (ref 70–110)
POCT GLUCOSE: 311 MG/DL (ref 70–110)
POTASSIUM SERPL-SCNC: 3.7 MMOL/L (ref 3.5–5.1)
POTASSIUM SERPL-SCNC: 4 MMOL/L (ref 3.5–5.1)
PROT SERPL-MCNC: 8.1 G/DL (ref 6–8.4)
PROT UR QL STRIP: ABNORMAL
PROTHROMBIN TIME: 10.8 SEC (ref 9–12.5)
RBC # BLD AUTO: 3.37 M/UL (ref 4–5.4)
RBC # BLD AUTO: 3.93 M/UL (ref 4–5.4)
RBC #/AREA URNS HPF: 0 /HPF (ref 0–4)
SAMPLE: ABNORMAL
SARS-COV-2 RDRP RESP QL NAA+PROBE: NEGATIVE
SITE: ABNORMAL
SODIUM SERPL-SCNC: 136 MMOL/L (ref 136–145)
SODIUM SERPL-SCNC: 136 MMOL/L (ref 136–145)
SP GR UR STRIP: 1.01 (ref 1–1.03)
SP02: 95
SQUAMOUS #/AREA URNS HPF: NORMAL /HPF
TRIGL SERPL-MCNC: 80 MG/DL (ref 30–150)
TROPONIN I SERPL DL<=0.01 NG/ML-MCNC: <0.006 NG/ML (ref 0–0.03)
TSH SERPL DL<=0.005 MIU/L-ACNC: 1.16 UIU/ML (ref 0.4–4)
URN SPEC COLLECT METH UR: ABNORMAL
UROBILINOGEN UR STRIP-ACNC: ABNORMAL EU/DL
VIT B12 SERPL-MCNC: 570 PG/ML (ref 210–950)
WBC # BLD AUTO: 3.24 K/UL (ref 3.9–12.7)
WBC # BLD AUTO: 3.31 K/UL (ref 3.9–12.7)
WBC #/AREA URNS HPF: 1 /HPF (ref 0–5)
YEAST URNS QL MICRO: NORMAL

## 2020-08-09 PROCEDURE — 84484 ASSAY OF TROPONIN QUANT: CPT

## 2020-08-09 PROCEDURE — 84100 ASSAY OF PHOSPHORUS: CPT

## 2020-08-09 PROCEDURE — 82607 VITAMIN B-12: CPT

## 2020-08-09 PROCEDURE — 63600175 PHARM REV CODE 636 W HCPCS: Performed by: EMERGENCY MEDICINE

## 2020-08-09 PROCEDURE — 99214 OFFICE O/P EST MOD 30 MIN: CPT | Mod: ,,, | Performed by: PSYCHIATRY & NEUROLOGY

## 2020-08-09 PROCEDURE — 99214 PR OFFICE/OUTPT VISIT, EST, LEVL IV, 30-39 MIN: ICD-10-PCS | Mod: ,,, | Performed by: PSYCHIATRY & NEUROLOGY

## 2020-08-09 PROCEDURE — 85610 PROTHROMBIN TIME: CPT

## 2020-08-09 PROCEDURE — 82010 KETONE BODYS QUAN: CPT

## 2020-08-09 PROCEDURE — 99220 PR INITIAL OBSERVATION CARE,LEVL III: CPT | Mod: 25,,, | Performed by: INTERNAL MEDICINE

## 2020-08-09 PROCEDURE — 92610 EVALUATE SWALLOWING FUNCTION: CPT

## 2020-08-09 PROCEDURE — 80048 BASIC METABOLIC PNL TOTAL CA: CPT

## 2020-08-09 PROCEDURE — 83735 ASSAY OF MAGNESIUM: CPT

## 2020-08-09 PROCEDURE — 99900035 HC TECH TIME PER 15 MIN (STAT)

## 2020-08-09 PROCEDURE — 85025 COMPLETE CBC W/AUTO DIFF WBC: CPT

## 2020-08-09 PROCEDURE — 97162 PT EVAL MOD COMPLEX 30 MIN: CPT | Performed by: PHYSICAL THERAPIST

## 2020-08-09 PROCEDURE — 96375 TX/PRO/DX INJ NEW DRUG ADDON: CPT | Performed by: EMERGENCY MEDICINE

## 2020-08-09 PROCEDURE — 99285 EMERGENCY DEPT VISIT HI MDM: CPT | Mod: 25

## 2020-08-09 PROCEDURE — 85652 RBC SED RATE AUTOMATED: CPT

## 2020-08-09 PROCEDURE — 63600175 PHARM REV CODE 636 W HCPCS: Performed by: NURSE PRACTITIONER

## 2020-08-09 PROCEDURE — 85027 COMPLETE CBC AUTOMATED: CPT | Mod: 91

## 2020-08-09 PROCEDURE — 25000003 PHARM REV CODE 250: Performed by: NURSE PRACTITIONER

## 2020-08-09 PROCEDURE — 93005 ELECTROCARDIOGRAM TRACING: CPT

## 2020-08-09 PROCEDURE — 83880 ASSAY OF NATRIURETIC PEPTIDE: CPT

## 2020-08-09 PROCEDURE — 96374 THER/PROPH/DIAG INJ IV PUSH: CPT

## 2020-08-09 PROCEDURE — P9612 CATHETERIZE FOR URINE SPEC: HCPCS

## 2020-08-09 PROCEDURE — 80074 ACUTE HEPATITIS PANEL: CPT

## 2020-08-09 PROCEDURE — 84443 ASSAY THYROID STIM HORMONE: CPT

## 2020-08-09 PROCEDURE — 83690 ASSAY OF LIPASE: CPT

## 2020-08-09 PROCEDURE — C9399 UNCLASSIFIED DRUGS OR BIOLOG: HCPCS | Performed by: NURSE PRACTITIONER

## 2020-08-09 PROCEDURE — 80320 DRUG SCREEN QUANTALCOHOLS: CPT

## 2020-08-09 PROCEDURE — 81000 URINALYSIS NONAUTO W/SCOPE: CPT | Mod: 59

## 2020-08-09 PROCEDURE — 82962 GLUCOSE BLOOD TEST: CPT

## 2020-08-09 PROCEDURE — 82746 ASSAY OF FOLIC ACID SERUM: CPT

## 2020-08-09 PROCEDURE — 86592 SYPHILIS TEST NON-TREP QUAL: CPT

## 2020-08-09 PROCEDURE — 96361 HYDRATE IV INFUSION ADD-ON: CPT

## 2020-08-09 PROCEDURE — 82803 BLOOD GASES ANY COMBINATION: CPT

## 2020-08-09 PROCEDURE — G0378 HOSPITAL OBSERVATION PER HR: HCPCS

## 2020-08-09 PROCEDURE — 93010 ELECTROCARDIOGRAM REPORT: CPT | Mod: ,,, | Performed by: INTERNAL MEDICINE

## 2020-08-09 PROCEDURE — 86140 C-REACTIVE PROTEIN: CPT

## 2020-08-09 PROCEDURE — 99220 PR INITIAL OBSERVATION CARE,LEVL III: ICD-10-PCS | Mod: 25,,, | Performed by: INTERNAL MEDICINE

## 2020-08-09 PROCEDURE — 25000003 PHARM REV CODE 250: Performed by: EMERGENCY MEDICINE

## 2020-08-09 PROCEDURE — 25000003 PHARM REV CODE 250: Performed by: INTERNAL MEDICINE

## 2020-08-09 PROCEDURE — 80321 ALCOHOLS BIOMARKERS 1OR 2: CPT

## 2020-08-09 PROCEDURE — 36415 COLL VENOUS BLD VENIPUNCTURE: CPT

## 2020-08-09 PROCEDURE — U0002 COVID-19 LAB TEST NON-CDC: HCPCS

## 2020-08-09 PROCEDURE — 80061 LIPID PANEL: CPT

## 2020-08-09 PROCEDURE — 80053 COMPREHEN METABOLIC PANEL: CPT

## 2020-08-09 PROCEDURE — 93010 EKG 12-LEAD: ICD-10-PCS | Mod: ,,, | Performed by: INTERNAL MEDICINE

## 2020-08-09 PROCEDURE — 94761 N-INVAS EAR/PLS OXIMETRY MLT: CPT

## 2020-08-09 PROCEDURE — 97165 OT EVAL LOW COMPLEX 30 MIN: CPT

## 2020-08-09 PROCEDURE — 96372 THER/PROPH/DIAG INJ SC/IM: CPT | Mod: 59 | Performed by: EMERGENCY MEDICINE

## 2020-08-09 RX ORDER — INSULIN ASPART 100 [IU]/ML
5 INJECTION, SOLUTION INTRAVENOUS; SUBCUTANEOUS
Status: DISCONTINUED | OUTPATIENT
Start: 2020-08-10 | End: 2020-08-10

## 2020-08-09 RX ORDER — IPRATROPIUM BROMIDE AND ALBUTEROL SULFATE 2.5; .5 MG/3ML; MG/3ML
3 SOLUTION RESPIRATORY (INHALATION) EVERY 4 HOURS PRN
Status: DISCONTINUED | OUTPATIENT
Start: 2020-08-09 | End: 2020-08-10 | Stop reason: HOSPADM

## 2020-08-09 RX ORDER — SODIUM CHLORIDE 0.9 % (FLUSH) 0.9 %
10 SYRINGE (ML) INJECTION
Status: DISCONTINUED | OUTPATIENT
Start: 2020-08-09 | End: 2020-08-10 | Stop reason: HOSPADM

## 2020-08-09 RX ORDER — IBUPROFEN 200 MG
24 TABLET ORAL
Status: DISCONTINUED | OUTPATIENT
Start: 2020-08-09 | End: 2020-08-10 | Stop reason: HOSPADM

## 2020-08-09 RX ORDER — IBUPROFEN 200 MG
16 TABLET ORAL
Status: DISCONTINUED | OUTPATIENT
Start: 2020-08-09 | End: 2020-08-10 | Stop reason: HOSPADM

## 2020-08-09 RX ORDER — METOPROLOL TARTRATE 50 MG/1
50 TABLET ORAL 2 TIMES DAILY
Status: DISCONTINUED | OUTPATIENT
Start: 2020-08-09 | End: 2020-08-10 | Stop reason: HOSPADM

## 2020-08-09 RX ORDER — GLUCAGON 1 MG
1 KIT INJECTION
Status: DISCONTINUED | OUTPATIENT
Start: 2020-08-09 | End: 2020-08-10 | Stop reason: HOSPADM

## 2020-08-09 RX ORDER — INSULIN ASPART 100 [IU]/ML
3 INJECTION, SOLUTION INTRAVENOUS; SUBCUTANEOUS
Status: DISCONTINUED | OUTPATIENT
Start: 2020-08-09 | End: 2020-08-09

## 2020-08-09 RX ORDER — INSULIN ASPART 100 [IU]/ML
0-5 INJECTION, SOLUTION INTRAVENOUS; SUBCUTANEOUS
Status: DISCONTINUED | OUTPATIENT
Start: 2020-08-09 | End: 2020-08-10 | Stop reason: HOSPADM

## 2020-08-09 RX ORDER — ACETAMINOPHEN 500 MG
500 TABLET ORAL EVERY 6 HOURS PRN
Status: DISCONTINUED | OUTPATIENT
Start: 2020-08-09 | End: 2020-08-10 | Stop reason: HOSPADM

## 2020-08-09 RX ORDER — SODIUM CHLORIDE 9 MG/ML
INJECTION, SOLUTION INTRAVENOUS ONCE
Status: DISCONTINUED | OUTPATIENT
Start: 2020-08-09 | End: 2020-08-10 | Stop reason: HOSPADM

## 2020-08-09 RX ORDER — METOPROLOL TARTRATE 25 MG/1
25 TABLET, FILM COATED ORAL 2 TIMES DAILY
Status: DISCONTINUED | OUTPATIENT
Start: 2020-08-09 | End: 2020-08-09

## 2020-08-09 RX ORDER — MORPHINE SULFATE 10 MG/ML
2 INJECTION INTRAMUSCULAR; INTRAVENOUS; SUBCUTANEOUS
Status: COMPLETED | OUTPATIENT
Start: 2020-08-09 | End: 2020-08-09

## 2020-08-09 RX ORDER — ONDANSETRON 2 MG/ML
4 INJECTION INTRAMUSCULAR; INTRAVENOUS EVERY 8 HOURS PRN
Status: DISCONTINUED | OUTPATIENT
Start: 2020-08-09 | End: 2020-08-10 | Stop reason: HOSPADM

## 2020-08-09 RX ORDER — ENOXAPARIN SODIUM 100 MG/ML
1 INJECTION SUBCUTANEOUS
Status: DISCONTINUED | OUTPATIENT
Start: 2020-08-09 | End: 2020-08-10 | Stop reason: HOSPADM

## 2020-08-09 RX ORDER — ASPIRIN 325 MG
325 TABLET, DELAYED RELEASE (ENTERIC COATED) ORAL DAILY
Status: DISCONTINUED | OUTPATIENT
Start: 2020-08-09 | End: 2020-08-10 | Stop reason: HOSPADM

## 2020-08-09 RX ORDER — HYDROXYCHLOROQUINE SULFATE 200 MG/1
200 TABLET, FILM COATED ORAL DAILY
Status: DISCONTINUED | OUTPATIENT
Start: 2020-08-09 | End: 2020-08-10 | Stop reason: HOSPADM

## 2020-08-09 RX ORDER — CLOPIDOGREL BISULFATE 75 MG/1
75 TABLET ORAL DAILY
Status: DISCONTINUED | OUTPATIENT
Start: 2020-08-09 | End: 2020-08-09

## 2020-08-09 RX ORDER — ACETAMINOPHEN 500 MG
1000 TABLET ORAL
Status: COMPLETED | OUTPATIENT
Start: 2020-08-09 | End: 2020-08-09

## 2020-08-09 RX ORDER — AMOXICILLIN 250 MG
1 CAPSULE ORAL 2 TIMES DAILY
Status: DISCONTINUED | OUTPATIENT
Start: 2020-08-09 | End: 2020-08-10 | Stop reason: HOSPADM

## 2020-08-09 RX ORDER — METOPROLOL TARTRATE 1 MG/ML
5 INJECTION, SOLUTION INTRAVENOUS ONCE
Status: COMPLETED | OUTPATIENT
Start: 2020-08-09 | End: 2020-08-09

## 2020-08-09 RX ORDER — ATORVASTATIN CALCIUM 40 MG/1
40 TABLET, FILM COATED ORAL NIGHTLY
Status: DISCONTINUED | OUTPATIENT
Start: 2020-08-09 | End: 2020-08-10 | Stop reason: HOSPADM

## 2020-08-09 RX ADMIN — SODIUM CHLORIDE 1000 ML: 0.9 INJECTION, SOLUTION INTRAVENOUS at 02:08

## 2020-08-09 RX ADMIN — DOCUSATE SODIUM 50 MG AND SENNOSIDES 8.6 MG 1 TABLET: 8.6; 5 TABLET, FILM COATED ORAL at 09:08

## 2020-08-09 RX ADMIN — INSULIN ASPART 4 UNITS: 100 INJECTION, SOLUTION INTRAVENOUS; SUBCUTANEOUS at 05:08

## 2020-08-09 RX ADMIN — ENOXAPARIN SODIUM 60 MG: 60 INJECTION SUBCUTANEOUS at 08:08

## 2020-08-09 RX ADMIN — METOPROLOL TARTRATE 5 MG: 5 INJECTION INTRAVENOUS at 08:08

## 2020-08-09 RX ADMIN — ACETAMINOPHEN 1000 MG: 500 TABLET ORAL at 05:08

## 2020-08-09 RX ADMIN — ATORVASTATIN CALCIUM 40 MG: 40 TABLET, FILM COATED ORAL at 09:08

## 2020-08-09 RX ADMIN — INSULIN ASPART 3 UNITS: 100 INJECTION, SOLUTION INTRAVENOUS; SUBCUTANEOUS at 05:08

## 2020-08-09 RX ADMIN — METOPROLOL TARTRATE 25 MG: 25 TABLET, FILM COATED ORAL at 10:08

## 2020-08-09 RX ADMIN — INSULIN ASPART 3 UNITS: 100 INJECTION, SOLUTION INTRAVENOUS; SUBCUTANEOUS at 10:08

## 2020-08-09 RX ADMIN — ENOXAPARIN SODIUM 60 MG: 60 INJECTION SUBCUTANEOUS at 09:08

## 2020-08-09 RX ADMIN — ASPIRIN 325 MG: 325 TABLET, DELAYED RELEASE ORAL at 08:08

## 2020-08-09 RX ADMIN — HYDROXYCHLOROQUINE SULFATE 200 MG: 200 TABLET, FILM COATED ORAL at 08:08

## 2020-08-09 RX ADMIN — MORPHINE SULFATE 2 MG: 10 INJECTION INTRAVENOUS at 05:08

## 2020-08-09 RX ADMIN — DOCUSATE SODIUM 50 MG AND SENNOSIDES 8.6 MG 1 TABLET: 8.6; 5 TABLET, FILM COATED ORAL at 08:08

## 2020-08-09 RX ADMIN — INSULIN DETEMIR 4 UNITS: 100 INJECTION, SOLUTION SUBCUTANEOUS at 08:08

## 2020-08-09 RX ADMIN — METOPROLOL TARTRATE 50 MG: 50 TABLET, FILM COATED ORAL at 10:08

## 2020-08-09 NOTE — NURSING
Patient arrived to unit via stretcher with transport. Telemetry monitor in place. Patient in no apparent distress. Will continue to monitor.

## 2020-08-09 NOTE — ASSESSMENT & PLAN NOTE
with CXR suggest early edema/infiltrate. Bilateral feet edema x 1week. Denies chest pain, SOB, no hypoxic in ED. Troponin negative. Echo 6/29/2020 EF 60% grade II DD.    - Given IVF with EMS and ED due to hyperglycemia/dehydration  - Hold off IVF  - Consider Lasix IV if symptoms getting worse.

## 2020-08-09 NOTE — PLAN OF CARE
Problem: Occupational Therapy Goal  Goal: Occupational Therapy Goal  Description: Goals to be met by: 8/23/20     Patient will increase functional independence with ADLs by performing:    UE Dressing with Minimal Assistance.  LE Dressing with Moderate Assistance.  Grooming while seated with Set-up Assistance.  Toileting from toilet with Moderate Assistance for hygiene and clothing management.   Toilet transfer to toilet with Contact Guard Assistance.    Outcome: Ongoing, Progressing     OT evaluation completed and POC established.  Margy Alfredo OT  8/9/2020

## 2020-08-09 NOTE — CARE UPDATE
Please see Diane Soriano NP HP for full assessment and plan. Mrs. Villalpando is a 71 yo female placed in observation for fall and generalized weakness now new onset atrial fib. She lives with her god child and baseline use a walker. Yesterday afternoon, patient was walking then felt weak then she slide down and buttock hit floor and head hit walker. No hypoglycemia. Last admission 6/28-6/29 for left facial symmetry, difficulty talking and weakness salt at that time to be from hypoglycemia.  On admit patient EKG showed normal sinus rhythm 64 no evidence of ischemia. On arrival unit about 630 am was noted to be in atrial fib 100-120's. I do not see previous EKG with atrial fib and patient not sure but was told irregular heart beat. Last 2 D echo on 6/29/20 showed EF 60%, grade II DD, mild mod left enlargement. Currently no neuro deficit on exam in bed and good strength in all extremities. Neurology and Cardiology consult. MRI brain. Continue ASA, statin, Start BB and lovenox BID. P/OT consult  CHF 1  HTN 1  >75 2  Between 65-74 1  Stroke /TIA/TE 2  Vascular disease (Previous MI,PAD or aortic plaque )1   DM 1  Female 1  [id number] Score 2 or more     Francia Soriano NP  Staff: Nichelle Baumann MD

## 2020-08-09 NOTE — HPI
"   HPI: This is 70 y.o female with DM2, CKD, multiple CVAs, HTN, depression, leukopenia, RA who presents to the hospital with a chief complaint of bilateral weakness with associated difficulty walking for the past week, that has caused her to fall 4 times. Patient denies hitting her head or loss of consciousness. Patient reports associated symptoms include both feet swelling, nausea, abdominal pain with eating, and "food stuck at throat" sensation. She denies fever, chill, SOB,  vomiting, or diarrhea. She reports numbness in her bilateral fingertips. She reports recently she was able to walk around her house with using her walker.  She reports she takes 3 pills at home daily but can't tell me what they are. Per EMS her CBG was 435 and given 100 cc fluid with EMS. Of note, she was recently discharged for suspected stroke rule out on 6/30/2020 and her diabetes medications was discontinued due to hypoglycemia.      In ED, COVID negative. CXR suggest early interstitial edema/infiltrate.  CT head/CT pelvis/Hip xray unremarkable. CT lumbar spine with bilateral perinephric stranding left greater than right, correlation for UTI/pyelonephritis is needed. UA no infection. Labs with elevated ; AST//83. IVF 1L given in ED, hyperglycemia improved but patient continues to be unable to ambulate.     Nited to Be in new onset A-fib this AM HR   Poor historian  Denies CP. Mild SOB  EKG NSR PRWP    Troponin negative    Echo 6/29/20  · Normal left ventricular systolic function. The estimated ejection fraction is 60%.  · Mild concentric left ventricular hypertrophy.  · Grade II (moderate) left ventricular diastolic dysfunction consistent with pseudonormalization.  · Mild to moderate left atrial enlargement.  · Normal right ventricular systolic function.  · Mild to moderate tricuspid regurgitation.  · Mild right atrial enlargement.  · Normal central venous pressure (3 mmHg).  · The estimated PA systolic " pressure is 39 mmHg.    Admitted 6/28/20  69yo F former smoker with pmh RA, SLE, multiple CVAs, NIDDM, HTN, depression, fibromyalgia, who presents to the ED with concern for stroke.     Narrative per daughter: Pt was lying in bed, was attempting to get up to use the bedside commode, she and daughter were engaged in conversation, but pt was unable to rise and get up to the edge of the bed. She was helped over to the commode, didn't use the restroom, and then again had difficulty rising and moving. She became agitated and told daughter that she didn't need any help with moving, but was clearly having some difficulty walking, standing up. There may have been L sided facial asymmetry. Pt displayed some dysarthria as well. She was brought to the ED.  Pt denies any CP, SOB. Chronic BUNCH, related to COPD. No recent illness or sick contacts. No recent hospitalization. Compliant with all medications.     Daughter does state that she is hypoglycemic sometimes. She eats in the morning, and in the evening, and sometimes goes prolonged periods of time without eating.     On my evaluation, patient is back at baseline per daughter. No focal deficit appreciated.     Hospital Course:   Mrs. Villalpando is a 70-year-old female placed in observation for left-sided facial asymmetry , difficulty talking and weakness felt due to hypoglycemia.  No infectious process. Neurology consulted.  Stroke TIA ruled out.  Patient symptoms return to baseline prior to being seen by this provider.  Patient remained awake alert oriented x4 and no neuro focal deficit exam with good strength in all extremities.  2 D echo showed normal EF, grade II DD, mild mod atrial enlargemetn and no WMA. PASP 39 mmHg. Telemetry no arrhythmias. Continue ASA/statin/plavix. SW and provider instructed Daughter Nora hansen to continue to proceed with [facility] as planned prior to this admission. HgbA1c 5.9. Discontinue glipizide. No further hypoglycemic episodes during  observation. Patient stable to discharge home with Daughter.

## 2020-08-09 NOTE — ASSESSMENT & PLAN NOTE
"Complaint "food stuck at my throat" at home. Also complains some nausea but no vomit.    - SLP consult  - Nursing swallow assessment    "

## 2020-08-09 NOTE — PLAN OF CARE
08/09/20 1146   Discharge Assessment   Assessment Type Discharge Planning Assessment   Confirmed/corrected address and phone number on facesheet? Yes   Assessment information obtained from? Patient   Expected Length of Stay (days) 3   Communicated expected length of stay with patient/caregiver yes   Prior to hospitilization cognitive status: Alert/Oriented   Prior to hospitalization functional status: Needs Assistance   Current cognitive status: Alert/Oriented   Current Functional Status: Needs Assistance   Lives With alone   Able to Return to Prior Arrangements yes   Is patient able to care for self after discharge? Unable to determine at this time (comments)   Readmission Within the Last 30 Days no previous admission in last 30 days   Patient currently being followed by outpatient case management? No   Patient currently receives any other outside agency services? Yes   How many hours a day does the patient receive services? 4   Name and contact number of agency or person providing outside services LT-PCS 5 days/wk   Equipment Currently Used at Home bedside commode;rollator;bath bench   Part D Coverage [employer]   Do you have any problems affording any of your prescribed medications? No   Is the patient taking medications as prescribed? yes   Does the patient have transportation home? Yes   Does the patient receive services at the [facility]? No   Discharge Plan A Home Health   Discharge Plan B Home   DME Needed Upon Discharge  none   Patient/Family in Agreement with Plan yes

## 2020-08-09 NOTE — SUBJECTIVE & OBJECTIVE
Past Medical History:   Diagnosis Date    Arthritis     Depression     Diabetes mellitus     Fibromyalgia     Hypertension     Stroke        Past Surgical History:   Procedure Laterality Date    BACK SURGERY      2 x for ruptured disc    KNEE SURGERY      right knee-meniscus       Review of patient's allergies indicates:  No Known Allergies    No current facility-administered medications on file prior to encounter.      Current Outpatient Medications on File Prior to Encounter   Medication Sig    aspirin (ECOTRIN) 325 MG EC tablet Take 1 tablet (325 mg total) by mouth once daily.    hydroxychloroquine (PLAQUENIL) 200 mg tablet Take 1 tablet (200 mg total) by mouth once daily. Home medication    tramadol (ULTRAM) 50 mg tablet Take 50 mg by mouth every 6 (six) hours as needed for Pain.    atorvastatin (LIPITOR) 40 MG tablet Take 1 tablet (40 mg total) by mouth every evening.    clopidogrel (PLAVIX) 75 mg tablet Take 1 tablet (75 mg total) by mouth once daily. for 21 days    cyanocobalamin (VITAMIN B-12) 1000 MCG tablet Take 100 mcg by mouth once daily.    ondansetron (ZOFRAN-ODT) 8 MG TbDL Take 1 tablet (8 mg total) by mouth every 8 (eight) hours as needed (Nausea).     Family History     Problem Relation (Age of Onset)    Diabetes Mother, Sister        Tobacco Use    Smoking status: Current Some Day Smoker     Packs/day: 1.00     Years: 46.00     Pack years: 46.00     Types: Cigarettes    Smokeless tobacco: Never Used   Substance and Sexual Activity    Alcohol use: Yes     Comment: socially    Drug use: Yes     Types: Marijuana     Comment: weekly    Sexual activity: Not on file     Review of Systems   Constitutional: Positive for activity change, appetite change and fatigue. Negative for chills, diaphoresis and fever.   HENT: Positive for trouble swallowing. Negative for congestion and sore throat.    Eyes: Negative for visual disturbance.   Respiratory: Negative for cough, chest tightness,  shortness of breath and wheezing.    Cardiovascular: Positive for leg swelling. Negative for chest pain and palpitations.   Gastrointestinal: Positive for abdominal pain, constipation and nausea. Negative for diarrhea and vomiting.   Genitourinary: Positive for frequency. Negative for dysuria.   Musculoskeletal: Positive for gait problem. Negative for myalgias.   Skin: Negative for rash.   Neurological: Positive for weakness and numbness. Negative for tremors, seizures and headaches.   Psychiatric/Behavioral: Negative for confusion.     Objective:     Vital Signs (Most Recent):  Temp: 98.1 °F (36.7 °C) (08/09/20 0736)  Pulse: 103 (08/09/20 0736)  Resp: 17 (08/09/20 0736)  BP: (!) 184/105 (08/09/20 0736)  SpO2: 100 % (08/09/20 0736) Vital Signs (24h Range):  Temp:  [98 °F (36.7 °C)-98.9 °F (37.2 °C)] 98.1 °F (36.7 °C)  Pulse:  [] 103  Resp:  [17-20] 17  SpO2:  [93 %-100 %] 100 %  BP: (150-186)/() 184/105     Weight: 60 kg (132 lb 4.4 oz)  Body mass index is 20.11 kg/m².    Physical Exam  Constitutional:       General: She is not in acute distress.     Appearance: She is not ill-appearing, toxic-appearing or diaphoretic.   HENT:      Head: Normocephalic and atraumatic.      Nose: No congestion.      Mouth/Throat:      Mouth: Mucous membranes are dry.   Eyes:      Conjunctiva/sclera: Conjunctivae normal.   Neck:      Musculoskeletal: Normal range of motion.   Cardiovascular:      Rate and Rhythm: Regular rhythm. Tachycardia present.      Pulses: Normal pulses.      Heart sounds: Normal heart sounds.   Pulmonary:      Effort: Pulmonary effort is normal.      Breath sounds: Normal breath sounds. No wheezing, rhonchi or rales.   Chest:      Chest wall: No tenderness.   Abdominal:      General: Bowel sounds are normal.      Palpations: Abdomen is soft.      Tenderness: There is abdominal tenderness (TTP umbilical region). There is guarding. There is no right CVA tenderness, left CVA tenderness or rebound.    Musculoskeletal:         General: No tenderness.      Right lower leg: Edema present.      Left lower leg: Edema present.   Skin:     General: Skin is warm and dry.      Capillary Refill: Capillary refill takes 2 to 3 seconds.   Neurological:      Mental Status: She is alert and oriented to person, place, and time.      Motor: Weakness present.      Comments: Slow and slur speech   Psychiatric:         Mood and Affect: Mood normal.         Thought Content: Thought content normal.             Significant Labs:   CBC:   Recent Labs   Lab 08/09/20 0205   WBC 3.24*   HGB 9.6*   HCT 30.2*        CMP:   Recent Labs   Lab 08/09/20 0205      K 3.7      CO2 24   *   BUN 21   CREATININE 1.2   CALCIUM 8.5*   PROT 8.1   ALBUMIN 3.1*   BILITOT 0.3   ALKPHOS 177*   *   ALT 83*   ANIONGAP 7*   EGFRNONAA 46*     Cardiac Markers:   Recent Labs   Lab 08/09/20 0205   *     Troponin:   Recent Labs   Lab 08/09/20 0205   TROPONINI <0.006     Urine Culture: No results for input(s): LABURIN in the last 48 hours.  All pertinent labs within the past 24 hours have been reviewed.    Significant Imaging: I have reviewed and interpreted all pertinent imaging results/findings within the past 24 hours.

## 2020-08-09 NOTE — SUBJECTIVE & OBJECTIVE
Past Medical History:   Diagnosis Date    Arthritis     Depression     Diabetes mellitus     Fibromyalgia     Hypertension     Stroke        Past Surgical History:   Procedure Laterality Date    BACK SURGERY      2 x for ruptured disc    KNEE SURGERY      right knee-meniscus       Review of patient's allergies indicates:  No Known Allergies    No current facility-administered medications on file prior to encounter.      Current Outpatient Medications on File Prior to Encounter   Medication Sig    aspirin (ECOTRIN) 325 MG EC tablet Take 1 tablet (325 mg total) by mouth once daily.    hydroxychloroquine (PLAQUENIL) 200 mg tablet Take 1 tablet (200 mg total) by mouth once daily. Home medication    tramadol (ULTRAM) 50 mg tablet Take 50 mg by mouth every 6 (six) hours as needed for Pain.    atorvastatin (LIPITOR) 40 MG tablet Take 1 tablet (40 mg total) by mouth every evening.    clopidogrel (PLAVIX) 75 mg tablet Take 1 tablet (75 mg total) by mouth once daily. for 21 days    cyanocobalamin (VITAMIN B-12) 1000 MCG tablet Take 100 mcg by mouth once daily.    ondansetron (ZOFRAN-ODT) 8 MG TbDL Take 1 tablet (8 mg total) by mouth every 8 (eight) hours as needed (Nausea).     Family History     Problem Relation (Age of Onset)    Diabetes Mother, Sister        Tobacco Use    Smoking status: Current Some Day Smoker     Packs/day: 1.00     Years: 46.00     Pack years: 46.00     Types: Cigarettes    Smokeless tobacco: Never Used   Substance and Sexual Activity    Alcohol use: Yes     Comment: socially    Drug use: Yes     Types: Marijuana     Comment: weekly    Sexual activity: Not on file     Review of Systems   Unable to perform ROS: dementia     Objective:     Vital Signs (Most Recent):  Temp: 98.2 °F (36.8 °C) (08/09/20 1125)  Pulse: 98 (08/09/20 1125)  Resp: 16 (08/09/20 1125)  BP: (!) 176/96 (08/09/20 1125)  SpO2: 100 % (08/09/20 1217) Vital Signs (24h Range):  Temp:  [98 °F (36.7 °C)-98.9 °F (37.2  °C)] 98.2 °F (36.8 °C)  Pulse:  [] 98  Resp:  [16-20] 16  SpO2:  [93 %-100 %] 100 %  BP: (150-186)/() 176/96     Weight: 60 kg (132 lb 4.4 oz)  Body mass index is 20.11 kg/m².    SpO2: 100 %  O2 Device (Oxygen Therapy): room air      Intake/Output Summary (Last 24 hours) at 8/9/2020 1234  Last data filed at 8/9/2020 0437  Gross per 24 hour   Intake 500 ml   Output --   Net 500 ml       Lines/Drains/Airways     Peripheral Intravenous Line                 Peripheral IV - Single Lumen 08/09/20 0108 24 G Left Hand less than 1 day                Physical Exam   Constitutional: She is oriented to person, place, and time. She appears well-developed and well-nourished.   HENT:   Head: Normocephalic and atraumatic.   Eyes: Pupils are equal, round, and reactive to light. Conjunctivae are normal.   Neck: Normal range of motion. Neck supple.   Cardiovascular: Normal heart sounds and intact distal pulses. An irregularly irregular rhythm present. Tachycardia present.   Pulmonary/Chest: Effort normal and breath sounds normal.   Abdominal: Soft. Bowel sounds are normal.   Musculoskeletal: Normal range of motion.   Neurological: She is alert and oriented to person, place, and time.   Skin: Skin is warm and dry.       Significant Labs: All pertinent lab results from the last 24 hours have been reviewed.    Significant Imaging: Echocardiogram: 2D echo with color flow doppler: No results found for this or any previous visit.

## 2020-08-09 NOTE — CONSULTS
"[facility] [place]    Patient Name: Lucina Villalpando  MRN: [id number]  Admission Date: 8/9/2020  Hospital Length of Stay: 0 days  Code Status: Full Code   Attending Provider: Nichelle Baumann MD   Consulting Provider: Joao Oliva MD  Primary Care Physician: Alexia Cervantes MD  Principal Problem:Weakness    Patient information was obtained from patient and ER records.     Consults  Subjective:     Chief Complaint:  New onset A-fib        HPI: This is 70 y.o female with DM2, CKD, multiple CVAs, HTN, depression, leukopenia, RA who presents to the hospital with a chief complaint of bilateral weakness with associated difficulty walking for the past week, that has caused her to fall 4 times. Patient denies hitting her head or loss of consciousness. Patient reports associated symptoms include both feet swelling, nausea, abdominal pain with eating, and "food stuck at throat" sensation. She denies fever, chill, SOB,  vomiting, or diarrhea. She reports numbness in her bilateral fingertips. She reports recently she was able to walk around her house with using her walker.  She reports she takes 3 pills at home daily but can't tell me what they are. Per EMS her CBG was 435 and given 100 cc fluid with EMS. Of note, she was recently discharged for suspected stroke rule out on 6/30/2020 and her diabetes medications was discontinued due to hypoglycemia.      In ED, COVID negative. CXR suggest early interstitial edema/infiltrate.  CT head/CT pelvis/Hip xray unremarkable. CT lumbar spine with bilateral perinephric stranding left greater than right, correlation for UTI/pyelonephritis is needed. UA no infection. Labs with elevated ; AST//83. IVF 1L given in ED, hyperglycemia improved but patient continues to be unable to ambulate.     Nited to Be in new onset A-fib this AM HR   Poor historian  Denies CP. Mild SOB  EKG NSR PRWP    Troponin negative    Echo " 6/29/20  · Normal left ventricular systolic function. The estimated ejection fraction is 60%.  · Mild concentric left ventricular hypertrophy.  · Grade II (moderate) left ventricular diastolic dysfunction consistent with pseudonormalization.  · Mild to moderate left atrial enlargement.  · Normal right ventricular systolic function.  · Mild to moderate tricuspid regurgitation.  · Mild right atrial enlargement.  · Normal central venous pressure (3 mmHg).  · The estimated PA systolic pressure is 39 mmHg.    Admitted 6/28/20  69yo F former smoker with pmh RA, SLE, multiple CVAs, NIDDM, HTN, depression, fibromyalgia, who presents to the ED with concern for stroke.     Narrative per daughter: Pt was lying in bed, was attempting to get up to use the bedside commode, she and daughter were engaged in conversation, but pt was unable to rise and get up to the edge of the bed. She was helped over to the commode, didn't use the restroom, and then again had difficulty rising and moving. She became agitated and told daughter that she didn't need any help with moving, but was clearly having some difficulty walking, standing up. There may have been L sided facial asymmetry. Pt displayed some dysarthria as well. She was brought to the ED.  Pt denies any CP, SOB. Chronic BUNCH, related to COPD. No recent illness or sick contacts. No recent hospitalization. Compliant with all medications.     Daughter does state that she is hypoglycemic sometimes. She eats in the morning, and in the evening, and sometimes goes prolonged periods of time without eating.     On my evaluation, patient is back at baseline per daughter. No focal deficit appreciated.     Hospital Course:   Mrs. Villalpando is a 70-year-old female placed in observation for left-sided facial asymmetry , difficulty talking and weakness felt due to hypoglycemia.  No infectious process. Neurology consulted.  Stroke TIA ruled out.  Patient symptoms return to baseline prior to being seen by  this provider.  Patient remained awake alert oriented x4 and no neuro focal deficit exam with good strength in all extremities.  2 D echo showed normal EF, grade II DD, mild mod atrial enlargemetn and no WMA. PASP 39 mmHg. Telemetry no arrhythmias. Continue ASA/statin/plavix. SW and provider instructed Daughter Nora hansen to continue to proceed with [facility] as planned prior to this admission. HgbA1c 5.9. Discontinue glipizide. No further hypoglycemic episodes during observation. Patient stable to discharge home with Daughter.     Past Medical History:   Diagnosis Date    Arthritis     Depression     Diabetes mellitus     Fibromyalgia     Hypertension     Stroke        Past Surgical History:   Procedure Laterality Date    BACK SURGERY      2 x for ruptured disc    KNEE SURGERY      right knee-meniscus       Review of patient's allergies indicates:  No Known Allergies    No current facility-administered medications on file prior to encounter.      Current Outpatient Medications on File Prior to Encounter   Medication Sig    aspirin (ECOTRIN) 325 MG EC tablet Take 1 tablet (325 mg total) by mouth once daily.    hydroxychloroquine (PLAQUENIL) 200 mg tablet Take 1 tablet (200 mg total) by mouth once daily. Home medication    tramadol (ULTRAM) 50 mg tablet Take 50 mg by mouth every 6 (six) hours as needed for Pain.    atorvastatin (LIPITOR) 40 MG tablet Take 1 tablet (40 mg total) by mouth every evening.    clopidogrel (PLAVIX) 75 mg tablet Take 1 tablet (75 mg total) by mouth once daily. for 21 days    cyanocobalamin (VITAMIN B-12) 1000 MCG tablet Take 100 mcg by mouth once daily.    ondansetron (ZOFRAN-ODT) 8 MG TbDL Take 1 tablet (8 mg total) by mouth every 8 (eight) hours as needed (Nausea).     Family History     Problem Relation (Age of Onset)    Diabetes Mother, Sister        Tobacco Use    Smoking status: Current Some Day Smoker     Packs/day: 1.00     Years: 46.00     Pack years: 46.00      Types: Cigarettes    Smokeless tobacco: Never Used   Substance and Sexual Activity    Alcohol use: Yes     Comment: socially    Drug use: Yes     Types: Marijuana     Comment: weekly    Sexual activity: Not on file     Review of Systems   Unable to perform ROS: dementia     Objective:     Vital Signs (Most Recent):  Temp: 98.2 °F (36.8 °C) (08/09/20 1125)  Pulse: 98 (08/09/20 1125)  Resp: 16 (08/09/20 1125)  BP: (!) 176/96 (08/09/20 1125)  SpO2: 100 % (08/09/20 1217) Vital Signs (24h Range):  Temp:  [98 °F (36.7 °C)-98.9 °F (37.2 °C)] 98.2 °F (36.8 °C)  Pulse:  [] 98  Resp:  [16-20] 16  SpO2:  [93 %-100 %] 100 %  BP: (150-186)/() 176/96     Weight: 60 kg (132 lb 4.4 oz)  Body mass index is 20.11 kg/m².    SpO2: 100 %  O2 Device (Oxygen Therapy): room air      Intake/Output Summary (Last 24 hours) at 8/9/2020 1234  Last data filed at 8/9/2020 0437  Gross per 24 hour   Intake 500 ml   Output --   Net 500 ml       Lines/Drains/Airways     Peripheral Intravenous Line                 Peripheral IV - Single Lumen 08/09/20 0108 24 G Left Hand less than 1 day                Physical Exam   Constitutional: She is oriented to person, place, and time. She appears well-developed and well-nourished.   HENT:   Head: Normocephalic and atraumatic.   Eyes: Pupils are equal, round, and reactive to light. Conjunctivae are normal.   Neck: Normal range of motion. Neck supple.   Cardiovascular: Normal heart sounds and intact distal pulses. An irregularly irregular rhythm present. Tachycardia present.   Pulmonary/Chest: Effort normal and breath sounds normal.   Abdominal: Soft. Bowel sounds are normal.   Musculoskeletal: Normal range of motion.   Neurological: She is alert and oriented to person, place, and time.   Skin: Skin is warm and dry.       Significant Labs: All pertinent lab results from the last 24 hours have been reviewed.    Significant Imaging: Echocardiogram: 2D echo with color flow doppler: No results found  for this or any previous visit.    Assessment and Plan:     Persistent atrial fibrillation  New Dx. Mildly increased HR. Increase metoprolol. Full dose lovenox. RADHA/CV in AM if A-fib persists. Recent echo with good EF. Would place on DOAC at discharge for 1 month after CV. Poor candidate for long term OAC due to dementia and frequent falls    Elevated brain natriuretic peptide (BNP) level  Appears to have diastolic CHF - worsened by A-fib. Diuresis and afterload reduction as tolerated    Frequent falls  As above    Essential hypertension  Poorly controlled. Increase B-blocker    H/O: CVA (cerebrovascular accident)  Per primary    Type 2 diabetes mellitus with neurologic complication  Per primary        VTE Risk Mitigation (From admission, onward)         Ordered     enoxaparin injection 60 mg  Every 12 hours (non-standard times)      08/09/20 0835     Place YULI hose  Until discontinued      08/09/20 0642     Place sequential compression device  Until discontinued      08/09/20 0642     IP VTE LOW RISK PATIENT  Once      08/09/20 0642                Thank you for your consult. I will follow-up with patient. Please contact us if you have any additional questions.    Joao Oliva MD  Cardiology   [facility] [place]

## 2020-08-09 NOTE — ASSESSMENT & PLAN NOTE
Hx multiple strokes. CT head no acute process. C/o bilateral weakness with frequent fall x 1 week. A&O x 3, slow and slur speech during exam.Unsure if patient takes her ASA/plavix/statin at home. Patient reports she takes 3 pills per day but not able to tell me what they are. Will need confirm with daughter in AM  - MRI pending  - Continue ASA/Plavix/statin  - Neurology consult

## 2020-08-09 NOTE — ASSESSMENT & PLAN NOTE
with EMS. Improved with IVF given with EMS and in ED. No anion gap, no elevated beta-hydroxybutyrate. Her home diabetes medication was discontinued upon recent admit 6/30/2020 due to hypoglycemia.    - Hold IVF due to fluid overload with elevated , CXR with early edema/infiltrate, bilateral LE edema.  - Start weight base basal insulin  - SSI  - POCT AC/HS  - ADA diet

## 2020-08-09 NOTE — HPI
"This is 70 y.o female with DM2, CKD, multiple CVAs, HTN, depression, leukopenia, RA who presents to the hospital with a chief complaint of bilateral weakness with associated difficulty walking for the past week, that has caused her to fall 4 times. Patient denies hitting her head or loss of consciousness. Patient reports associated symptoms include both feet swelling, nausea, abdominal pain with eating, and "food stuck at throat" sensation. She denies fever, chill, SOB,  vomiting, or diarrhea. She reports numbness in her bilateral fingertips. She reports recently she was able to walk around her house with using her walker.  She reports she takes 3 pills at home daily but can't tell me what they are. Per EMS her CBG was 435 and given 100 cc fluid with EMS. Of note, she was recently discharged for suspected stroke rule out on 6/30/2020 and her diabetes medications was discontinued due to hypoglycemia.     In ED, COVID negative. CXR suggest early interstitial edema/infiltrate.  CT head/CT pelvis/Hip xray unremarkable. CT lumbar spine with bilateral perinephric stranding left greater than right, correlation for UTI/pyelonephritis is needed. UA no infection. Labs with elevated ; AST//83. IVF 1L given in ED, hyperglycemia improved but patient continues to be unable to ambulate.     Patient is placed in Observation to further evaluation and treatment.     "

## 2020-08-09 NOTE — ASSESSMENT & PLAN NOTE
C/o bilateral weakness associated with difficult walking and frequent falls for 1 week. Found have  with EMS, IVF given with EMS and in ED. Hyperglycemia improved but patient still not able to walk. Able to walk with walker at home prior. C/o numbness bilateral finger tips and both feet swelling. Denies SOB. CT head/CT pelvis/Hip xray/CT lumbar spine unremarkable. Hx multiple strokes.    - MRI brain pending  - Neurology consult  - PT/OT consult  - Treat hyperglycemia/dehydration  - Check TSH/RPR

## 2020-08-09 NOTE — PT/OT/SLP EVAL
Occupational Therapy   Co-Evaluation    Name: Lucina Villalpando  MRN: [id number]  Admitting Diagnosis:  Weakness     Procedure(s):  Transesophageal echo (RADHA) intra-procedure log documentation     Recommendations:     Discharge Recommendations: nursing facility, skilled  Discharge Equipment Recommendations:  none  Barriers to discharge:  None    Assessment:     Lucina Villalpando is a 70 y.o. female with a medical diagnosis of Weakness.  She presents with the following performance deficits affecting function: weakness, impaired endurance, impaired self care skills, impaired functional mobilty, gait instability, impaired balance, decreased lower extremity function, decreased safety awareness. Patient is agreeable to participate with therapy and tolerates evaluation fairly well.     Rehab Prognosis: Good; patient would benefit from acute skilled OT services to address these deficits and reach maximum level of function.       Plan:     Patient to be seen 4 x/week to address the above listed problems via self-care/home management, therapeutic activities, therapeutic exercises  · Plan of Care Expires: 09/09/20  · Plan of Care Reviewed with: patient    Subjective     Chief Complaint: Weakness in BLE  Patient/Family Comments/goals: To feel better and return home    Occupational Profile:  Living Environment: Patient lives with her godchild in a 1st floor apartment.  Previous level of function: Patient reports that she receives assistance with ADLs/IADLs from her niece, she is mod I with functional mobility using a rollator   Roles and Routines: Godmother, aunt. Patient does not drive or work.   Equipment Used at Home:  bedside commode, bath bench, rollator  Assistance upon Discharge: Family available to assist    Pain/Comfort:  Pain Rating 1: 0/10    Patients cultural, spiritual, Church conflicts given the current situation: no    Objective:     Communicated with: RN prior to session.  Patient found HOB elevated with bed  alarm, telemetry(hep lock IV) upon OT entry to room.    General Precautions: Standard, fall   Orthopedic Precautions:N/A   Braces: N/A     Occupational Performance:    Bed Mobility:    · Patient completed Supine Rolling L<>R with minimum assistance  · Patient completed Supine to Sit to R side EOB with moderate assistance for trunk and BLE advancement off bed  · Patient completed Scooting anteriorly to EOB for foot placement on floor with contact guard assistance  · Patient completed Sit to Supine with moderate assistance for BLE assistance onto bed    Functional Mobility/Transfers:  · Patient completed Sit <> Stand Transfer with moderate assistance  with  rolling walker   · Functional Mobility: ~12' with mod assist and RW, no overt LOB/SOB noted    Activities of Daily Living:  · Grooming: set-up assistance Patient participated in a face wash with a washcloth while sitting EOB with set-up assist.  · Lower Body Dressing: moderate assistance Patient doffed/donned non-slip socks while sitting EOB with mod assist.    Cognitive/Visual Perceptual:  Cognitive/Psychosocial Skills:     -       Oriented to: Person, Place, Time and Situation   -       Follows Commands/attention:Follows multistep  commands    Physical Exam:  Upper Extremity Range of Motion:     -       Right Upper Extremity: WFL  -       Left Upper Extremity: WFL  Upper Extremity Strength:    -       Right Upper Extremity: WFL  -       Left Upper Extremity: WFL    AMPAC 6 Click ADL:  AMPAC Total Score: 16    Treatment & Education:  Role of OT/evaluation  Educated on importance of sitting UIC/OOB activity with staff assistance while in acute setting to prevent further debility  Call button for assistance  Education:    Patient left HOB elevated with all lines intact, call button in reach, bed alarm on and RN notified    GOALS:   Multidisciplinary Problems     Occupational Therapy Goals        Problem: Occupational Therapy Goal    Goal Priority Disciplines Outcome  Interventions   Occupational Therapy Goal     OT, PT/OT Ongoing, Progressing    Description: Goals to be met by: 8/23/20     Patient will increase functional independence with ADLs by performing:    UE Dressing with Minimal Assistance.  LE Dressing with Moderate Assistance.  Grooming while seated with Set-up Assistance.  Toileting from toilet with Moderate Assistance for hygiene and clothing management.   Toilet transfer to toilet with Contact Guard Assistance.                     History:     Past Medical History:   Diagnosis Date    Arthritis     Depression     Diabetes mellitus     Fibromyalgia     Hypertension     Stroke          Past Surgical History:   Procedure Laterality Date    BACK SURGERY      2 x for ruptured disc    KNEE SURGERY      right knee-meniscus       Time Tracking:     OT Date of Treatment: 08/09/20  OT Start Time: 1037  OT Stop Time: 1049  OT Total Time (min): 12 min    Billable Minutes:Evaluation 12 minutes    Margy Alfredo OT  8/9/2020

## 2020-08-09 NOTE — H&P
"[facility] Medicine  History & Physical    Patient Name: Lucina Villalpando  MRN: [id number]  Admission Date: 8/9/2020  Attending Physician: Nichelle Baumann MD   Primary Care Provider: Alexia Cervantes MD         Patient information was obtained from patient and ER records.     Subjective:     Principal Problem:Weakness    Chief Complaint:   Chief Complaint   Patient presents with    Hyperglycemia     daughter called EMS because pt has been having trouble walking, upon EMS arrival , given 100ml fluids with EMS,  pt reports feeling weak and thirsty, stopped taking metformin 2 days ago and symptoms began         HPI: This is 70 y.o female with DM2, CKD, multiple CVAs, HTN, depression, leukopenia, RA who presents to the hospital with a chief complaint of bilateral weakness with associated difficulty walking for the past week, that has caused her to fall 4 times. Patient denies hitting her head or loss of consciousness. Patient reports associated symptoms include both feet swelling, nausea, abdominal pain with eating, and "food stuck at throat" sensation. She denies fever, chill, SOB,  vomiting, or diarrhea. She reports numbness in her bilateral fingertips. She reports recently she was able to walk around her house with using her walker.  She reports she takes 3 pills at home daily but can't tell me what they are. Per EMS her CBG was 435 and given 100 cc fluid with EMS. Of note, she was recently discharged for suspected stroke rule out on 6/30/2020 and her diabetes medications was discontinued due to hypoglycemia.     In ED, COVID negative. CXR suggest early interstitial edema/infiltrate.  CT head/CT pelvis/Hip xray unremarkable. CT lumbar spine with bilateral perinephric stranding left greater than right, correlation for UTI/pyelonephritis is needed. UA no infection. Labs with elevated ; AST//83. IVF 1L given in ED, hyperglycemia improved but patient continues to be " unable to ambulate.     Patient is placed in Observation to further evaluation and treatment.       Past Medical History:   Diagnosis Date    Arthritis     Depression     Diabetes mellitus     Fibromyalgia     Hypertension     Stroke        Past Surgical History:   Procedure Laterality Date    BACK SURGERY      2 x for ruptured disc    KNEE SURGERY      right knee-meniscus       Review of patient's allergies indicates:  No Known Allergies    No current facility-administered medications on file prior to encounter.      Current Outpatient Medications on File Prior to Encounter   Medication Sig    aspirin (ECOTRIN) 325 MG EC tablet Take 1 tablet (325 mg total) by mouth once daily.    hydroxychloroquine (PLAQUENIL) 200 mg tablet Take 1 tablet (200 mg total) by mouth once daily. Home medication    tramadol (ULTRAM) 50 mg tablet Take 50 mg by mouth every 6 (six) hours as needed for Pain.    atorvastatin (LIPITOR) 40 MG tablet Take 1 tablet (40 mg total) by mouth every evening.    clopidogrel (PLAVIX) 75 mg tablet Take 1 tablet (75 mg total) by mouth once daily. for 21 days    cyanocobalamin (VITAMIN B-12) 1000 MCG tablet Take 100 mcg by mouth once daily.    ondansetron (ZOFRAN-ODT) 8 MG TbDL Take 1 tablet (8 mg total) by mouth every 8 (eight) hours as needed (Nausea).     Family History     Problem Relation (Age of Onset)    Diabetes Mother, Sister        Tobacco Use    Smoking status: Current Some Day Smoker     Packs/day: 1.00     Years: 46.00     Pack years: 46.00     Types: Cigarettes    Smokeless tobacco: Never Used   Substance and Sexual Activity    Alcohol use: Yes     Comment: socially    Drug use: Yes     Types: Marijuana     Comment: weekly    Sexual activity: Not on file     Review of Systems   Constitutional: Positive for activity change, appetite change and fatigue. Negative for chills, diaphoresis and fever.   HENT: Positive for trouble swallowing. Negative for congestion and sore throat.     Eyes: Negative for visual disturbance.   Respiratory: Negative for cough, chest tightness, shortness of breath and wheezing.    Cardiovascular: Positive for leg swelling. Negative for chest pain and palpitations.   Gastrointestinal: Positive for abdominal pain, constipation and nausea. Negative for diarrhea and vomiting.   Genitourinary: Positive for frequency. Negative for dysuria.   Musculoskeletal: Positive for gait problem. Negative for myalgias.   Skin: Negative for rash.   Neurological: Positive for weakness and numbness. Negative for tremors, seizures and headaches.   Psychiatric/Behavioral: Negative for confusion.     Objective:     Vital Signs (Most Recent):  Temp: 98.1 °F (36.7 °C) (08/09/20 0736)  Pulse: 103 (08/09/20 0736)  Resp: 17 (08/09/20 0736)  BP: (!) 184/105 (08/09/20 0736)  SpO2: 100 % (08/09/20 0736) Vital Signs (24h Range):  Temp:  [98 °F (36.7 °C)-98.9 °F (37.2 °C)] 98.1 °F (36.7 °C)  Pulse:  [] 103  Resp:  [17-20] 17  SpO2:  [93 %-100 %] 100 %  BP: (150-186)/() 184/105     Weight: 60 kg (132 lb 4.4 oz)  Body mass index is 20.11 kg/m².    Physical Exam  Constitutional:       General: She is not in acute distress.     Appearance: She is not ill-appearing, toxic-appearing or diaphoretic.   HENT:      Head: Normocephalic and atraumatic.      Nose: No congestion.      Mouth/Throat:      Mouth: Mucous membranes are dry.   Eyes:      Conjunctiva/sclera: Conjunctivae normal.   Neck:      Musculoskeletal: Normal range of motion.   Cardiovascular:      Rate and Rhythm: Regular rhythm. Tachycardia present.      Pulses: Normal pulses.      Heart sounds: Normal heart sounds.   Pulmonary:      Effort: Pulmonary effort is normal.      Breath sounds: Normal breath sounds. No wheezing, rhonchi or rales.   Chest:      Chest wall: No tenderness.   Abdominal:      General: Bowel sounds are normal.      Palpations: Abdomen is soft.      Tenderness: There is abdominal tenderness (TTP umbilical  region). There is guarding. There is no right CVA tenderness, left CVA tenderness or rebound.   Musculoskeletal:         General: No tenderness.      Right lower leg: Edema present.      Left lower leg: Edema present.   Skin:     General: Skin is warm and dry.      Capillary Refill: Capillary refill takes 2 to 3 seconds.   Neurological:      Mental Status: She is alert and oriented to person, place, and time.      Motor: Weakness present.      Comments: Slow and slur speech   Psychiatric:         Mood and Affect: Mood normal.         Thought Content: Thought content normal.             Significant Labs:   CBC:   Recent Labs   Lab 08/09/20 0205   WBC 3.24*   HGB 9.6*   HCT 30.2*        CMP:   Recent Labs   Lab 08/09/20 0205      K 3.7      CO2 24   *   BUN 21   CREATININE 1.2   CALCIUM 8.5*   PROT 8.1   ALBUMIN 3.1*   BILITOT 0.3   ALKPHOS 177*   *   ALT 83*   ANIONGAP 7*   EGFRNONAA 46*     Cardiac Markers:   Recent Labs   Lab 08/09/20 0205   *     Troponin:   Recent Labs   Lab 08/09/20 0205   TROPONINI <0.006     Urine Culture: No results for input(s): LABURIN in the last 48 hours.  All pertinent labs within the past 24 hours have been reviewed.    Significant Imaging: I have reviewed and interpreted all pertinent imaging results/findings within the past 24 hours.    Assessment/Plan:     * Weakness  C/o bilateral weakness associated with difficult walking and frequent falls for 1 week. Found have  with EMS, IVF given with EMS and in ED. Hyperglycemia improved but patient still not able to walk. Able to walk with walker at home prior. C/o numbness bilateral finger tips and both feet swelling. Denies SOB. CT head/CT pelvis/Hip xray/CT lumbar spine unremarkable. Hx multiple strokes.    - MRI brain pending  - Neurology consult  - PT/OT consult  - Treat hyperglycemia/dehydration  - Check TSH/RPR      Hyperglycemia   with EMS. Improved with IVF given with EMS and  in ED. No anion gap, no elevated beta-hydroxybutyrate. Her home diabetes medication was discontinued upon recent admit 6/30/2020 due to hypoglycemia.    - Hold IVF due to fluid overload with elevated , CXR with early edema/infiltrate, bilateral LE edema.  - Start weight base basal insulin  - SSI  - POCT AC/HS  - ADA diet      Elevated transaminase level  AST//83 today. Patient denies drinking. C/o abdominal tenderness with eating. Hx pancreatitis in the past.    - Will check lipase/B12/folic acid/PETH/ESR/CRP/Hepatitis panel  - Repeat labs      Elevated brain natriuretic peptide (BNP) level   with CXR suggest early edema/infiltrate. Bilateral feet edema x 1week. Denies chest pain, SOB, no hypoxic in ED. Troponin negative. Echo 6/29/2020 EF 60% grade II DD.    - Given IVF with EMS and ED due to hyperglycemia/dehydration  - Hold off IVF  - Consider Lasix IV if symptoms getting worse.      Type 2 diabetes mellitus with neurologic complication  Lab Results   Component Value Date    HGBA1C 5.9 (H) 06/29/2020      today.   On home glipizide prior but was discontinued due to hypoglycemia. Not taking any antihyperglycemic agents at home since discharge 6/30/2020.  See above    H/O: CVA (cerebrovascular accident)  Hx multiple strokes. CT head no acute process. C/o bilateral weakness with frequent fall x 1 week. A&O x 3, slow and slur speech during exam.Unsure if patient takes her ASA/plavix/statin at home. Patient reports she takes 3 pills per day but not able to tell me what they are. Will need confirm with daughter in AM  - MRI pending  - Continue ASA/Plavix/statin  - Neurology consult      Essential hypertension  Not controlled. Not on any medication at home. Consider start on BB if neede      CKD (chronic kidney disease)  sCr 1.2/BUN 21 WNL today. At baseline. Avoid nephrotoxic agents. Renal dose medication      Lupus  No acute issue. Leukopenia 3.24 at baseline. On home Plaquenil. Continue  "Plaquenil      Dysphagia  Complaint "food stuck at my throat" at home. Also complains some nausea but no vomit.    - SLP consult  - Nursing swallow assessment        VTE Risk Mitigation (From admission, onward)         Ordered     enoxaparin injection 60 mg  Every 12 hours (non-standard times)      08/09/20 0835     Place YULI hose  Until discontinued      08/09/20 0642     Place sequential compression device  Until discontinued      08/09/20 0642     IP VTE LOW RISK PATIENT  Once      08/09/20 0642                   Diane Soriano NP  Department of Hospital Medicine   [facility]  "

## 2020-08-09 NOTE — ED NOTES
Pt's daughter Yuliya ([phone]) updated on pt's admission to room 302; all questions answered; pt's daughter provided with 3rd floor nursing station phone number for any further questions or updates

## 2020-08-09 NOTE — ED TRIAGE NOTES
Pt presents to ED via EMS c/o weakness and difficulty walking.  Denies LOC, HA, N/V/D, CP or SOB.

## 2020-08-09 NOTE — ASSESSMENT & PLAN NOTE
AST//83 today. Patient denies drinking. C/o abdominal tenderness with eating. Hx pancreatitis in the past.    - Will check lipase/B12/folic acid/PETH/ESR/CRP/Hepatitis panel  - Repeat labs

## 2020-08-09 NOTE — PT/OT/SLP EVAL
Physical Therapy Evaluation    Patient Name:  Lucina Villalpando   MRN:  [id number]    Recommendations:     Discharge Recommendations:  nursing facility, skilled   Discharge Equipment Recommendations: none   Barriers to discharge: None    Assessment:     Lucina Villalpando is a 70 y.o. female admitted with a medical diagnosis of Weakness.  She presents with the following impairments/functional limitations:  weakness, impaired endurance, impaired functional mobilty, gait instability, impaired balance, decreased lower extremity function, decreased safety awareness, pain, impaired coordination.    Rehab Prognosis: Good; patient would benefit from acute skilled PT services to address these deficits and reach maximum level of function.    Recent Surgery: Procedure(s) (LRB):  Transesophageal echo (RADHA) intra-procedure log documentation (N/A)      Plan:     During this hospitalization, patient to be seen 5 x/week to address the identified rehab impairments via gait training, therapeutic activities, therapeutic exercises and progress toward the following goals:    · Plan of Care Expires:  08/22/20    Subjective     Chief Complaint: none stated  Patient/Family Comments/goals: to return to PLOF  Pain/Comfort:  · Pain Rating 1: 0/10    Patients cultural, spiritual, Cheondoism conflicts given the current situation:      Living Environment:  Patient lives alone in a 1st floor apartment with 1 YAIMA  Prior to admission, patients level of function was Mod I.  Equipment used at home: rollator, power chair, bedside commode, bath bench.  DME owned (not currently used): none.  Upon discharge, patient will have assistance from [employer] and [employer].    Objective:     Communicated with Nurse prior to session.  Patient found supine with peripheral IV, telemetry  upon PT entry to room.    General Precautions: Standard, fall   Orthopedic Precautions:Full weight bearing   Braces: N/A     Exams:  · Cognitive Exam:  Patient is oriented to Person,  Place and Situation  · Gross Motor Coordination:  WFL  · Postural Exam:  Patient presented with the following abnormalities:    · -       Rounded shoulders  · -       Forward head  · -       Abnormal trunk flexion  · Skin Integrity/Edema:      · -       Skin integrity: Visible skin intact  · -       Edema: None noted .  · RLE ROM: WFL  · RLE Strength: WFL  · LLE ROM: WFL  · LLE Strength: Deficits: Quad = 4/5    Functional Mobility:  · Bed Mobility:     · Rolling Left:  minimum assistance  · Rolling Right: minimum assistance  · Supine to Sit: moderate assistance  · Sit to Supine: moderate assistance  · Transfers:     · Sit to Stand:  moderate assistance with rolling walker  · Gait: 12' Steppage Gait with RW and Mod A.  Pt required VC's for sequencing and TC's for RW positioning to increase safety and tolerance with task.       AM-PAC 6 CLICK MOBILITY  Total Score:12     Patient left supine with all lines intact and call button in reach.    GOALS:   Multidisciplinary Problems     Physical Therapy Goals        Problem: Physical Therapy Goal    Goal Priority Disciplines Outcome Goal Variances Interventions   Physical Therapy Goal     PT, PT/OT Ongoing, Progressing     Description: Goals to be met by: 2020    Patient will increase functional independence with mobility by performin. Supine to sit with Modified [place]  2. Sit to supine with Modified [place]  3. Sit to stand transfer with Modified [place]  4. Gait  x 150 feet with Stand-by Assistance using Rolling Walker.    Recommend:  SNF at time of discharge.                         History:     Past Medical History:   Diagnosis Date    Arthritis     Depression     Diabetes mellitus     Fibromyalgia     Hypertension     Stroke        Past Surgical History:   Procedure Laterality Date    BACK SURGERY      2 x for ruptured disc    KNEE SURGERY      right knee-meniscus       Time Tracking:     PT Received On: 20  PT Start Time: 1037      PT Stop Time: 1052  PT Total Time (min): 15 min     Billable Minutes: Evaluation 15 min      Alf Arriaga, PT  08/09/2020

## 2020-08-09 NOTE — ASSESSMENT & PLAN NOTE
New Dx. Mildly increased HR. Increase metoprolol. Full dose lovenox. RADHA/CV in AM if A-fib persists. Recent echo with good EF. Would place on DOAC at discharge for 1 month after CV. Poor candidate for long term OAC due to dementia and frequent falls

## 2020-08-09 NOTE — ED PROVIDER NOTES
Encounter Date: 8/9/2020       History     Chief Complaint   Patient presents with    Hyperglycemia     daughter called EMS because pt has been having trouble walking, upon EMS arrival , given 100ml fluids with EMS,  pt reports feeling weak and thirsty, stopped taking metformin 2 days ago and symptoms began      71yo female with history of diabetes, CKD, CVA presents to the emergency department for evaluation of bilateral weakness with associated difficulty walking for the past day, that has caused her to fall 4 times.  Patient denies head strike or loss of consciousness.  Patient denies fever, headache, vision changes, nausea, vomiting, diarrhea.  She reports numbness in her bilateral fingertips she reports mild cough that is productive as well as urinary frequency.  She reports that she is compliant with her daily medications.  Collateral history obtained from patient's daughter and chart review reports that patient was recently admitted with hypoglycemia.  Patient's daughter reports that her diabetes medications more discontinued after that admission.  No treatments attempted prior to arrival.  No exacerbating or alleviating factors.  EMS reports patient hyperglycemic in the field.  Patient started on IV hydration and brought to the emergency department for further evaluation.        Review of patient's allergies indicates:  No Known Allergies  Past Medical History:   Diagnosis Date    Arthritis     Depression     Diabetes mellitus     Fibromyalgia     Hypertension     Stroke      Past Surgical History:   Procedure Laterality Date    BACK SURGERY      2 x for ruptured disc    KNEE SURGERY      right knee-meniscus     Family History   Problem Relation Age of Onset    Diabetes Mother     Diabetes Sister      Social History     Tobacco Use    Smoking status: Current Some Day Smoker     Packs/day: 1.00     Years: 46.00     Pack years: 46.00     Types: Cigarettes    Smokeless tobacco: Never Used    Substance Use Topics    Alcohol use: Yes     Comment: socially    Drug use: Yes     Types: Marijuana     Comment: weekly     Review of Systems   Constitutional: Positive for activity change. Negative for fever.   HENT: Negative for sore throat and trouble swallowing.    Eyes: Negative for visual disturbance.   Respiratory: Positive for shortness of breath.    Cardiovascular: Negative for chest pain and leg swelling.   Gastrointestinal: Negative for diarrhea, nausea and vomiting.   Genitourinary: Positive for frequency. Negative for dysuria.   Musculoskeletal: Positive for arthralgias, back pain (Chronic) and gait problem. Negative for joint swelling.   Skin: Negative for rash.   Neurological: Positive for weakness (bilateral legs).   Hematological: Does not bruise/bleed easily.       Physical Exam     Initial Vitals [08/09/20 0106]   BP Pulse Resp Temp SpO2   (!) 150/98 92 20 98.9 °F (37.2 °C) 97 %      MAP       --         Physical Exam    Nursing note and vitals reviewed.  Constitutional: She is not diaphoretic. No distress.   HENT:   Head: Normocephalic and atraumatic.   Mouth/Throat: Oropharynx is clear and moist.   Eyes: EOM are normal. Pupils are equal, round, and reactive to light. No scleral icterus.   Neck: Normal range of motion. Neck supple. No JVD present.   Cardiovascular: Normal rate, regular rhythm and intact distal pulses.   Pulmonary/Chest: Breath sounds normal. No stridor. No respiratory distress.   Abdominal: Soft. Bowel sounds are normal. She exhibits no distension. There is no abdominal tenderness. There is no rebound.   Musculoskeletal: No tenderness or edema.      Comments: Pelvis stable  + pain with range of motion of bilateral hips  No midline vertebral tenderness   Neurological: She is alert. No cranial nerve deficit or sensory deficit. GCS score is 15. GCS eye subscore is 4. GCS verbal subscore is 5. GCS motor subscore is 6.   Skin: Skin is warm and dry.   Psychiatric: She has a normal  mood and affect.         ED Course   Procedures  Labs Reviewed   CBC W/ AUTO DIFFERENTIAL - Abnormal; Notable for the following components:       Result Value    WBC 3.24 (*)     RBC 3.37 (*)     Hemoglobin 9.6 (*)     Hematocrit 30.2 (*)     Mean Corpuscular Hemoglobin Conc 31.8 (*)     Mono # 0.2 (*)     All other components within normal limits   COMPREHENSIVE METABOLIC PANEL - Abnormal; Notable for the following components:    Glucose 320 (*)     Calcium 8.5 (*)     Albumin 3.1 (*)     Alkaline Phosphatase 177 (*)      (*)     ALT 83 (*)     Anion Gap 7 (*)     eGFR if  53 (*)     eGFR if non  46 (*)     All other components within normal limits   URINALYSIS, REFLEX TO URINE CULTURE - Abnormal; Notable for the following components:    Protein, UA 3+ (*)     Glucose, UA 3+ (*)     Urobilinogen, UA 2.0-3.0 (*)     All other components within normal limits    Narrative:     Specimen Source->Urine   B-TYPE NATRIURETIC PEPTIDE - Abnormal; Notable for the following components:     (*)     All other components within normal limits   PHOSPHORUS - Abnormal; Notable for the following components:    Phosphorus 2.5 (*)     All other components within normal limits   POCT GLUCOSE - Abnormal; Notable for the following components:    POCT Glucose 311 (*)     All other components within normal limits   ISTAT PROCEDURE - Abnormal; Notable for the following components:    POC PO2 30 (*)     POC SATURATED O2 58 (*)     All other components within normal limits   POCT GLUCOSE - Abnormal; Notable for the following components:    POCT Glucose 259 (*)     All other components within normal limits   BETA - HYDROXYBUTYRATE, SERUM   TROPONIN I   MAGNESIUM   SARS-COV-2 RNA AMPLIFICATION, QUAL   URINALYSIS MICROSCOPIC    Narrative:     Specimen Source->Urine   POCT GLUCOSE MONITORING CONTINUOUS     EKG Readings: (Independently Interpreted)   Initial Reading: No STEMI. Rhythm: Normal Sinus  Rhythm. Heart Rate: 64. Ectopy: No Ectopy. Conduction: Normal.   Nonspecific T-wave flattening     ECG Results          EKG 12-lead (Final result)  Result time 08/09/20 13:48:37    Final result by Interface, Lab In [facility] (08/09/20 13:48:37)                 Narrative:    Test Reason : R73.9,    Vent. Rate : 064 BPM     Atrial Rate : 064 BPM     P-R Int : 178 ms          QRS Dur : 072 ms      QT Int : 464 ms       P-R-T Axes : 058 089 048 degrees     QTc Int : 478 ms    Normal sinus rhythm  Possible Left atrial enlargement  Possible Anterior infarct (cited on or before 28-JUN-2020)  Abnormal ECG  When compared with ECG of 28-JUN-2020 17:52,  Significant changes have occurred  Confirmed by Joao Oliva MD ([id number]) on 8/9/2020 1:48:24 PM    Referred By: [email or web address]   SELF           Confirmed By:Joao Oliva MD                             EKG 12-LEAD (Final result)  Result time 08/09/20 09:13:03    Final result by Unknown User (08/09/20 09:13:03)                                Imaging Results          CT Lumbar Spine Without Contrast (Final result)  Result time 08/09/20 04:54:08    Final result by Deon Sanchez MD (08/09/20 04:54:08)                 Impression:      Multilevel chronic change of the lumbar spine noted as discussed above with variable spinal canal and foraminal stenosis, correlation for any specific level of symptomatology is needed.    There is no evidence for acute lumbar spine fracture deformity.    Findings thought to represent bilateral perinephric stranding left greater than right, correlation for UTI/pyelonephritis is needed.      Electronically signed by: Deon Sanchez  Date:    08/09/2020  Time:    04:54             Narrative:    EXAMINATION:  CT LUMBAR SPINE WITHOUT CONTRAST    CLINICAL HISTORY:  Back pain or radiculopathy, > 6 wks;    TECHNIQUE:  Low-dose axial, sagittal and coronal reformations are obtained through the lumbar spine.  Contrast was not  administered.    COMPARISON:  None.    FINDINGS:  The osseous structures demonstrate prominent chronic appearing change.  There is chronic endplate change and loss of disc space height most notable at L4-5, with vacuum phenomenon noted.  There is no evidence for high-grade spondylolisthesis, there is no evidence for high-grade or acute compression fracture deformity.  There is appearance thought to represent postoperative change posteriorly at the L4 level for which clinical and historical correlation is needed.  There is also irregular appearance of the inferior aspect of the right L3 facet, this may relate to remote injury, this does not appear acute.  There is no evidence for acute facet fracture or facet dislocation, multilevel chronic facet disease/arthropathy is noted.    There is appearance of a transitional lumbosacral segment this is thought to represent partial sacralization of L5 with pseudoarticulation with the sacrum.    The T10-11 level demonstrates right-sided posterior facet arthropathy with posterolateral encroachment, along the posterolateral aspect of the spinal canal.  There is also associated right foraminal narrowing.  There is no evidence for high-grade spinal canal stenosis.    The T11-12 level demonstrates no evidence for high-grade spinal canal stenosis, there is posterior facet arthropathy more prominent on the right with some mild posterolateral encroachment, there is foraminal narrowing bilaterally.    The T12-L1 level demonstrates mild degenerative disc disease, mild anterior flattening of the dural sac.  There is right greater than left posterior facet arthropathy.  There is right greater than left foraminal narrowing.  There is no high-grade spinal canal stenosis.    The L1-2 level demonstrates mild degenerative disc bulge minimal anterior flattening of the dural sac there is posterior facet arthropathy there is no evidence for high-grade spinal canal stenosis.  Mild foraminal  narrowing noted.    The L2-3 level demonstrates diffuse degenerative disc bulge with mild asymmetry to the left.  There is bilateral posterior facet arthropathy and ligamentous thickening, and on the left there is prominent osseous facet osteophyte extending into the posterolateral aspect of the spinal canal producing left lateral spinal canal stenosis, correlation for exiting or descending nerve root symptomatology is needed.  The aforementioned findings appear to produce high-grade spinal canal stenosis at L2-3.  There is also bilateral foraminal narrowing/stenosis.    The L3-4 level demonstrates prominent diffuse degenerative disc bulge, there is posterior facet arthropathy and ligamentous thickening, there is high-grade spinal canal stenosis.  There is bilateral foraminal stenosis.    The L4-5 level demonstrates the aforementioned loss of disc space height, there is prominent marginal osteophyte formation, there is anterior impression upon the dural sac more prominent along the right paramidline lateral canal location, there is suspected postoperative change as well.  Significant disc component is not identified on this exam, however there is moderate spinal canal stenosis due to the aforementioned osteophyte.  Marginal osteophyte extends to the level of the neural foramen bilaterally and there is bilateral facet arthropathy these findings produce high-grade foraminal stenosis bilaterally.    The L5-S1 level demonstrates a somewhat relative small spinal canal on a developmental basis.  There is mild disc disease noted, however without high-grade spinal canal stenosis.  There is mild foraminal narrowing.  This appears more prominent on the right.    On close evaluation of available imaging there is no evidence for acute lumbar spine fracture deformity.    Note is made of bilateral perinephric stranding, more prominent on the left, correlation for UTI/pyelonephritis is needed.                               CT  Pelvis Without Contrast (Final result)  Result time 08/09/20 04:59:36    Final result by Deon Sanchez MD (08/09/20 04:59:36)                 Impression:      Prominent chronic changes are noted without evidence for acute fracture or dislocation, close clinical and historical correlation is otherwise needed to determine need for additional follow-up.      Electronically signed by: Deon Sanchez  Date:    08/09/2020  Time:    04:59             Narrative:    EXAMINATION:  CT PELVIS WITHOUT CONTRAST    CLINICAL HISTORY:  Pelvic trauma, nondiagnostic xray;    TECHNIQUE:  CT examination of the pelvis was performed, intravenous and oral contrast was not utilized, axial imaging obtained from above the level of the iliac crest through the ischium.  Sagittal and coronal reconstruction imaging is submitted.    COMPARISON:  None    FINDINGS:  The osseous structures demonstrate prominent chronic change, there is diminished mineralization and degenerative change.  Chronic changes of the lower lumbar spine are noted, and discussed in greater detail on the lumbar spine CT report of the same date.  There are chronic changes at the sacroiliac joints, symphysis pubis and hip joints bilaterally, with prominent chronic change at the hip joints, left greater than right, including articular and subarticular sclerotic and degenerative cystic change, these findings are seen as an interval progression when compared to a prior CT examination of September 2017.  On close evaluation of available imaging there is no evidence for acute fracture deformity, and there is no evidence for hip dislocation.                               X-Ray Hip 2 View Right (Final result)  Result time 08/09/20 03:44:03    Final result by Nitin Mitchell MD (08/09/20 03:44:03)                 Impression:      No obvious evidence of an acute injury involving the pelvis.      Electronically signed by: Nitin Mitchell  Date:    08/09/2020  Time:    03:44              Narrative:    EXAMINATION:  XR HIP 2 VIEW RIGHT    CLINICAL HISTORY:  Pain in right hip    TECHNIQUE:  AP view of the pelvis and frog leg lateral view of the right hip were performed.    COMPARISON:  April 23, 2018    FINDINGS:  Single frontal view of the pelvis indicates no apparent evidence of an acute fracture injury involving the proximal right or left femur.  Osteopenia is appreciated.  The pelvis is intact.  Sacroiliac joints are symmetric in appearance.  No obvious disruption of the pubic symphysis.  The inferior and superior pubic rami bilaterally are within normal limits.                               CT Head Without Contrast (Final result)  Result time 08/09/20 02:49:53    Final result by Nitin Mitchell MD (08/09/20 02:49:53)                 Impression:      No significant changes compared to the previous exam obtained emanation obtained in June of this year.      Electronically signed by: Nitin Mitchell  Date:    08/09/2020  Time:    02:49             Narrative:    EXAMINATION:  CT HEAD WITHOUT CONTRAST    CLINICAL HISTORY:  Ataxia, post head trauma;    TECHNIQUE:  Low dose axial images were obtained through the head.  Coronal and sagittal reformations were also performed. Contrast was not administered.    COMPARISON:  June 28, 2020    FINDINGS:  No indication of a scalp hemorrhage or irregularity of subcutaneous tissues.  The skull is intact.  There is trace mucosal thickening within the ethmoid air cells.  The mastoid air cells are clear.  The globes, optic nerves and extraocular muscles are within normal limits.  No indication of a mass within the intra or extra conal fat.    There is an elongated area of increased attenuation in the posterior thalamus on the left-hand side.  This was present previously and remains unchanged.   It has been identified as a calcification.  There are old areas of ischemia involving the posterior frontal lobe on the left as well as the left parietal lobe.  These  appear to be unchanged from the previous examination.  There are additional ischemic changes in the occipital lobe, also on the left.  A rounded pontine infarction is appreciated.  This was present previously and remains unchanged                               X-Ray Chest AP Portable (Final result)  Result time 08/09/20 01:51:52    Final result by Tabatha Pascual MD (08/09/20 01:51:52)                 Impression:      Prominent interstitial lung markings.  Findings may suggest early interstitial edema or early interstitial infiltrates.      Electronically signed by: Tabatha Pascual  Date:    08/09/2020  Time:    01:51             Narrative:    EXAMINATION:  AP PORTABLE CHEST    CLINICAL HISTORY:  hyperglycemia;    TECHNIQUE:  AP portable chest radiograph was submitted.    COMPARISON:  12/26/2019    FINDINGS:  AP portable chest radiograph demonstrates a cardiac silhouette within normal limits.  Prominent interstitial lung markings are present.  There is no focal consolidation, pneumothorax, or pleural effusion.  The previously seen prominence at the AP window is less conspicuous.                                 Medical Decision Making:   History:   Old Medical Records: I decided to obtain old medical records.  Differential Diagnosis:   Includes but not limited to:  DKA electrolyte abnormality, renal failure, dehydration, CVA, ACS  Independently Interpreted Test(s):   I have ordered and independently interpreted EKG Reading(s) - see prior notes  Clinical Tests:   Lab Tests: Ordered and Reviewed  Radiological Study: Ordered and Reviewed  Medical Tests: Reviewed and Ordered  ED Management:  Patient is afebrile and in no acute distress at time history and physical.  She has no deformity.  She has a GCS of 15.  She is hyperglycemic.  Vital signs within acceptable ranges.  EKG without definite acute ischemic changes.  CT of the brain without evidence of remote stroke.  No evidence of acute stroke.  Labs with  hyperglycemia but without acidosis.  Beta hydroxybutyrate is negative.  Renal function is comparable to prior.  Patient given IV hydration with some improvement in glucose.  In reassessment patient complains of severe right hip pain.  X-rays without right hip fracture.  Due to continued pain and inability to bear weight or ambulate patient sent for CT scan which did not demonstrate acute lumbar or pelvic fracture but did demonstrate significant degenerative changes.  Patient continues to be unable to ambulate in the emergency department.  Unclear if symptoms are related to lumbar radiculopathy or to intracranial process.  She is to be placed in observation for neuro checks, MRI, PT, OT evaluation and further evaluation and  Management.                                 Clinical Impression:       ICD-10-CM ICD-9-CM   1. Frequent falls  R29.6 V15.88   2. Hyperglycemia  R73.9 790.29   3. Right hip pain  M25.551 719.45   4. Gait disturbance  R26.9 781.2   5. Chest pain  R07.9 786.50       Disposition:   Disposition: Placed in Observation  Condition: Fair     ED Disposition Condition    Observation                           Priyanka Bazan MD  08/10/20 0602

## 2020-08-09 NOTE — CONSULTS
"[facility]  Neurology  Consult Note    Patient Name: Lucina Villalpando  MRN: [id number]  Admission Date: 8/9/2020  Hospital Length of Stay: 0 days  Code Status: Full Code   Attending Provider: Nichelle Baumann MD   Consulting Provider: Cheng Jose MD  Primary Care Physician: Alexia Cervantes MD  Principal Problem:Weakness    Consults  Subjective:     Chief Complaint: "I feel weak"     HPI: 69 y/o female with medical Hx as listed below comes to ED for generalized weakness for around one week. Pt tells me her body feels heavy and has not been able to walk. Ms. Villalpando reports swelling of feet. She has god appetite. No visual or speech disturbances, vertigo, unilateral weakness or numbness.    Past Medical History:   Diagnosis Date    Arthritis     Depression     Diabetes mellitus     Fibromyalgia     Hypertension     Stroke        Past Surgical History:   Procedure Laterality Date    BACK SURGERY      2 x for ruptured disc    KNEE SURGERY      right knee-meniscus       Review of patient's allergies indicates:  No Known Allergies    Current Neurological Medications:    No current facility-administered medications on file prior to encounter.      Current Outpatient Medications on File Prior to Encounter   Medication Sig    aspirin (ECOTRIN) 325 MG EC tablet Take 1 tablet (325 mg total) by mouth once daily.    hydroxychloroquine (PLAQUENIL) 200 mg tablet Take 1 tablet (200 mg total) by mouth once daily. Home medication    tramadol (ULTRAM) 50 mg tablet Take 50 mg by mouth every 6 (six) hours as needed for Pain.    atorvastatin (LIPITOR) 40 MG tablet Take 1 tablet (40 mg total) by mouth every evening.    clopidogrel (PLAVIX) 75 mg tablet Take 1 tablet (75 mg total) by mouth once daily. for 21 days    cyanocobalamin (VITAMIN B-12) 1000 MCG tablet Take 100 mcg by mouth once daily.    ondansetron (ZOFRAN-ODT) 8 MG TbDL Take 1 tablet (8 mg total) by mouth every 8 (eight) hours as needed " (Nausea).      Family History     Problem Relation (Age of Onset)    Diabetes Mother, Sister        Tobacco Use    Smoking status: Current Some Day Smoker     Packs/day: 1.00     Years: 46.00     Pack years: 46.00     Types: Cigarettes    Smokeless tobacco: Never Used   Substance and Sexual Activity    Alcohol use: Yes     Comment: socially    Drug use: Yes     Types: Marijuana     Comment: weekly    Sexual activity: Not on file     Review of Systems   Constitutional: Positive for fatigue.  HENT: Negative for trouble swallowing.    Eyes: Negative for photophobia.   Respiratory: Negative for shortness of breath.    Cardiovascular: Negative for chest pain.   Gastrointestinal: Negative for abdominal pain.   Genitourinary: Negative for dysuria.   Musculoskeletal: Negative for back pain.   Neurological: Negative for headaches.     Objective:     Vital Signs (Most Recent):  Temp: 98.2 °F (36.8 °C) (08/09/20 1125)  Pulse: 98 (08/09/20 1125)  Resp: 16 (08/09/20 1125)  BP: (!) 176/96 (08/09/20 1125)  SpO2: 100 % (08/09/20 1217) Vital Signs (24h Range):  Temp:  [98 °F (36.7 °C)-98.9 °F (37.2 °C)] 98.2 °F (36.8 °C)  Pulse:  [] 98  Resp:  [16-20] 16  SpO2:  [93 %-100 %] 100 %  BP: (150-186)/() 176/96     Weight: 60 kg (132 lb 4.4 oz)  Body mass index is 20.11 kg/m².    Physical Exam  Constitutional:       General: She is not in acute distress.     Appearance: She is not ill-appearing.   HENT:      Head: Normocephalic and atraumatic.      Right Ear: External ear normal.      Left Ear: External ear normal.   Eyes:      General:         Right eye: No discharge.         Left eye: No discharge.   Cardiovascular:      Rate and Rhythm: Normal rate and regular rhythm.   Abdominal:      Palpations: Abdomen is soft.   Musculoskeletal:         General: No tenderness.   Neurological:      Mental Status: She is oriented to person, place, and time.      Coordination: Finger-Nose-Finger Test normal.   Psychiatric:          Speech: Speech normal.            NEUROLOGICAL EXAMINATION:      MENTAL STATUS   Oriented to person, place, and time.   Speech: speech is normal   Level of consciousness: alert     CRANIAL NERVES      CN III, IV, VI   Right pupil: Size: 3 mm. Shape: regular.   Left pupil: Size: 3 mm. Shape: regular.   Nystagmus: none   Ophthalmoparesis: none  Conjugate gaze: present     CN VII   Right facial weakness: none  Left facial weakness: none     CN XII   Tongue deviation: none     MOTOR EXAM        Left 4/5  Right: 4/5      SENSORY EXAM   Right arm light touch: normal  Left arm light touch: normal  Right leg light touch: normal  Left leg light touch: normal     GAIT AND COORDINATION      Coordination   Finger to nose coordination: normal     Tremor   Resting tremor: absent       Significant Labs:   CBC:   Recent Labs   Lab 08/09/20  0205   WBC 3.24*   HGB 9.6*   HCT 30.2*        CMP:   Recent Labs   Lab 08/09/20  0205   *      K 3.7      CO2 24   BUN 21   CREATININE 1.2   CALCIUM 8.5*   MG 1.7   PROT 8.1   ALBUMIN 3.1*   BILITOT 0.3   ALKPHOS 177*   *   ALT 83*   ANIONGAP 7*   EGFRNONAA 46*       Significant Imaging: I have reviewed all pertinent imaging results/findings within the past 24 hours.   Head CT  FINDINGS:  No indication of a scalp hemorrhage or irregularity of subcutaneous tissues.  The skull is intact.  There is trace mucosal thickening within the ethmoid air cells.  The mastoid air cells are clear.  The globes, optic nerves and extraocular muscles are within normal limits.  No indication of a mass within the intra or extra conal fat.     There is an elongated area of increased attenuation in the posterior thalamus on the left-hand side.  This was present previously and remains unchanged.   It has been identified as a calcification.  There are old areas of ischemia involving the posterior frontal lobe on the left as well as the left parietal lobe.  These appear to be unchanged  from the previous examination.  There are additional ischemic changes in the occipital lobe, also on the left.  A rounded pontine infarction is appreciated.  This was present previously and remains unchanged     Impression:     No significant changes compared to the previous exam obtained emanation obtained in June of this year.        Electronically signed by: Nitin Mitchell  Date:                                            08/09/2020  Time:                                           02:49      Assessment and Plan:     71 y/o female consulted or weakness    1.Weakness: generalized weakness with no focal findings on exam. No ptosis or respiratory compromise.   Head C shows no acute abnormalities. Normal TSH and H/H shows no profound anemia that may explain these symptoms. No Hx of HF.   Pt has dysphagia. See SLP note.   -MG panel?       Active Diagnoses:    Diagnosis Date Noted POA    PRINCIPAL PROBLEM:  Weakness [R53.1] 08/09/2020 Unknown    Lupus [M32.9] 08/09/2020 Unknown    Hyperglycemia [R73.9] 08/09/2020 Unknown    Elevated brain natriuretic peptide (BNP) level [R79.89] 08/09/2020 Unknown    Dysphagia [R13.10] 08/09/2020 Unknown    Elevated transaminase level [R74.0] 08/09/2020 Unknown    Persistent atrial fibrillation [I48.19] 08/09/2020 Yes    Frequent falls [R29.6] 08/09/2020 Not Applicable    CKD (chronic kidney disease) [N18.9] 06/28/2020 Yes    Type 2 diabetes mellitus with neurologic complication [E11.49] 09/16/2015 Yes    H/O: CVA (cerebrovascular accident) [Z86.73] 09/16/2015 Not Applicable    Essential hypertension [I10] 09/16/2015 Yes      Problems Resolved During this Admission:       VTE Risk Mitigation (From admission, onward)         Ordered     enoxaparin injection 60 mg  Every 12 hours (non-standard times)      08/09/20 0835     Place YULI hose  Until discontinued      08/09/20 0642     Place sequential compression device  Until discontinued      08/09/20 0642     IP VTE LOW RISK  PATIENT  Once      08/09/20 0642                Thank you for your consult. I will follow-up with patient. Please contact us if you have any additional questions.    Cheng Jose MD  Neurology  [facility]

## 2020-08-09 NOTE — ASSESSMENT & PLAN NOTE
Lab Results   Component Value Date    HGBA1C 5.9 (H) 06/29/2020      today.   On home glipizide prior but was discontinued due to hypoglycemia. Not taking any antihyperglycemic agents at home since discharge 6/30/2020.  See above

## 2020-08-09 NOTE — PLAN OF CARE
Problem: Physical Therapy Goal  Goal: Physical Therapy Goal  Description: Goals to be met by: 2020    Patient will increase functional independence with mobility by performin. Supine to sit with Modified Nelson  2. Sit to supine with Modified Nelson  3. Sit to stand transfer with Modified Nelson  4. Gait  x 150 feet with Stand-by Assistance using Rolling Walker.    Recommend:  SNF at time of discharge.        Outcome: Ongoing, Progressing     Alf Arriaga, PT  2020

## 2020-08-09 NOTE — PROGRESS NOTES
MEWS Monitoring: MEWS 3 on arrival to unit, decreased to MEWS of 2, Chart check completed, abnormal VS noted, active rounds performed, pt awake, no signs of distress, discussed plan of care with CONSTANCE Witt at bedside, medications ordered for vital signs and glucose, educated pt on role of proactive rounds, no concerns verbalized at this time, instructed to call [phone] for further concerns or assistance.

## 2020-08-10 VITALS
WEIGHT: 132.25 LBS | SYSTOLIC BLOOD PRESSURE: 115 MMHG | TEMPERATURE: 98 F | BODY MASS INDEX: 20.04 KG/M2 | HEIGHT: 68 IN | HEART RATE: 69 BPM | DIASTOLIC BLOOD PRESSURE: 62 MMHG | OXYGEN SATURATION: 94 % | RESPIRATION RATE: 16 BRPM

## 2020-08-10 LAB
ALBUMIN SERPL BCP-MCNC: 3 G/DL (ref 3.5–5.2)
ALP SERPL-CCNC: 150 U/L (ref 55–135)
ALT SERPL W/O P-5'-P-CCNC: 70 U/L (ref 10–44)
ANION GAP SERPL CALC-SCNC: 6 MMOL/L (ref 8–16)
AST SERPL-CCNC: 73 U/L (ref 10–40)
BASOPHILS # BLD AUTO: 0.03 K/UL (ref 0–0.2)
BASOPHILS NFR BLD: 0.9 % (ref 0–1.9)
BILIRUB SERPL-MCNC: 0.4 MG/DL (ref 0.1–1)
BUN SERPL-MCNC: 19 MG/DL (ref 8–23)
CALCIUM SERPL-MCNC: 9.1 MG/DL (ref 8.7–10.5)
CHLORIDE SERPL-SCNC: 106 MMOL/L (ref 95–110)
CO2 SERPL-SCNC: 26 MMOL/L (ref 23–29)
CREAT SERPL-MCNC: 1 MG/DL (ref 0.5–1.4)
DIFFERENTIAL METHOD: ABNORMAL
EOSINOPHIL # BLD AUTO: 0.1 K/UL (ref 0–0.5)
EOSINOPHIL NFR BLD: 2.3 % (ref 0–8)
ERYTHROCYTE [DISTWIDTH] IN BLOOD BY AUTOMATED COUNT: 14.1 % (ref 11.5–14.5)
EST. GFR  (AFRICAN AMERICAN): >60 ML/MIN/1.73 M^2
EST. GFR  (NON AFRICAN AMERICAN): 58 ML/MIN/1.73 M^2
ESTIMATED AVG GLUCOSE: 163 MG/DL (ref 68–131)
GLUCOSE SERPL-MCNC: 70 MG/DL (ref 70–110)
HAV IGM SERPL QL IA: NEGATIVE
HBA1C MFR BLD HPLC: 7.3 % (ref 4–5.6)
HBV CORE IGM SERPL QL IA: NEGATIVE
HBV SURFACE AG SERPL QL IA: NEGATIVE
HCT VFR BLD AUTO: 31.7 % (ref 37–48.5)
HCV AB SERPL QL IA: POSITIVE
HGB BLD-MCNC: 10.3 G/DL (ref 12–16)
IMM GRANULOCYTES # BLD AUTO: 0.01 K/UL (ref 0–0.04)
IMM GRANULOCYTES NFR BLD AUTO: 0.3 % (ref 0–0.5)
LYMPHOCYTES # BLD AUTO: 1.7 K/UL (ref 1–4.8)
LYMPHOCYTES NFR BLD: 47.6 % (ref 18–48)
MCH RBC QN AUTO: 28.5 PG (ref 27–31)
MCHC RBC AUTO-ENTMCNC: 32.5 G/DL (ref 32–36)
MCV RBC AUTO: 88 FL (ref 82–98)
MONOCYTES # BLD AUTO: 0.3 K/UL (ref 0.3–1)
MONOCYTES NFR BLD: 9.7 % (ref 4–15)
NEUTROPHILS # BLD AUTO: 1.4 K/UL (ref 1.8–7.7)
NEUTROPHILS NFR BLD: 39.2 % (ref 38–73)
NRBC BLD-RTO: 0 /100 WBC
PLATELET # BLD AUTO: 240 K/UL (ref 150–350)
PMV BLD AUTO: 11.9 FL (ref 9.2–12.9)
POCT GLUCOSE: 106 MG/DL (ref 70–110)
POCT GLUCOSE: 63 MG/DL (ref 70–110)
POCT GLUCOSE: 74 MG/DL (ref 70–110)
POTASSIUM SERPL-SCNC: 3.6 MMOL/L (ref 3.5–5.1)
PROT SERPL-MCNC: 8.4 G/DL (ref 6–8.4)
RBC # BLD AUTO: 3.62 M/UL (ref 4–5.4)
RPR SER QL: NORMAL
SODIUM SERPL-SCNC: 138 MMOL/L (ref 136–145)
WBC # BLD AUTO: 3.49 K/UL (ref 3.9–12.7)

## 2020-08-10 PROCEDURE — 99214 PR OFFICE/OUTPT VISIT, EST, LEVL IV, 30-39 MIN: ICD-10-PCS | Mod: ,,, | Performed by: PSYCHIATRY & NEUROLOGY

## 2020-08-10 PROCEDURE — A9698 NON-RAD CONTRAST MATERIALNOC: HCPCS | Performed by: HOSPITALIST

## 2020-08-10 PROCEDURE — 86580 TB INTRADERMAL TEST: CPT | Performed by: NURSE PRACTITIONER

## 2020-08-10 PROCEDURE — 36415 COLL VENOUS BLD VENIPUNCTURE: CPT

## 2020-08-10 PROCEDURE — 99214 PR OFFICE/OUTPT VISIT, EST, LEVL IV, 30-39 MIN: ICD-10-PCS | Mod: 95,,, | Performed by: INTERNAL MEDICINE

## 2020-08-10 PROCEDURE — 63600175 PHARM REV CODE 636 W HCPCS: Performed by: NURSE PRACTITIONER

## 2020-08-10 PROCEDURE — 80053 COMPREHEN METABOLIC PANEL: CPT

## 2020-08-10 PROCEDURE — 25000003 PHARM REV CODE 250: Performed by: NURSE PRACTITIONER

## 2020-08-10 PROCEDURE — 83036 HEMOGLOBIN GLYCOSYLATED A1C: CPT

## 2020-08-10 PROCEDURE — 25000003 PHARM REV CODE 250: Performed by: PHYSICIAN ASSISTANT

## 2020-08-10 PROCEDURE — G0378 HOSPITAL OBSERVATION PER HR: HCPCS

## 2020-08-10 PROCEDURE — 97535 SELF CARE MNGMENT TRAINING: CPT

## 2020-08-10 PROCEDURE — 96375 TX/PRO/DX INJ NEW DRUG ADDON: CPT | Performed by: EMERGENCY MEDICINE

## 2020-08-10 PROCEDURE — 96372 THER/PROPH/DIAG INJ SC/IM: CPT | Performed by: EMERGENCY MEDICINE

## 2020-08-10 PROCEDURE — 30200315 PPD INTRADERMAL TEST REV CODE 302: Performed by: NURSE PRACTITIONER

## 2020-08-10 PROCEDURE — 92611 MOTION FLUOROSCOPY/SWALLOW: CPT

## 2020-08-10 PROCEDURE — 25500020 PHARM REV CODE 255: Performed by: HOSPITALIST

## 2020-08-10 PROCEDURE — 85025 COMPLETE CBC W/AUTO DIFF WBC: CPT

## 2020-08-10 PROCEDURE — 99214 OFFICE O/P EST MOD 30 MIN: CPT | Mod: ,,, | Performed by: PSYCHIATRY & NEUROLOGY

## 2020-08-10 PROCEDURE — 87522 HEPATITIS C REVRS TRNSCRPJ: CPT

## 2020-08-10 PROCEDURE — 99214 OFFICE O/P EST MOD 30 MIN: CPT | Mod: 95,,, | Performed by: INTERNAL MEDICINE

## 2020-08-10 PROCEDURE — 25000003 PHARM REV CODE 250: Performed by: INTERNAL MEDICINE

## 2020-08-10 RX ORDER — AMLODIPINE BESYLATE 5 MG/1
10 TABLET ORAL DAILY
Status: DISCONTINUED | OUTPATIENT
Start: 2020-08-11 | End: 2020-08-10 | Stop reason: HOSPADM

## 2020-08-10 RX ORDER — CLONIDINE HYDROCHLORIDE 0.1 MG/1
0.1 TABLET ORAL ONCE
Status: COMPLETED | OUTPATIENT
Start: 2020-08-10 | End: 2020-08-10

## 2020-08-10 RX ORDER — AMLODIPINE BESYLATE 5 MG/1
5 TABLET ORAL DAILY
Status: DISCONTINUED | OUTPATIENT
Start: 2020-08-10 | End: 2020-08-10

## 2020-08-10 RX ORDER — METOPROLOL TARTRATE 50 MG/1
50 TABLET ORAL 2 TIMES DAILY
Qty: 60 TABLET | Refills: 0
Start: 2020-08-10 | End: 2021-08-10

## 2020-08-10 RX ORDER — AMLODIPINE BESYLATE 5 MG/1
5 TABLET ORAL DAILY
Qty: 30 TABLET | Refills: 0 | Status: SHIPPED | OUTPATIENT
Start: 2020-08-11 | End: 2021-08-11

## 2020-08-10 RX ORDER — TRAMADOL HYDROCHLORIDE 50 MG/1
50 TABLET ORAL EVERY 6 HOURS PRN
Qty: 20 TABLET | Refills: 0 | Status: SHIPPED | OUTPATIENT
Start: 2020-08-10

## 2020-08-10 RX ORDER — AMLODIPINE BESYLATE 5 MG/1
5 TABLET ORAL ONCE
Status: COMPLETED | OUTPATIENT
Start: 2020-08-10 | End: 2020-08-10

## 2020-08-10 RX ADMIN — CLONIDINE HYDROCHLORIDE 0.1 MG: 0.1 TABLET ORAL at 11:08

## 2020-08-10 RX ADMIN — DOCUSATE SODIUM 50 MG AND SENNOSIDES 8.6 MG 1 TABLET: 8.6; 5 TABLET, FILM COATED ORAL at 09:08

## 2020-08-10 RX ADMIN — AMLODIPINE BESYLATE 5 MG: 5 TABLET ORAL at 11:08

## 2020-08-10 RX ADMIN — DEXTROSE MONOHYDRATE 12.5 G: 25 INJECTION, SOLUTION INTRAVENOUS at 11:08

## 2020-08-10 RX ADMIN — BARIUM SULFATE 40 ML: 0.81 POWDER, FOR SUSPENSION ORAL at 01:08

## 2020-08-10 RX ADMIN — AMLODIPINE BESYLATE 5 MG: 5 TABLET ORAL at 09:08

## 2020-08-10 RX ADMIN — ENOXAPARIN SODIUM 60 MG: 60 INJECTION SUBCUTANEOUS at 09:08

## 2020-08-10 RX ADMIN — HYDROXYCHLOROQUINE SULFATE 200 MG: 200 TABLET, FILM COATED ORAL at 09:08

## 2020-08-10 RX ADMIN — METOPROLOL TARTRATE 50 MG: 50 TABLET, FILM COATED ORAL at 09:08

## 2020-08-10 RX ADMIN — ASPIRIN 325 MG: 325 TABLET, DELAYED RELEASE ORAL at 09:08

## 2020-08-10 RX ADMIN — ACETAMINOPHEN 500 MG: 500 TABLET ORAL at 09:08

## 2020-08-10 RX ADMIN — TUBERCULIN PURIFIED PROTEIN DERIVATIVE 5 UNITS: 5 INJECTION, SOLUTION INTRADERMAL at 02:08

## 2020-08-10 NOTE — PROGRESS NOTES
[facility]  Neurology  Progress Note    Patient Name: Lucina Villalpando  MRN: [id number]  Admission Date: 8/9/2020  Hospital Length of Stay: 0 days  Code Status: Full Code   Attending Provider: Nichelle Baumann MD  Primary Care Physician: Alexia Cervantes MD   Principal Problem:Weakness    Subjective:     Interval History: 71 y/o female with medical Hx as listed below comes to ED for generalized weakness for around one week. Pt tells me her body feels heavy and has not been able to walk. Ms. Villalpando reports swelling of feet. She has god appetite. No visual or speech disturbances, vertigo, unilateral weakness or numbness.    -8/10/20: No acute issues overnight.    Current Neurological Medications:     Current Facility-Administered Medications   Medication Dose Route Frequency Provider Last Rate Last Dose    0.9%  NaCl infusion   Intravenous Once Joao Oliva MD        acetaminophen tablet 500 mg  500 mg Oral Q6H PRN Mark Iverson PA-C        albuterol-ipratropium 2.5 mg-0.5 mg/3 mL nebulizer solution 3 mL  3 mL Nebulization Q4H PRN Mien P. Soriano, NP        amLODIPine tablet 5 mg  5 mg Oral Daily Francia Garrett Christie, NP        aspirin EC tablet 325 mg  325 mg Oral Daily Mien P. Soriano, NP   325 mg at 08/09/20 0856    atorvastatin tablet 40 mg  40 mg Oral QHS Mien P. Soriano, NP   40 mg at 08/09/20 2153    dextrose 50% injection 12.5 g  12.5 g Intravenous PRN Mien P. Soriano, NP        dextrose 50% injection 25 g  25 g Intravenous PRN Mien P. Soriano, NP        enoxaparin injection 60 mg  1 mg/kg Subcutaneous Q12H Francia Garrett Soriano, NP   60 mg at 08/09/20 2153    glucagon (human recombinant) injection 1 mg  1 mg Intramuscular PRN Mien P. Soriano, NP        glucose chewable tablet 16 g  16 g Oral PRN Mien P. Soriano, NP        glucose chewable tablet 24 g  24 g Oral PRN Mien P. Soriano, NP        hydrOXYchloroQUINE tablet 200 mg  200 mg Oral Daily Mien P. Soriano, NP   200 mg at 08/09/20 0856     insulin aspart U-100 pen 0-5 Units  0-5 Units Subcutaneous QID (AC + HS) PRN Diane Soriano NP   4 Units at 08/09/20 1750    insulin detemir U-100 pen 10 Units  10 Units Subcutaneous QHS Francia Tucker KANDACE Soriano        metoprolol tartrate (LOPRESSOR) tablet 50 mg  50 mg Oral BID Joao Oliva MD   50 mg at 08/09/20 2205    ondansetron injection 4 mg  4 mg Intravenous Q8H PRN Diane Soriano NP        senna-docusate 8.6-50 mg per tablet 1 tablet  1 tablet Oral BID Diane Soriano NP   1 tablet at 08/09/20 2153    sodium chloride 0.9% flush 10 mL  10 mL Intravenous PRN Diane Soriano NP        sodium chloride 0.9% flush 10 mL  10 mL Intravenous PRN Joao Oliva MD           Review of Systems   Constitutional: Positive for fatigue.  HENT: Negative for trouble swallowing.    Eyes: Negative for photophobia.   Respiratory: Negative for shortness of breath.    Cardiovascular: Negative for chest pain.   Gastrointestinal: Negative for abdominal pain.   Genitourinary: Negative for dysuria.   Musculoskeletal: Negative for back pain.   Neurological: Negative for headaches.     Objective:     Vital Signs (Most Recent):  Temp: 99.1 °F (37.3 °C) (08/10/20 0747)  Pulse: 65 (08/10/20 0747)  Resp: 16 (08/10/20 0747)  BP: (!) 187/108 (08/10/20 0747)  SpO2: 99 % (08/10/20 0747) Vital Signs (24h Range):  Temp:  [98 °F (36.7 °C)-99.1 °F (37.3 °C)] 99.1 °F (37.3 °C)  Pulse:  [59-98] 65  Resp:  [16-20] 16  SpO2:  [98 %-100 %] 99 %  BP: (151-187)/() 187/108     Weight: 60 kg (132 lb 4.4 oz)  Body mass index is 20.11 kg/m².    Physical Exam  Constitutional:       General: She is not in acute distress.     Appearance: She is not ill-appearing.   HENT:      Head: Normocephalic and atraumatic.      Right Ear: External ear normal.      Left Ear: External ear normal.   Eyes:      General:         Right eye: No discharge.         Left eye: No discharge.   Cardiovascular:      Rate and Rhythm: Normal rate and regular rhythm.    Abdominal:      Palpations: Abdomen is soft.   Musculoskeletal:         General: No tenderness.   Neurological:      Mental Status: She is oriented to person, place, and time.      Coordination: Finger-Nose-Finger Test normal.   Psychiatric:         Speech: Speech normal.            NEUROLOGICAL EXAMINATION:      MENTAL STATUS   Oriented to person, place, and time.   Speech: speech is normal   Level of consciousness: alert     CRANIAL NERVES      CN III, IV, VI   Right pupil: Size: 3 mm. Shape: regular.   Left pupil: Size: 3 mm. Shape: regular.   Nystagmus: none   Ophthalmoparesis: none  Conjugate gaze: present     CN VII   Right facial weakness: none  Left facial weakness: none     CN XII   Tongue deviation: none     MOTOR EXAM        Left 4/5  Right: 4/5      SENSORY EXAM   Right arm light touch: normal  Left arm light touch: normal  Right leg light touch: normal  Left leg light touch: normal     GAIT AND COORDINATION      Coordination   Finger to nose coordination: normal     Tremor   Resting tremor: absent            Significant Labs:   CBC:   Recent Labs   Lab 08/09/20  2152 08/10/20  0530   WBC 3.31* 3.49*   HGB 10.8* 10.3*   HCT 34.2* 31.7*    240     CMP:   Recent Labs   Lab 08/09/20  2152 08/10/20  0530   * 70    138   K 4.0 3.6    106   CO2 22* 26   BUN 20 19   CREATININE 1.0 1.0   CALCIUM 9.4 9.1   PROT  --  8.4   ALBUMIN  --  3.0*   BILITOT  --  0.4   ALKPHOS  --  150*   AST  --  73*   ALT  --  70*   ANIONGAP 9 6*   EGFRNONAA 58* 58*       Significant Imaging: I have reviewed all pertinent imaging results/findings within the past 24 hours.    Assessment and Plan:     71 y/o female consulted or weakness     1.Weakness: generalized weakness with no focal findings on exam. No ptosis or respiratory compromise.           Head C shows no acute abnormalities. Normal TSH and H/H shows no profound anemia that may explain these symptoms. No Hx of HF.   She was found to be in A-fib. Now  back to NSR. Poor OAC candidate due to multiple falls. SHe will be kept on ASA. Also elevated BNP and liver enzymes           Pt has dysphagia although on MBSS no aspiration of penetration. See SLP note.           -Pt ok to transfer to SNF.    Active Diagnoses:    Diagnosis Date Noted POA    PRINCIPAL PROBLEM:  Weakness [R53.1] 08/09/2020 Unknown    Lupus [M32.9] 08/09/2020 Unknown    Hyperglycemia [R73.9] 08/09/2020 Unknown    Elevated brain natriuretic peptide (BNP) level [R79.89] 08/09/2020 Unknown    Dysphagia [R13.10] 08/09/2020 Unknown    Elevated transaminase level [R74.0] 08/09/2020 Unknown    Persistent atrial fibrillation [I48.19] 08/09/2020 Yes    Frequent falls [R29.6] 08/09/2020 Not Applicable    CKD (chronic kidney disease) [N18.9] 06/28/2020 Yes    Type 2 diabetes mellitus with neurologic complication [E11.49] 09/16/2015 Yes    H/O: CVA (cerebrovascular accident) [Z86.73] 09/16/2015 Not Applicable    Essential hypertension [I10] 09/16/2015 Yes      Problems Resolved During this Admission:       VTE Risk Mitigation (From admission, onward)         Ordered     enoxaparin injection 60 mg  Every 12 hours (non-standard times)      08/09/20 0835     Place YULI hose  Until discontinued      08/09/20 0642     Place sequential compression device  Until discontinued      08/09/20 0642     IP VTE LOW RISK PATIENT  Once      08/09/20 0642                Cheng Jose MD  Neurology  [facility]

## 2020-08-10 NOTE — HOSPITAL COURSE
Kwasi is a 69 yo female placed in observation for fall and generalized weakness now new onset atrial fib. She lives with her god child and baseline use a walker.No hypoglycemia.  On admit patient EKG showed normal sinus rhythm 64 no evidence of ischemia. On arrival unit about 630 am was noted to be in atrial fib 100-120's. I do not see previous EKG with atrial fib and patient not sure but was told irregular heart beat. Last 2 D echo on 6/29/20 showed EF 60%, grade II DD, mild mod left enlargement. Currently no neuro deficit on exam in bed and good strength in all extremities. Neurology and Cardiology consult. On 8/10, patient converted back to NSR. Cardiology recommended ASA as patient is a poor candidate for long term anticoagulation due to falls. SLP evaluation of dysphagia. MBSS negative for aspiration and penetration. SLP felt patient does have oropharyngeal dysphagia characterized by delayed swallow initiation and decreased base of tongue retraction resulting in trace to mod valleculae across trials. Continue mechanical soft and thin liquids with movement of head turn with all PO intake. Blood sugars improved with insulin adjustment. LFT's mild elevation and hep C ab positive. Hep C RNA pending on discharge. PT/OT recommended SNF. Patient stable for discharge to SNF  [place] and follow up PCP .

## 2020-08-10 NOTE — PT/OT/SLP PROGRESS
Occupational Therapy      Patient Name:  Lucina Villalpando   MRN:  [id number]    Patient not seen today secondary to elevated BP. Will follow-up later in P.M.    Therese Curiel OT  8/10/2020

## 2020-08-10 NOTE — SUBJECTIVE & OBJECTIVE
Interval History:     Review of Systems   Unable to perform ROS: dementia     Objective:     Vital Signs (Most Recent):  Temp: 99.1 °F (37.3 °C) (08/10/20 0747)  Pulse: 65 (08/10/20 0747)  Resp: 16 (08/10/20 0747)  BP: (!) 187/108 (08/10/20 0747)  SpO2: 99 % (08/10/20 0747) Vital Signs (24h Range):  Temp:  [98 °F (36.7 °C)-99.1 °F (37.3 °C)] 99.1 °F (37.3 °C)  Pulse:  [59-98] 65  Resp:  [16-20] 16  SpO2:  [98 %-100 %] 99 %  BP: (151-187)/() 187/108     Weight: 60 kg (132 lb 4.4 oz)  Body mass index is 20.11 kg/m².     SpO2: 99 %  O2 Device (Oxygen Therapy): room air    No intake or output data in the 24 hours ending 08/10/20 0856    Lines/Drains/Airways     Peripheral Intravenous Line                 Peripheral IV - Single Lumen 08/09/20 0108 24 G Left Hand 1 day         Peripheral IV - Single Lumen 08/10/20 0608 22 G Anterior;Left Forearm less than 1 day                Physical Exam   Constitutional: She is oriented to person, place, and time. She appears well-developed and well-nourished.   HENT:   Head: Normocephalic and atraumatic.   Eyes: Pupils are equal, round, and reactive to light. Conjunctivae are normal.   Neck: Normal range of motion. Neck supple.   Cardiovascular: Normal rate, normal heart sounds and intact distal pulses.   Pulmonary/Chest: Effort normal and breath sounds normal.   Abdominal: Soft. Bowel sounds are normal.   Musculoskeletal: Normal range of motion.   Neurological: She is alert and oriented to person, place, and time.   Skin: Skin is warm and dry.       Significant Labs: All pertinent lab results from the last 24 hours have been reviewed.    Significant Imaging: Echocardiogram: 2D echo with color flow doppler: No results found for this or any previous visit.

## 2020-08-10 NOTE — PROGRESS NOTES
PASRR completed     1046- LOCET called into [facility] completed with Adrien    1054- faxed PASRR to [facility] to obtain 142.    1104- TN attempted to contact pts daughter Yuliya to discuss d/c plan. No answer at this time TN requested call back name and contact information. TN to follow for call back    1106- orders,face sheet, and PASRR uploaded to [facility] and referral sent to the following facilities:    1. [facility]  2. [facility]  3. [facility]    TN to follow in [facility] for response.

## 2020-08-10 NOTE — DISCHARGE SUMMARY
"[facility] Medicine  Discharge Summary      Patient Name: Lucina Villalpando  MRN: [id number]  Admission Date: 8/9/2020  Hospital Length of Stay: 0 days  Discharge Date and Time:  08/10/2020 4:05 PM  Attending Physician: Nichelle Baumann MD   Discharging Provider: Francia Soriano NP  Primary Care Provider: Alexia Cervantes MD      HPI:   This is 70 y.o female with DM2, CKD, multiple CVAs, HTN, depression, leukopenia, RA who presents to the hospital with a chief complaint of bilateral weakness with associated difficulty walking for the past week, that has caused her to fall 4 times. Patient denies hitting her head or loss of consciousness. Patient reports associated symptoms include both feet swelling, nausea, abdominal pain with eating, and "food stuck at throat" sensation. She denies fever, chill, SOB,  vomiting, or diarrhea. She reports numbness in her bilateral fingertips. She reports recently she was able to walk around her house with using her walker.  She reports she takes 3 pills at home daily but can't tell me what they are. Per EMS her CBG was 435 and given 100 cc fluid with EMS. Of note, she was recently discharged for suspected stroke rule out on 6/30/2020 and her diabetes medications was discontinued due to hypoglycemia.     In ED, COVID negative. CXR suggest early interstitial edema/infiltrate.  CT head/CT pelvis/Hip xray unremarkable. CT lumbar spine with bilateral perinephric stranding left greater than right, correlation for UTI/pyelonephritis is needed. UA no infection. Labs with elevated ; AST//83. IVF 1L given in ED, hyperglycemia improved but patient continues to be unable to ambulate.     Patient is placed in Observation to further evaluation and treatment.       Procedure(s) (LRB):  Transesophageal echo (RADHA) intra-procedure log documentation (N/A)      Hospital Course:   Kwasi is a 71 yo female placed in observation for fall and generalized weakness " now new onset atrial fib. She lives with her god child and baseline use a walker.No hypoglycemia.  On admit patient EKG showed normal sinus rhythm 64 no evidence of ischemia. On arrival unit about 630 am was noted to be in atrial fib 100-120's. I do not see previous EKG with atrial fib and patient not sure but was told irregular heart beat. Last 2 D echo on 6/29/20 showed EF 60%, grade II DD, mild mod left enlargement. Currently no neuro deficit on exam in bed and good strength in all extremities. Neurology and Cardiology consult. On 8/10, patient converted back to NSR. Cardiology recommended ASA as patient is a poor candidate for long term anticoagulation due to falls. SLP evaluation of dysphagia. MBSS negative for aspiration and penetration. SLP felt patient does have oropharyngeal dysphagia characterized by delayed swallow initiation and decreased base of tongue retraction resulting in trace to mod valleculae across trials. Continue mechanical soft and thin liquids with movement of head turn with all PO intake. Blood sugars improved with insulin adjustment. LFT's mild elevation and hep C ab positive. Hep C RNA pending on discharge. PT/OT recommended SNF. Patient stable for discharge to SNF  [place] and follow up PCP .      Consults:   Consults (From admission, onward)        Status Ordering Provider     Inpatient consult to Cardiology  Once     Provider:  Joao Oliva MD    Acknowledged TRU RAMOS     Inpatient consult to Neurology  Once     Provider:  Cheng Jose MD    Completed JACKIE RAMOS     Inpatient consult to Social Work  Once     Provider:  (Not yet assigned)    Acknowledged ERIS CLEVELAND          No new Assessment & Plan notes have been filed under this hospital service since the last note was generated.  Service: Hospital Medicine    Final Active Diagnoses:    Diagnosis Date Noted POA    PRINCIPAL PROBLEM:  Weakness [R53.1] 08/09/2020 Unknown    Lupus [M32.9] 08/09/2020 Unknown     Hyperglycemia [R73.9] 08/09/2020 Unknown    Elevated brain natriuretic peptide (BNP) level [R79.89] 08/09/2020 Unknown    Dysphagia [R13.10] 08/09/2020 Unknown    Elevated transaminase level [R74.0] 08/09/2020 Unknown    Persistent atrial fibrillation [I48.19] 08/09/2020 Yes    Frequent falls [R29.6] 08/09/2020 Not Applicable    CKD (chronic kidney disease) [N18.9] 06/28/2020 Yes    Type 2 diabetes mellitus with neurologic complication [E11.49] 09/16/2015 Yes    H/O: CVA (cerebrovascular accident) [Z86.73] 09/16/2015 Not Applicable    Essential hypertension [I10] 09/16/2015 Yes      Problems Resolved During this Admission:       Discharged Condition: good    Disposition: Home or Self Care    Follow Up:  Follow-up Information     [facility].    Why: SNF  Contact information:  [facility] [address]  [phone]               Patient Instructions:      Diet diabetic     Diet Cardiac     Notify your health care provider if you experience any of the following:  temperature >100.4     Notify your health care provider if you experience any of the following:  difficulty breathing or increased cough     Notify your health care provider if you experience any of the following:  increased confusion or weakness     Activity as tolerated       Significant Diagnostic Studies: Labs: All labs within the past 24 hours have been reviewed    Pending Diagnostic Studies:     Procedure Component Value Units Date/Time    Hepatitis C RNA, quantitative, PCR [[id number]] Collected: 08/10/20 1154    Order Status: Sent Lab Status: In process Updated: 08/10/20 1234    Specimen: Blood     Phosphatidylethanol (PETH) [[id number]] Collected: 08/09/20 0715    Order Status: Sent Lab Status: In process Updated: 08/09/20 1338    Specimen: Blood          Medications:  Reconciled Home Medications:      Medication List      START taking these medications    amLODIPine 5 MG tablet  Commonly known as: NORVASC  Take  1 tablet (5 mg total) by mouth once daily.  Start taking on: August 11, 2020     insulin detemir U-100 100 unit/mL (3 mL) Inpn pen  Commonly known as: LEVEMIR FLEXTOUCH  Inject 10 Units into the skin every evening.     metoprolol tartrate 50 MG tablet  Commonly known as: LOPRESSOR  Take 1 tablet (50 mg total) by mouth 2 (two) times daily. Hold if SBP<110 or HR <55        CONTINUE taking these medications    aspirin 325 MG EC tablet  Commonly known as: ECOTRIN  Take 1 tablet (325 mg total) by mouth once daily.     atorvastatin 40 MG tablet  Commonly known as: LIPITOR  Take 1 tablet (40 mg total) by mouth every evening.     hydrOXYchloroQUINE 200 mg tablet  Commonly known as: PLAQUENIL  Take 1 tablet (200 mg total) by mouth once daily. Home medication     ondansetron 8 MG Tbdl  Commonly known as: ZOFRAN-ODT  Take 1 tablet (8 mg total) by mouth every 8 (eight) hours as needed (Nausea).     traMADoL 50 mg tablet  Commonly known as: ULTRAM  Take 1 tablet (50 mg total) by mouth every 6 (six) hours as needed for Pain.     VITAMIN B-12 1000 MCG tablet  Generic drug: cyanocobalamin  Take 100 mcg by mouth once daily.        STOP taking these medications    clopidogreL 75 mg tablet  Commonly known as: PLAVIX            Indwelling Lines/Drains at time of discharge:   Lines/Drains/Airways     None                 Time spent on the discharge of patient: 35 minutes  Patient was seen and examined on the date of discharge and determined to be suitable for discharge.         Francia Soriano NP  Department of Hospital Medicine  [facility]

## 2020-08-10 NOTE — PROGRESS NOTES
Call back received from pts daughter Yuliya, whom states she is currently in [place] for work and that she will not be back till later this evening around 10pm.  TN inquired if she was in agreement with pt going to SNF level of care at time of discharge.  Yuliya states that she is if the pt is and inquired if TN remembered that we tried in the past to get pt into SNF and once pt was accepted to [facility] and at very last minute pt changed her mind and stated that she wanted to return home with belkys Duncan and because the pt was in her right mind she could refuse and could go home if that what she decided.  The difference this time is that the pts belkys Duncan has reached out to Yuliya and told her that she feels that she is no longer able to care for the pt due to increase weakness and falls.  Perla feels that the pt would be safer in a facility.  Yuliya reports that she feels the same as well and it would not be safe for the pt to go to her home either and for sure not safe to go to her own home.  TN advised that referral for SNF placement has been sent to all facilities in network with the pts insurance for review and secure an accepting facility for discharge.  Yuliya in agreement with this but did ask if the pt would be able to stay at least until tomorrow since she is not in town and will not be till later today and she can talk to the pt to help get her to agree to go and actually go this time.  TN sent 2 additional facilities SNF packet for review via [facility] and [facility]. TN to follow in [facility] for response.    9877- message sent to KANDACE Feldman to advise of daughters request to d/c pt on tomorrow to SNF if possible so that she can make sure that the pt actually goes this time and does not back out.

## 2020-08-10 NOTE — PROGRESS NOTES
[facility]  Cardiology  Progress Note    Patient Name: Lucina Villalpando  MRN: [id number]  Admission Date: 8/9/2020  Hospital Length of Stay: 0 days  Code Status: Full Code   Attending Physician: Nichelle Baumann MD   Primary Care Physician: Alexia Cervantes MD  Expected Discharge Date:   Principal Problem:Weakness    Subjective:     Hospital Course:   8-10 back in NSR this AM. C/O hip pain. Denies CP or SOB    Interval History:     Review of Systems   Unable to perform ROS: dementia     Objective:     Vital Signs (Most Recent):  Temp: 99.1 °F (37.3 °C) (08/10/20 0747)  Pulse: 65 (08/10/20 0747)  Resp: 16 (08/10/20 0747)  BP: (!) 187/108 (08/10/20 0747)  SpO2: 99 % (08/10/20 0747) Vital Signs (24h Range):  Temp:  [98 °F (36.7 °C)-99.1 °F (37.3 °C)] 99.1 °F (37.3 °C)  Pulse:  [59-98] 65  Resp:  [16-20] 16  SpO2:  [98 %-100 %] 99 %  BP: (151-187)/() 187/108     Weight: 60 kg (132 lb 4.4 oz)  Body mass index is 20.11 kg/m².     SpO2: 99 %  O2 Device (Oxygen Therapy): room air    No intake or output data in the 24 hours ending 08/10/20 0856    Lines/Drains/Airways     Peripheral Intravenous Line                 Peripheral IV - Single Lumen 08/09/20 0108 24 G Left Hand 1 day         Peripheral IV - Single Lumen 08/10/20 0608 22 G Anterior;Left Forearm less than 1 day                Physical Exam   Constitutional: She is oriented to person, place, and time. She appears well-developed and well-nourished.   HENT:   Head: Normocephalic and atraumatic.   Eyes: Pupils are equal, round, and reactive to light. Conjunctivae are normal.   Neck: Normal range of motion. Neck supple.   Cardiovascular: Normal rate, normal heart sounds and intact distal pulses.   Pulmonary/Chest: Effort normal and breath sounds normal.   Abdominal: Soft. Bowel sounds are normal.   Musculoskeletal: Normal range of motion.   Neurological: She is alert and oriented to person, place, and time.   Skin: Skin is warm and dry.        Significant Labs: All pertinent lab results from the last 24 hours have been reviewed.    Significant Imaging: Echocardiogram: 2D echo with color flow doppler: No results found for this or any previous visit.    Assessment and Plan:     Brief HPI:     Persistent atrial fibrillation  New Dx. Mildly increased HR. Increase metoprolol. Full dose lovenox. RADHA/CV in AM if A-fib persists. Recent echo with good EF. Would place on DOAC at discharge for 1 month after CV. Poor candidate for long term OAC due to dementia and frequent falls    8-10 Back in NSR. Continue with ASA alone given high risk for OAC with falls. Will f/u prn    Elevated brain natriuretic peptide (BNP) level  Appears to have diastolic CHF - worsened by A-fib. Diuresis and afterload reduction as tolerated    Frequent falls  As above    Essential hypertension  Poorly controlled. Increase B-blocker    H/O: CVA (cerebrovascular accident)  Per primary    Type 2 diabetes mellitus with neurologic complication  Per primary        VTE Risk Mitigation (From admission, onward)         Ordered     enoxaparin injection 60 mg  Every 12 hours (non-standard times)      08/09/20 0835     Place YULI hose  Until discontinued      08/09/20 0642     Place sequential compression device  Until discontinued      08/09/20 0642     IP VTE LOW RISK PATIENT  Once      08/09/20 0642                Joao Oliva MD  Cardiology  [facility]

## 2020-08-10 NOTE — PLAN OF CARE
Problem: Fall Injury Risk  Goal: Absence of Fall and Fall-Related Injury  Outcome: Ongoing, Progressing     Problem: Adult Inpatient Plan of Care  Goal: Plan of Care Review  Outcome: Ongoing, Progressing  Goal: Patient-Specific Goal (Individualization)  Outcome: Ongoing, Progressing  Goal: Absence of Hospital-Acquired Illness or Injury  Outcome: Ongoing, Progressing  Goal: Optimal Comfort and Wellbeing  Outcome: Ongoing, Progressing  Goal: Readiness for Transition of Care  Outcome: Ongoing, Progressing  Goal: Rounds/Family Conference  Outcome: Ongoing, Progressing     Problem: Skin Injury Risk Increased  Goal: Skin Health and Integrity  Outcome: Ongoing, Progressing     Problem: Diabetes Comorbidity  Goal: Blood Glucose Level Within Desired Range  Outcome: Ongoing, Progressing     Problem: Adjustment to Illness (Sepsis/Septic Shock)  Goal: Optimal Coping  Outcome: Ongoing, Progressing     Problem: Bleeding (Sepsis/Septic Shock)  Goal: Absence of Bleeding  Outcome: Ongoing, Progressing     Problem: Glycemic Control Impaired (Sepsis/Septic Shock)  Goal: Blood Glucose Level Within Desired Range  Outcome: Ongoing, Progressing     Problem: Hemodynamic Instability (Sepsis/Septic Shock)  Goal: Effective Tissue Perfusion  Outcome: Ongoing, Progressing     Problem: Infection (Sepsis/Septic Shock)  Goal: Absence of Infection Signs/Symptoms  Outcome: Ongoing, Progressing     Problem: Nutrition Impaired (Sepsis/Septic Shock)  Goal: Optimal Nutrition Intake  Outcome: Ongoing, Progressing     Problem: Respiratory Compromise (Sepsis/Septic Shock)  Goal: Effective Oxygenation and Ventilation  Outcome: Ongoing, Progressing     Problem: Electrolyte Imbalance (Acute Kidney Injury/Impairment)  Goal: Serum Electrolyte Balance  Outcome: Ongoing, Progressing     Problem: Fluid Imbalance (Acute Kidney Injury/Impairment)  Goal: Optimal Fluid Balance  Outcome: Ongoing, Progressing     Problem: Hematologic Alteration (Acute Kidney  Injury/Impairment)  Goal: Hemoglobin, Hematocrit and Platelets Within Normal Range  Outcome: Ongoing, Progressing     Problem: Oral Intake Inadequate (Acute Kidney Injury/Impairment)  Goal: Optimal Nutrition Intake  Outcome: Ongoing, Progressing     Problem: Renal Function Impairment (Acute Kidney Injury/Impairment)  Goal: Effective Renal Function  Outcome: Ongoing, Progressing

## 2020-08-10 NOTE — PLAN OF CARE
[facility]     Department of Hospital Medicine     [address] [place] [address]     [phone] after hours  [phone] fax       NURSING HOME ORDERS    08/10/2020    Admit to Nursing Home:       Skilled Bed                                          Diagnoses:  Active Hospital Problems    Diagnosis  POA    *Weakness [R53.1]  Unknown    Lupus [M32.9]  Unknown    Hyperglycemia [R73.9]  Unknown    Elevated brain natriuretic peptide (BNP) level [R79.89]  Unknown    Dysphagia [R13.10]  Unknown    Elevated transaminase level [R74.0]  Unknown    Persistent atrial fibrillation [I48.19]  Yes    Frequent falls [R29.6]  Not Applicable    CKD (chronic kidney disease) [N18.9]  Yes    Type 2 diabetes mellitus with neurologic complication [E11.49]  Yes    H/O: CVA (cerebrovascular accident) [Z86.73]  Not Applicable    Essential hypertension [I10]  Yes      Resolved Hospital Problems   No resolved problems to display.       Patient is homebound due to:  Weakness    Allergies:Review of patient's allergies indicates:  No Known Allergies    Vitals:          Every shift (Skilled Nursing patients)    Diet:  Diabetic/Cardia Diet-Mechanical Soft    Acitivities:      - Up in a chair each morning as tolerated   - Ambulate with assistance to bathroom  air      LABS:  Per facility protocol    Nursing Precautions:     - Aspiration precautions:             - Total assistance with meals            -  Upright 90 degrees befor during and after meals             -  Suction at bedside          - Fall precautions per nursing home protocol   - Seizure precaution per long-term protocol   - Decubitus precautions:        -  for positioning   - Pressure reducing foam mattress   - Turn patient every two hours. Use wedge pillows to anchor patient    CONSULTS:      Physical Therapy to evaluate and treat     Occupational Therapy to evaluate and treat     Speech Therapy  to evaluate  and treat         MISCELLANEOUS CARE:      Routine Skin for Bedridden Patients:  Apply moisture barrier cream to all    skin folds and wet areas in perineal area daily and after baths and                           all bowel movements.                   DIABETES CARE:       Check blood sugar:         Fingerstick blood sugar AC and HS        Report CBG < 60 or > 400 to physician.                                          Insulin Sliding Scale          Glucose  Novolog Insulin Subcutaneous        0 - 60   Orange juice or glucose tablet, hold insulin      No insulin   201-250  2 units   251-300  4 units   301-350  6 units   351-400  8 units   >400   10 units then call physician      Medications: Discontinue all previous medication orders, if any. See new list below.     Lucina Villalpando   Home Medication Instructions HERMELINDO:[id number]    Printed on:08/10/20 1035   Medication Information                      amLODIPine (NORVASC) 5 MG tablet  Take 1 tablet (5 mg total) by mouth once daily.             aspirin (ECOTRIN) 325 MG EC tablet  Take 1 tablet (325 mg total) by mouth once daily.             atorvastatin (LIPITOR) 40 MG tablet  Take 1 tablet (40 mg total) by mouth every evening.             cyanocobalamin (VITAMIN B-12) 1000 MCG tablet  Take 100 mcg by mouth once daily.             hydroxychloroquine (PLAQUENIL) 200 mg tablet  Take 1 tablet (200 mg total) by mouth once daily. Home medication             insulin detemir U-100 (LEVEMIR FLEXTOUCH) 100 unit/mL (3 mL) SubQ InPn pen  Inject 10 Units into the skin every evening.             metoprolol tartrate (LOPRESSOR) 50 MG tablet  Take 1 tablet (50 mg total) by mouth 2 (two) times daily. Hold if SBP<110 or HR <55             ondansetron (ZOFRAN-ODT) 8 MG TbDL  Take 1 tablet (8 mg total) by mouth every 8 (eight) hours as needed (Nausea).             tramadol (ULTRAM) 50 mg tablet  Take 50 mg by mouth every 6 (six) hours as needed for Pain.                        _________________________________  Francia Soriano, KANDACE  08/10/2020

## 2020-08-10 NOTE — PT/OT/SLP PROGRESS
Physical Therapy      Patient Name:  Lucina Villalpando   MRN:  [id number]    Patient not seen today secondary to (pt not appropriate at this time due to elevated BP (199/91)).  Took BP again 5 minutes later and BP was 201/91, PCT present. Pt was given BP meds already this morning(BP at ~0747 187/108). Pt's nurse, Oumar notified.  Will follow-up as able.    Zaina Page, PTA

## 2020-08-10 NOTE — PROGRESS NOTES
Met with pt at bedside to discuss d/c plan for pt. TN advised pt that the recommendations are for pt to d/c to SNF level of care per PT/OT eval.  Pt at this time states that she does not wish to go to SNF at time of discharge and would prefer to return to her niece's home as prior to coming to the hospital.  TN to contact pts niece to confirm this information    1025- TN advised NP that the pt dies not wish to go to SNF at time of discharge. KANDACE Feldman to advise pts daughter of this information and that the pt will be discharging to home on today.    1035- message received from NP stating that the pt told her that she now wants to go to SNF level of care at time of discharge. TN to begin process

## 2020-08-10 NOTE — PROGRESS NOTES
Call received from Tova with [facility] stating that she can accept the pt on today that she will just need a hard script for the ultram for the pt. TN to request from NP and send when available.    1343- TN requested ultram script for pt from NP-- script scanned to email and uploaded to [facility].   TN requested pts nurse Jeyson to call when pt arrives back to floor and when transportation can be scheduled. TN pending call report information from [facility].

## 2020-08-10 NOTE — PLAN OF CARE
08/10/20 1527   Final Note   Assessment Type Final Discharge Note   Anticipated Discharge Disposition SNF   Hospital Follow Up  Appt(s) scheduled?   (n/a)   Discharge plans and expectations educations in teach back method with documentation complete? Yes   Right Care Referral Info   Post Acute Recommendation SNF / Sub-Acute Rehab   Referral Type SNF   Facility Name [facility]   Post-Acute Status   Post-Acute Authorization Placement   Post-Acute Placement Status Set-up Complete   ADT 30 order entered for requested  time of 4:30pm TN advised pts nurse Jeyson of this information.      ADT 30 order placed for [facility] Transportation.  Requested  time:4:30pm  If transportation does not arrive at ETA time nurse will be instructed to follow protocol for transportation below:    How can I get in touch directly with dispatch, if needed?                 Non-emergent dispatch: [phone]                                    Emergent dispatch: [phone]  Non-emergent (stretcher): [phone]  Escalation Needs (PFC Lead): [phone]

## 2020-08-10 NOTE — PROCEDURES
Modified Barium Swallow    Patient Name:  Lucina Villalpando   MRN:  [id number]      Recommendations:     Recommendations:                General Recommendations:  Dysphagia therapy  Diet recommendations:  Mechanical soft, Thin   Aspiration Precautions: Alternating bites/sips, Frequent oral care, HOB to 90 degrees, Meds whole 1 at a time and Monitor for s/s of aspiration   General Precautions: Standard    Communication strategies:  provide increased time to answer    Referral     Reason for Referral  Patient was referred for a Modified Barium Swallow Study to assess the efficiency of his/her swallow function, rule out aspiration and make recommendations regarding safe dietary consistencies, effective compensatory strategies, and safe eating environment.     Diagnosis: Weakness       History:     Past Medical History:   Diagnosis Date    Arthritis     Depression     Diabetes mellitus     Fibromyalgia     Hypertension     Stroke        Objective:     Current Respiratory Status:      Alert: yes    Cooperative: yes    Follows Directions: yes    Visualization  · Patient was seen in the lateral view    Oral Peripheral Examination  · Oral Musculature: general weakness  · Dentition: edentulous  · Mucosal Quality: good  · Oral Labial Strength and Mobility: impaired coordination  · Voice Prior to PO Intake: wfl  · Oral Musculature Comments: decreased rate of movement    Consistencies Assessed  · Thin via spoon, cup, straw (40mL)   · Nectar thick via cup (15mL)  · Puree x2 tsp  · Soft solids x1 tsp     Oral Preparation/Oral Phase  · prolonged A-P transfer - across trials   · prolonged mastication - across puree and soft trials  · Decreased base of tongue mobility - across trials  · Premature spillage - to the level of the valleculae consistent across trials    Pharyngeal Phase   Across trials, pt observed with significantly delayed swallow initiation. Improved movement of bolus noted with use of head turn maneuver (to left)  with TL, NTL and Puree trial trials. Facilitated use of CT as well, no improvement noted.     TL trials via spoon, straw, cup and NTL via cup - mild vallecular and pharyngeal wall stasis post swallow which pt cleared with an additional cued swallow. Flash penetration of the laryngeal vestibule observed x1 post swallow TL via cup     Across Puree x2 and Soft solid trials x2 - mild lingual residue and mild vallecular stasis post swallow which pt cleared with an additional cued swallow.     Thin liquid x1 - Rosenbeck Score 2 : Material enters the airway, remains above the vocal folds & is ejected from the airway.     Across all other trials - Rosenbeck Score 1 : Material does not enter airway.    Cervical Esophageal Phase  · UES appeared to accommodate all bolus types without stasis or retrograde movement observed     Assessment:     Impressions    Patient demonstrates moderate  Oropharyngeal  dysphagia characterized by c/b  delayed swallow initiation, and decreased BOT retraction resulting in trace to mod valleculae stasis across trials.     Prognosis: Fair    Barriers:  · Poor intake    Plan  Continue ST at next level of care    Education  Results were discussed with patient and pt's physician who were in agreement with plan. Pt educated for necessity of head turn use with all PO intake. ST provided education to pt regarding the rate at which both liquid/solid boluses flow with maneuver use resulting in considerable decrease in the amount of vallecular and pharyngeal stasis post swallow.     Goals:   Multidisciplinary Problems     SLP Goals        Problem: SLP Goal    Goal Priority Disciplines Outcome   SLP Goal     SLP                    Plan:   · Patient to be seen:  Therapy Frequency: 4 x/week   · Plan of Care expires:  08/21/20  · Plan of Care reviewed with:           Discharge recommendations:  other (see comments)(ST post d/c)   Barriers to Discharge:  None    Time Tracking:   SLP Treatment Date:     8/10/20  Speech Start Time:   1330  Speech Stop Time:      1400  Speech Total Time (min):   30 min    Lorena Snow CCC-SLP  08/10/2020

## 2020-08-10 NOTE — ASSESSMENT & PLAN NOTE
New Dx. Mildly increased HR. Increase metoprolol. Full dose lovenox. RADHA/CV in AM if A-fib persists. Recent echo with good EF. Would place on DOAC at discharge for 1 month after CV. Poor candidate for long term OAC due to dementia and frequent falls    8-10 Back in NSR. Continue with ASA alone given high risk for OAC with falls. Will f/u prn

## 2020-08-10 NOTE — PROGRESS NOTES
Call placed to [place] to speak to Tova in  Admissions-- received call report pt to go to room 203 and the nurse will be Haresh and number is [phone]

## 2020-08-10 NOTE — PROGRESS NOTES
Call placed to pts daughter Yuliya to notify her that the pt will be discharging to [facility] on today.  No answer at this time detailed message left to advise that the pt will be discharging on today to [facility] and to call [place] if she has any questions.

## 2020-08-10 NOTE — PT/OT/SLP PROGRESS
Physical Therapy      Patient Name:  Lucina Villalpando   MRN:  [id number]    Patient not seen today secondary to (pt off floor for swallow study). Will follow-up as able.    Zaina Page, MEG

## 2020-08-11 NOTE — NURSING
Pt discharged per MD order. IV dc'd, catheter tip intact. VS stable, afebrile, NAD, no nausea/vomiting. Pt educated on discharge instructions with pt verbally indicating understanding. Pt discharged to SNF. Off unit via stretcher (EMS).

## 2020-08-13 LAB
HCV RNA SERPL NAA+PROBE-LOG IU: <1.08 LOG (10) IU/ML
HCV RNA SERPL QL NAA+PROBE: NOT DETECTED IU/ML
HCV RNA SPEC NAA+PROBE-ACNC: <12 IU/ML

## 2020-08-17 LAB — PHOSPHATIDYLETHANOL (PETH): NEGATIVE NG/ML

## 2023-10-09 NOTE — PROGRESS NOTES
----- Message from Elena Vergara sent at 10/9/2023  9:31 AM CDT -----  Regarding: pharmacy change  Patient need tramadol resent to [facility] [id number]     [facility] Hospital Medicine  Progress Note    Patient Name: Lucina Villalpando  MRN: [id number]  Patient Class: IP- Inpatient   Admission Date: 10/28/2019  Length of Stay: 2 days  Attending Physician: Emily Page MD  Primary Care Provider: Johan Harrison MD        Subjective:     Principal Problem:Acute cerebral infarction        HPI:    Lucina Villalpando is a 69 y.o. female that (in part)  has a past medical history of Arthritis, Depression, Diabetes mellitus, Fibromyalgia, Hypertension, and Stroke.  has a past surgical history that includes Knee surgery and Back surgery. Presents to [facility] Emergency Department complaining of slurred speech associated with altered mental status.  Last Ativan normal at approximately 12:30 a.m. on 10/27.  From Saturday the patient daughter found her on the floor and she was unable to speak.  EMS was activated.  When ambulance arrived the patient was asymptomatic and she refused further evaluation in the emergency department.  However over rated the weekend the base daughter about she was still experiencing insert speech and was having what sounds like aspiration while eating.  She is cyst and she come in for further evaluation.  She was experiencing drooling as well.  Remainder of the history is otherwise limited due to the current condition of the patient as well as her level of cooperation at this time.    In the emergency department routine laboratory studies and head CT was performed.  There is questionable at evidence of an age-indeterminate infarction.  MRI was performed which did confirm an acute infarction of the right ruth ann-kyree.  Patient well outside the window for tPA.  Stroke protocol and management initiated.  Neurology consulted.    Hospital medicine has been asked to admit for further evaluation and treatment.     Overview/Hospital Course:    Lucina Villalpando is a 69 y.o. female that (in part)  has a past medical  history of Arthritis, Depression, Diabetes mellitus, Fibromyalgia, Hypertension, and Stroke.  has a past surgical history that includes Knee surgery and Back surgery. Presents to [facility] Department complaining of slurred speech associated with altered mental status.  Last Ativan normal at approximately 12:30 a.m. on 10/27.  From Saturday the patient daughter found her on the floor and she was unable to speak.  EMS was activated.  When ambulance arrived the patient was asymptomatic and she refused further evaluation in the emergency department.  However over rated the weekend the base daughter about she was still experiencing slurred  speech and was having what sounds like aspiration while eating.  She is cyst and she come in for further evaluation.  She was experiencing drooling as well.  Remainder of the history is otherwise limited due to the current condition of the patient as well as her level of cooperation at this time.  In the emergency department routine laboratory studies and head CT was performed.  There is questionable at evidence of an age-indeterminate infarction.  MRI was performed which did confirm an acute infarction of the right ruth ann-kyree.  Patient well outside the window for tPA.  Stroke protocol and management initiated.  Neurology consulted.PT,OT,ST is consulted.  On ASA,statin,  MRA brain and carotid doppler are unremarkable,  Started gradually on BP med's.        Interval History: patient is alert.    Review of Systems   Constitutional: Positive for activity change, appetite change and fatigue.   HENT: Negative for congestion.    Eyes: Negative for discharge and itching.   Respiratory: Negative for apnea and chest tightness.    Cardiovascular: Negative for chest pain and leg swelling.   Gastrointestinal: Negative for abdominal distention.   Endocrine: Negative for heat intolerance.   Neurological: Positive for speech difficulty and weakness.     Objective:      Vital Signs (Most Recent):  Temp: 97.5 °F (36.4 °C) (10/30/19 0745)  Pulse: 67 (10/30/19 0745)  Resp: 18 (10/30/19 0745)  BP: (!) 141/68 (10/30/19 0745)  SpO2: 100 % (10/30/19 0745) Vital Signs (24h Range):  Temp:  [97.4 °F (36.3 °C)-98.5 °F (36.9 °C)] 97.5 °F (36.4 °C)  Pulse:  [67-98] 67  Resp:  [17-19] 18  SpO2:  [99 %-100 %] 100 %  BP: (115-149)/(61-75) 141/68     Weight: 58.3 kg (128 lb 8.5 oz)  Body mass index is 17.93 kg/m².    Intake/Output Summary (Last 24 hours) at 10/30/2019 0937  Last data filed at 10/29/2019 1714  Gross per 24 hour   Intake 240 ml   Output --   Net 240 ml      Physical Exam   Constitutional: She is oriented to person, place, and time. No distress.   HENT:   Head: Atraumatic.   Eyes: EOM are normal.   Neck: Neck supple.   Cardiovascular: Normal rate and regular rhythm.   Pulmonary/Chest: Effort normal and breath sounds normal.   Abdominal: Soft.   Neurological: She is oriented to person, place, and time.   Skin: Skin is warm and dry.       Significant Labs:   BMP:   Recent Labs   Lab 10/30/19  0451   *      K 4.7   *   CO2 22*   BUN 26*   CREATININE 1.3   CALCIUM 9.9   MG 1.9     CBC:   Recent Labs   Lab 10/28/19  1841 10/30/19  0451   WBC 3.64* 3.02*   HGB 11.7* 11.2*   HCT 36.3* 34.2*    208     CMP:   Recent Labs   Lab 10/28/19  1841 10/30/19  0451    140   K 5.0 4.7    112*   CO2 20* 22*   * 127*   BUN 25* 26*   CREATININE 1.3 1.3   CALCIUM 10.5 9.9   PROT 9.6* 8.7*   ALBUMIN 4.0 3.6   BILITOT 0.4 0.3   ALKPHOS 94 90   AST 21 16   ALT 13 9*   ANIONGAP 9 6*   EGFRNONAA 42* 42*       Significant Imaging:reviewed.      Assessment/Plan:      * Acute cerebral infarction  In the emergency department routine laboratory studies and head CT was performed.  There is questionable at evidence of an age-indeterminate infarction.  MRI was performed which did confirm an acute infarction of the right ruth ann-kyree.  Patient well outside the window for  tPA.  Stroke protocol and management initiated.  Neurology consulted.PT,OT,ST is consulted.  On ASA,statin,  MRA brain and carotid doppler are unremarkable,        Tobacco abuse  Consulted over 5 minutes need quit smoking.      Essential hypertension  Was Oo permissive HTN at this time.  Started gradually on BP med's.      Type 2 diabetes mellitus with neurologic complication  On SSI.        VTE Risk Mitigation (From admission, onward)         Ordered     enoxaparin injection 40 mg  Daily      10/29/19 0839     IP VTE LOW RISK PATIENT  Once      10/29/19 0015     Place YULI hose  Until discontinued      10/29/19 0015     Place sequential compression device  Until discontinued      10/29/19 0015                      Emily Page MD  Department of Hospital Medicine   [facility]

## 2025-03-24 NOTE — PT/OT/SLP EVAL
"Phone call from patient \"not feeling well\" requesting appt today. Transferred to CTS to discuss further.   " "Speech Language Pathology Evaluation  Bedside Swallow    Patient Name:  Lucina Villalpando   MRN:  [id number]  Admitting Diagnosis: Weakness    Recommendations:                 General Recommendations:  Dysphagia therapy  MBSS for further assessment  Diet recommendations:  Mechanical soft, Thin   Aspiration Precautions: 1 bite/sip at a time   General Precautions: Standard,    Communication strategies:  none    History:     Past Medical History:   Diagnosis Date    Arthritis     Depression     Diabetes mellitus     Fibromyalgia     Hypertension     Stroke        Past Surgical History:   Procedure Laterality Date    BACK SURGERY      2 x for ruptured disc    KNEE SURGERY      right knee-meniscus     Modified Barium Swallow: n/a     Chest X-Rays: 8/9 Prominent interstitial lung markings.  Findings may suggest early interstitial edema or early interstitial infiltrates.    Prior diet: soft with thin, Pt reporting she frequently coughs and chokes however she was once told to turn her heard to the right during swallow and this successfully prevents aspiration s/s    Subjective   Pt reporting she grossly tolerates po hwoever frequently "gags" if she doesn't turn her head to the right during the swallow, she's not sure who told her to do this ST vs nursing after one hospital admit. She reports she has never participated in MBSS  Patient goals: continue po    Pain/Comfort:  ·  0    Objective:     Oral Musculature Evaluation  · Oral Musculature: general weakness  · Dentition: edentulous  · Mucosal Quality: good  · Oral Labial Strength and Mobility: impaired coordination  · Voice Prior to PO Intake: wfl  · Oral Musculature Comments: decreased rate of movement    Bedside Swallow Eval:   Consistencies Assessed:  · Thin liquids 3oz via cup with head in neutral position  · Soft solids X1     Oral Phase:   · significantly delayed swallow  · prolonged mastication of soft solids     Pharyngeal Phase:   · decreased hyolaryngeal " excursion to palpation  · delayed swallow initation  · globus sensation   · Audible swallow    Compensatory Strategies  · Continue head turn to right per Pt's preference.     Treatment: ongoing assessment of swallow to ensure diet tolerance and that swallow compensation strategy is appropriate.     Assessment:     Lucina Villalpando is a 70 y.o. female with dx of weakness she presents with mild-moderate oropharyngeal dysphagia c/b by significant swallow delay.     Goals:   Multidisciplinary Problems     SLP Goals        Problem: SLP Goal    Goal Priority Disciplines Outcome   SLP Goal     SLP                    Plan:     · Patient to be seen:  4 x/week   · Plan of Care expires:  08/21/20  · Plan of Care reviewed with:      · SLP Follow-Up:  Yes       Discharge recommendations:  ST post d/c  Barriers to Discharge:  Level of Skilled Assistance Needed .    Time Tracking:     SLP Treatment Date:   08/09/20  Speech Start Time:  1320  Speech Stop Time:  1330     Speech Total Time (min):  10 min    Billable Minutes: Eval Swallow and Oral Function 10    Nandini Cartagena CCC-SLP  08/09/2020